# Patient Record
Sex: FEMALE | Race: WHITE | NOT HISPANIC OR LATINO | Employment: OTHER | URBAN - METROPOLITAN AREA
[De-identification: names, ages, dates, MRNs, and addresses within clinical notes are randomized per-mention and may not be internally consistent; named-entity substitution may affect disease eponyms.]

---

## 2017-12-11 ENCOUNTER — LAB (OUTPATIENT)
Dept: LAB | Facility: HOSPITAL | Age: 82
End: 2017-12-11

## 2017-12-11 ENCOUNTER — OFFICE VISIT (OUTPATIENT)
Dept: CARDIOLOGY | Facility: CLINIC | Age: 82
End: 2017-12-11

## 2017-12-11 VITALS — HEIGHT: 62 IN | WEIGHT: 106 LBS | BODY MASS INDEX: 19.51 KG/M2

## 2017-12-11 DIAGNOSIS — I48.0 PAF (PAROXYSMAL ATRIAL FIBRILLATION) (HCC): ICD-10-CM

## 2017-12-11 DIAGNOSIS — I48.0 PAF (PAROXYSMAL ATRIAL FIBRILLATION) (HCC): Primary | ICD-10-CM

## 2017-12-11 LAB
ANION GAP SERPL CALCULATED.3IONS-SCNC: 5 MMOL/L (ref 3–11)
BUN BLD-MCNC: 19 MG/DL (ref 9–23)
BUN/CREAT SERPL: 15.8 (ref 7–25)
CALCIUM SPEC-SCNC: 8.5 MG/DL (ref 8.7–10.4)
CHLORIDE SERPL-SCNC: 104 MMOL/L (ref 99–109)
CO2 SERPL-SCNC: 31 MMOL/L (ref 20–31)
CREAT BLD-MCNC: 1.2 MG/DL (ref 0.6–1.3)
DEPRECATED RDW RBC AUTO: 48.6 FL (ref 37–54)
ERYTHROCYTE [DISTWIDTH] IN BLOOD BY AUTOMATED COUNT: 13.6 % (ref 11.3–14.5)
GFR SERPL CREATININE-BSD FRML MDRD: 42 ML/MIN/1.73
GLUCOSE BLD-MCNC: 109 MG/DL (ref 70–100)
HCT VFR BLD AUTO: 40 % (ref 34.5–44)
HGB BLD-MCNC: 12.7 G/DL (ref 11.5–15.5)
MCH RBC QN AUTO: 30.8 PG (ref 27–31)
MCHC RBC AUTO-ENTMCNC: 31.8 G/DL (ref 32–36)
MCV RBC AUTO: 97.1 FL (ref 80–99)
PLATELET # BLD AUTO: 265 10*3/MM3 (ref 150–450)
PMV BLD AUTO: 10.7 FL (ref 6–12)
POTASSIUM BLD-SCNC: 4.1 MMOL/L (ref 3.5–5.5)
RBC # BLD AUTO: 4.12 10*6/MM3 (ref 3.89–5.14)
SODIUM BLD-SCNC: 140 MMOL/L (ref 132–146)
T4 FREE SERPL-MCNC: 0.91 NG/DL (ref 0.89–1.76)
TSH SERPL DL<=0.05 MIU/L-ACNC: 31.17 MIU/ML (ref 0.35–5.35)
WBC NRBC COR # BLD: 7.96 10*3/MM3 (ref 3.5–10.8)

## 2017-12-11 PROCEDURE — 85027 COMPLETE CBC AUTOMATED: CPT

## 2017-12-11 PROCEDURE — 84439 ASSAY OF FREE THYROXINE: CPT

## 2017-12-11 PROCEDURE — 84443 ASSAY THYROID STIM HORMONE: CPT

## 2017-12-11 PROCEDURE — 80048 BASIC METABOLIC PNL TOTAL CA: CPT

## 2017-12-11 PROCEDURE — 99213 OFFICE O/P EST LOW 20 MIN: CPT | Performed by: INTERNAL MEDICINE

## 2017-12-11 PROCEDURE — 36415 COLL VENOUS BLD VENIPUNCTURE: CPT

## 2017-12-11 PROCEDURE — 93000 ELECTROCARDIOGRAM COMPLETE: CPT | Performed by: INTERNAL MEDICINE

## 2017-12-11 RX ORDER — LEVOTHYROXINE SODIUM 0.07 MG/1
75 TABLET ORAL DAILY
Qty: 30 TABLET | Refills: 0 | Status: SHIPPED | OUTPATIENT
Start: 2017-12-11

## 2017-12-11 RX ORDER — DILTIAZEM HYDROCHLORIDE 120 MG/1
120 CAPSULE, EXTENDED RELEASE ORAL DAILY
Qty: 30 CAPSULE | Refills: 11 | Status: SHIPPED | OUTPATIENT
Start: 2017-12-11

## 2017-12-11 NOTE — PROGRESS NOTES
[facility] at [facility]  Office visit  Eliane Zamora  1929  [phone]    VISIT DATE:  2017    PCP: Jack Chilel MD  [address] [address]    CC:  Chief Complaint   Patient presents with   • Atrial Fibrillation       PROBLEM LIST:  1. Paroxysmal atrial fibrillation:  a. Rhythm control with sotalol.  b. Excessive bradyarrhythmias associated with digoxin.  2. Hypothyroidism.  3. History of abnormal stress echocardiogram with recent echocardiogram showing borderline LV function.   4. Osteoporosis/mild degenerative joint disease.  5. Hypercholesterolemia.  6. Hypothyroidism on chronic supplementation.   7. Gastroesophageal reflux disease.  8. Current urinary tract infection by laboratory value, 2013: Less than 100,000 units/mL of E. coli.  9. Surgical history:  a.  section.  b. Benign breast biopsy.    ASSESSMENT:   Diagnosis Plan   1. PAF (paroxysmal atrial fibrillation)         PLAN:  Paroxysmal atrial fibrillation: Currently with asymptomatic RVR today.  Daughter will make sure that she is taking her sotalol, 40 mg by mouth twice a day.  Adding diltiazem extended release 120 mg by mouth daily.  Will follow-up in one week to assess overall rate control.  Given overall frailty and high risk for anticoagulation I would like to avoid a cardioversion.  We'll attempt rate control initially.  Still feel that she is high risk for chronic anticoagulation.  CBC, BMP and thyroid function test pending today.  Daughter does not think she is taking her thyroid medications either.    Subjective  Family reports generalized frailty and fatigue.  Loss of appetite over the previous 6 months.  She reports stable shortness of breath.  She is very frail individual who essentially needs a wheelchair for ambulation.  She was noted to be in A. fib with RVR today.  Unclear duration.  Her daughter is unclear if she is been consistently taking her medications.  There is  "some issues with dementia in both the  and wife.  Previously considered a high risk candidate for chronic anticoagulation, I concur.  PVC did not tolerate digoxin.    PHYSICAL EXAMINATION:  Vitals:    12/11/17 1031   Weight: 48.1 kg (106 lb)   Height: 157.5 cm (62\")     General Appearance:    Frail    Head:    Normocephalic, without obvious abnormality, atraumatic   Eyes:    conjunctiva/corneas clear   Nose:   Nares normal, septum midline, mucosa normal, no drainage   Throat:   Lips, teeth and gums normal   Neck:   Supple, symmetrical, trachea midline, no carotid    bruit or JVD   Lungs:     Clear to auscultation bilaterally, respirations unlabored   Chest Wall:    No tenderness or deformity    Heart:    Irregularly irregular, tachycardia, S1 and S2 normal, no murmur, rub   or gallop, normal carotid impulse bilaterally without bruit.   Abdomen:     Soft, non-tender   Extremities:   Extremities normal, atraumatic, no cyanosis or edema   Pulses:   2+ and symmetric all extremities   Skin:   Skin color, texture, turgor normal, no rashes or lesions       Diagnostic Data:    ECG 12 Lead  Date/Time: 12/11/2017 10:44 AM  Performed by: LIZETT CHESTER III  Authorized by: LIZETT CHESTER III   Rhythm: atrial fibrillation  Rate: tachycardic  Clinical impression: abnormal ECG          No results found for: CHLPL, TRIG, HDL, LDLDIRECT  Lab Results   Component Value Date    GLUCOSE 104 (H) 03/29/2015    BUN 13 03/29/2015    CREATININE 0.9 03/29/2015     03/29/2015    K 4.3 03/29/2015    CL 99 03/29/2015    CO2 31 03/29/2015     No results found for: HGBA1C  Lab Results   Component Value Date    WBC 7.79 03/29/2015    HGB 13.4 03/29/2015    HCT 40.3 03/29/2015     03/29/2015       Allergies  Allergies   Allergen Reactions   • Codeine      Severe nausea and vomiting       Current Medications    Current Outpatient Prescriptions:   •  levothyroxine (SYNTHROID, LEVOTHROID) 75 MCG tablet, Take 1 tablet by mouth Daily., " Disp: 90 tablet, Rfl: 3  •  Multiple Vitamins-Minerals (CENTRUM SILVER ADULT 50+ PO), Take  by mouth Daily., Disp: , Rfl:   •  pantoprazole (PROTONIX) 40 MG EC tablet, Take 1 tablet by mouth Daily., Disp: 90 tablet, Rfl: 3  •  sotalol (BETAPACE) 80 MG tablet, Take 1/2 tab every 12 hours, Disp: 180 tablet, Rfl: 3          ROS  Review of Systems   Constitution: Positive for weakness and malaise/fatigue.   Cardiovascular: Positive for chest pain.   Respiratory: Positive for shortness of breath.        SOCIAL HX  Social History     Social History   • Marital status:      Spouse name: N/A   • Number of children: N/A   • Years of education: N/A     Occupational History   • Not on file.     Social History Main Topics   • Smoking status: Never Smoker   • Smokeless tobacco: Never Used   • Alcohol use 0.6 oz/week     1 Glasses of wine per week      Comment: occasionally   • Drug use: No   • Sexual activity: Defer     Other Topics Concern   • Not on file     Social History Narrative       FAMILY HX  No family history on file.          Thompson Navarrete III, MD, FACC

## 2017-12-18 ENCOUNTER — OFFICE VISIT (OUTPATIENT)
Dept: CARDIOLOGY | Facility: CLINIC | Age: 82
End: 2017-12-18

## 2017-12-18 VITALS
DIASTOLIC BLOOD PRESSURE: 48 MMHG | HEART RATE: 56 BPM | BODY MASS INDEX: 19.06 KG/M2 | HEIGHT: 62 IN | WEIGHT: 103.6 LBS | SYSTOLIC BLOOD PRESSURE: 98 MMHG

## 2017-12-18 DIAGNOSIS — I48.0 PAF (PAROXYSMAL ATRIAL FIBRILLATION) (HCC): Primary | ICD-10-CM

## 2017-12-18 DIAGNOSIS — E78.00 HYPERCHOLESTEROLEMIA: ICD-10-CM

## 2017-12-18 PROCEDURE — 99213 OFFICE O/P EST LOW 20 MIN: CPT | Performed by: INTERNAL MEDICINE

## 2017-12-18 RX ORDER — AMIODARONE HYDROCHLORIDE 200 MG/1
200 TABLET ORAL DAILY
Qty: 90 TABLET | Refills: 1 | Status: SHIPPED | OUTPATIENT
Start: 2017-12-18 | End: 2018-08-18

## 2017-12-18 NOTE — PROGRESS NOTES
[facility] at [facility]  Office visit  Eliane Zamora  1929  [phone]    VISIT DATE:  2017    PCP: aJck Chilel MD  [address] [address]    CC:  Chief Complaint   Patient presents with   • Atrial Fibrillation       PROBLEM LIST:  1. Paroxysmal atrial fibrillation:  a. Rhythm control with sotalol.  b. Excessive bradyarrhythmias associated with digoxin.  2. Hypothyroidism.  3. History of abnormal stress echocardiogram with recent echocardiogram showing borderline LV function.   4. Osteoporosis/mild degenerative joint disease.  5. Hypercholesterolemia.  6. Hypothyroidism on chronic supplementation.   7. Gastroesophageal reflux disease.  8. Current urinary tract infection by laboratory value, 2013: Less than 100,000 units/mL of E. coli.  9. Surgical history:  a.  section.  b. Benign breast biopsy.    ASSESSMENT:   Diagnosis Plan   1. PAF (paroxysmal atrial fibrillation)     2. Hypercholesterolemia         PLAN:  Paroxysmal atrial fibrillation: Currently asymptomatic, Improved heart rate.  Still having difficulty with medication compliance.  Arranging for home health nursing visits.  They're planning on spending 3 months in [place] starting on .  WIll be challenging for them to be compliant with her medical therapy during this time.  They both appear to have moderate dementia.  Ideally we would arrange for home health nursing visits to assess medication compliance and for indication education in [place] as well.  Discontinuing sotalol, transitioning over to amiodarone 200 mg by mouth daily.  Continue diltiazem extended release 120 mg by mouth daily.  Given overall frailty and high risk for anticoagulation I would avoid a cardioversion.  Continue rate control initially.  Still feel that she is high risk for chronic anticoagulation.  Laboratory evaluation revealed hypothyroidism, again this is a medication noncompliance issue due to  "underlying dementia.      Subjective  Daughter reports that there still having some difficulty consists of a taking her medications, she was able to get, pulse ox and a clock that had the day of the week on it.  Her parents are resistant to having help in the home.  They stopped strong desire to go to [place] this winter which they've been doing for the past 30 years.  lEiane currently denies chest pain, palpitations or lightheadedness.  Will switch her over to amiodarone once daily that way if she misses a dose or 2 her heart rate will still be reasonably well controlled given its long half-life.  Otherwise continue diltiazem.    PHYSICAL EXAMINATION:  Vitals:    12/18/17 0851   BP: 98/48   BP Location: Right arm   Patient Position: Sitting   Pulse: 56   Weight: 47 kg (103 lb 9.6 oz)   Height: 157.5 cm (62\")     General Appearance:    Frail    Head:    Normocephalic, without obvious abnormality, atraumatic   Eyes:    conjunctiva/corneas clear   Nose:   Nares normal, septum midline, mucosa normal, no drainage   Throat:   Lips, teeth and gums normal   Neck:   Supple, symmetrical, trachea midline, no carotid    bruit or JVD   Lungs:     Clear to auscultation bilaterally, respirations unlabored   Chest Wall:    No tenderness or deformity    Heart:    Irregularly irregular,S1 and S2 normal, no murmur, rub   or gallop, normal carotid impulse bilaterally without bruit.   Abdomen:     Soft, non-tender   Extremities:   Extremities normal, atraumatic, no cyanosis or edema   Pulses:   2+ and symmetric all extremities   Skin:   Skin color, texture, turgor normal, no rashes or lesions       Diagnostic Data:  Procedures  No results found for: CHLPL, TRIG, HDL, LDLDIRECT  Lab Results   Component Value Date    GLUCOSE 109 (H) 12/11/2017    BUN 19 12/11/2017    CREATININE 1.20 12/11/2017     12/11/2017    K 4.1 12/11/2017     12/11/2017    CO2 31.0 12/11/2017     No results found for: HGBA1C  Lab Results   Component " Value Date    WBC 7.96 12/11/2017    HGB 12.7 12/11/2017    HCT 40.0 12/11/2017     12/11/2017       Allergies  Allergies   Allergen Reactions   • Codeine      Severe nausea and vomiting       Current Medications    Current Outpatient Prescriptions:   •  diltiazem XR (DILACOR XR) 120 MG 24 hr capsule, Take 1 capsule by mouth Daily., Disp: 30 capsule, Rfl: 11  •  levothyroxine (SYNTHROID, LEVOTHROID) 75 MCG tablet, Take 1 tablet by mouth Daily., Disp: 30 tablet, Rfl: 0  •  Multiple Vitamins-Minerals (CENTRUM SILVER ADULT 50+ PO), Take  by mouth Daily., Disp: , Rfl:   •  pantoprazole (PROTONIX) 40 MG EC tablet, Take 1 tablet by mouth Daily., Disp: 90 tablet, Rfl: 3  •  sotalol (BETAPACE) 80 MG tablet, Take 1/2 tab every 12 hours, Disp: 180 tablet, Rfl: 3          ROS  Review of Systems   Constitution: Positive for weakness and malaise/fatigue.   Cardiovascular: Negative for chest pain, irregular heartbeat and palpitations.   Respiratory: Positive for shortness of breath.        SOCIAL HX  Social History     Social History   • Marital status:      Spouse name: N/A   • Number of children: N/A   • Years of education: N/A     Occupational History   • Not on file.     Social History Main Topics   • Smoking status: Never Smoker   • Smokeless tobacco: Never Used   • Alcohol use 0.6 oz/week     1 Glasses of wine per week      Comment: occasionally   • Drug use: No   • Sexual activity: Defer     Other Topics Concern   • Not on file     Social History Narrative       FAMILY HX  History reviewed. No pertinent family history.          Thompson Navarrete III, MD, FACC

## 2018-03-30 ENCOUNTER — APPOINTMENT (OUTPATIENT)
Dept: GENERAL RADIOLOGY | Facility: HOSPITAL | Age: 83
End: 2018-03-30

## 2018-03-30 ENCOUNTER — HOSPITAL ENCOUNTER (EMERGENCY)
Facility: HOSPITAL | Age: 83
Discharge: HOME | End: 2018-03-30
Attending: EMERGENCY MEDICINE | Admitting: EMERGENCY MEDICINE

## 2018-03-30 VITALS
RESPIRATION RATE: 16 BRPM | HEIGHT: 62 IN | SYSTOLIC BLOOD PRESSURE: 150 MMHG | DIASTOLIC BLOOD PRESSURE: 66 MMHG | OXYGEN SATURATION: 96 % | BODY MASS INDEX: 19.51 KG/M2 | TEMPERATURE: 98.4 F | WEIGHT: 106 LBS | HEART RATE: 73 BPM
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
## 2018-03-30 DIAGNOSIS — S42.032A CLOSED DISPLACED FRACTURE OF ACROMIAL END OF LEFT CLAVICLE, INITIAL ENCOUNTER: ICD-10-CM

## 2018-03-30 DIAGNOSIS — J18.9 PNEUMONIA OF LEFT LOWER LOBE DUE TO INFECTIOUS ORGANISM: ICD-10-CM

## 2018-03-30 LAB
ALBUMIN SERPL-MCNC: 3.6 G/DL (ref 3.2–4.8)
ALBUMIN/GLOB SERPL: 1.6 G/DL (ref 1.5–2.5)
ALP SERPL-CCNC: 109 U/L (ref 25–100)
ALT SERPL W P-5'-P-CCNC: 24 U/L (ref 7–40)
ANION GAP SERPL CALCULATED.3IONS-SCNC: 7 MMOL/L (ref 3–11)
AST SERPL-CCNC: 28 U/L (ref 0–33)
BASOPHILS # BLD AUTO: 0.02 10*3/MM3 (ref 0–0.2)
BASOPHILS NFR BLD AUTO: 0.2 % (ref 0–1)
BILIRUB SERPL-MCNC: 0.8 MG/DL (ref 0.3–1.2)
BILIRUB UR QL STRIP: NEGATIVE
BUN BLD-MCNC: 16 MG/DL (ref 9–23)
BUN/CREAT SERPL: 14.5 (ref 7–25)
CALCIUM SPEC-SCNC: 8.1 MG/DL (ref 8.7–10.4)
CHLORIDE SERPL-SCNC: 101 MMOL/L (ref 99–109)
CLARITY UR: CLEAR
CO2 SERPL-SCNC: 29 MMOL/L (ref 20–31)
COLOR UR: YELLOW
CREAT BLD-MCNC: 1.1 MG/DL (ref 0.6–1.3)
DEPRECATED RDW RBC AUTO: 48.9 FL (ref 37–54)
EOSINOPHIL # BLD AUTO: 0.05 10*3/MM3 (ref 0–0.3)
EOSINOPHIL NFR BLD AUTO: 0.6 % (ref 0–3)
ERYTHROCYTE [DISTWIDTH] IN BLOOD BY AUTOMATED COUNT: 14.3 % (ref 11.3–14.5)
GFR SERPL CREATININE-BSD FRML MDRD: 47 ML/MIN/1.73
GLOBULIN UR ELPH-MCNC: 2.3 GM/DL
GLUCOSE BLD-MCNC: 99 MG/DL (ref 70–100)
GLUCOSE UR STRIP-MCNC: NEGATIVE MG/DL
HCT VFR BLD AUTO: 39.4 % (ref 34.5–44)
HGB BLD-MCNC: 12.6 G/DL (ref 11.5–15.5)
HGB UR QL STRIP.AUTO: NEGATIVE
IMM GRANULOCYTES # BLD: 0.14 10*3/MM3 (ref 0–0.03)
IMM GRANULOCYTES NFR BLD: 1.7 % (ref 0–0.6)
KETONES UR QL STRIP: NEGATIVE
LEUKOCYTE ESTERASE UR QL STRIP.AUTO: NEGATIVE
LYMPHOCYTES # BLD AUTO: 0.72 10*3/MM3 (ref 0.6–4.8)
LYMPHOCYTES NFR BLD AUTO: 8.5 % (ref 24–44)
MCH RBC QN AUTO: 29.9 PG (ref 27–31)
MCHC RBC AUTO-ENTMCNC: 32 G/DL (ref 32–36)
MCV RBC AUTO: 93.4 FL (ref 80–99)
MONOCYTES # BLD AUTO: 0.73 10*3/MM3 (ref 0–1)
MONOCYTES NFR BLD AUTO: 8.7 % (ref 0–12)
NEUTROPHILS # BLD AUTO: 6.77 10*3/MM3 (ref 1.5–8.3)
NEUTROPHILS NFR BLD AUTO: 80.3 % (ref 41–71)
NITRITE UR QL STRIP: NEGATIVE
PH UR STRIP.AUTO: 6 [PH] (ref 5–8)
PLATELET # BLD AUTO: 242 10*3/MM3 (ref 150–450)
PMV BLD AUTO: 10.1 FL (ref 6–12)
POTASSIUM BLD-SCNC: 4 MMOL/L (ref 3.5–5.5)
PROT SERPL-MCNC: 5.9 G/DL (ref 5.7–8.2)
PROT UR QL STRIP: NEGATIVE
RBC # BLD AUTO: 4.22 10*6/MM3 (ref 3.89–5.14)
SODIUM BLD-SCNC: 137 MMOL/L (ref 132–146)
SP GR UR STRIP: 1.02 (ref 1–1.03)
TROPONIN I SERPL-MCNC: 0 NG/ML (ref 0–0.07)
UROBILINOGEN UR QL STRIP: NORMAL
WBC NRBC COR # BLD: 8.43 10*3/MM3 (ref 3.5–10.8)

## 2018-03-30 PROCEDURE — 73030 X-RAY EXAM OF SHOULDER: CPT

## 2018-03-30 PROCEDURE — 85025 COMPLETE CBC W/AUTO DIFF WBC: CPT | Performed by: EMERGENCY MEDICINE

## 2018-03-30 PROCEDURE — 25010000002 CEFTRIAXONE PER 250 MG: Performed by: EMERGENCY MEDICINE

## 2018-03-30 PROCEDURE — 80053 COMPREHEN METABOLIC PANEL: CPT | Performed by: EMERGENCY MEDICINE

## 2018-03-30 PROCEDURE — 96361 HYDRATE IV INFUSION ADD-ON: CPT

## 2018-03-30 PROCEDURE — 84484 ASSAY OF TROPONIN QUANT: CPT

## 2018-03-30 PROCEDURE — 99284 EMERGENCY DEPT VISIT MOD MDM: CPT

## 2018-03-30 PROCEDURE — 71045 X-RAY EXAM CHEST 1 VIEW: CPT

## 2018-03-30 PROCEDURE — 81003 URINALYSIS AUTO W/O SCOPE: CPT | Performed by: EMERGENCY MEDICINE

## 2018-03-30 PROCEDURE — 93005 ELECTROCARDIOGRAM TRACING: CPT | Performed by: EMERGENCY MEDICINE

## 2018-03-30 PROCEDURE — 96365 THER/PROPH/DIAG IV INF INIT: CPT

## 2018-03-30 RX ORDER — ACETAMINOPHEN 325 MG/1
650 TABLET ORAL ONCE
Status: COMPLETED | OUTPATIENT
Start: 2018-03-30 | End: 2018-03-30

## 2018-03-30 RX ORDER — CEFUROXIME AXETIL 500 MG/1
500 TABLET ORAL 2 TIMES DAILY
Qty: 20 TABLET | Refills: 0 | Status: SHIPPED | OUTPATIENT
Start: 2018-03-30 | End: 2018-06-07

## 2018-03-30 RX ORDER — CEFTRIAXONE SODIUM 1 G/50ML
1 INJECTION, SOLUTION INTRAVENOUS ONCE
Status: COMPLETED | OUTPATIENT
Start: 2018-03-30 | End: 2018-03-30

## 2018-03-30 RX ADMIN — SODIUM CHLORIDE 1000 ML: 9 INJECTION, SOLUTION INTRAVENOUS at 11:02

## 2018-03-30 RX ADMIN — ACETAMINOPHEN 650 MG: 325 TABLET ORAL at 11:12

## 2018-03-30 RX ADMIN — CEFTRIAXONE SODIUM 1 G: 1 INJECTION, SOLUTION INTRAVENOUS at 12:45

## 2018-04-25 ENCOUNTER — OFFICE VISIT (OUTPATIENT)
Dept: CARDIOLOGY | Facility: CLINIC | Age: 83
End: 2018-04-25

## 2018-04-25 VITALS
DIASTOLIC BLOOD PRESSURE: 50 MMHG | SYSTOLIC BLOOD PRESSURE: 122 MMHG | WEIGHT: 97.4 LBS | BODY MASS INDEX: 17.26 KG/M2 | HEIGHT: 63 IN | HEART RATE: 73 BPM

## 2018-04-25 DIAGNOSIS — I48.0 PAF (PAROXYSMAL ATRIAL FIBRILLATION) (HCC): Primary | ICD-10-CM

## 2018-04-25 PROCEDURE — 99213 OFFICE O/P EST LOW 20 MIN: CPT | Performed by: INTERNAL MEDICINE

## 2018-04-25 NOTE — PROGRESS NOTES
[facility] at [facility]  Office visit  Eliane Zamora  1929  [phone]    VISIT DATE:  2017    PCP: Jack Chilel MD  [address] [address]    CC:  No chief complaint on file.      PROBLEM LIST:  1. Paroxysmal atrial fibrillation:  a. Rhythm control with sotalol.  b. Excessive bradyarrhythmias associated with digoxin.  2. Hypothyroidism.  3. History of abnormal stress echocardiogram with recent echocardiogram showing borderline LV function.   4. Osteoporosis/mild degenerative joint disease.  5. Hypercholesterolemia.  6. Hypothyroidism on chronic supplementation.   7. Gastroesophageal reflux disease.  8. Current urinary tract infection by laboratory value, 2013: Less than 100,000 units/mL of E. coli.  9. Surgical history:  a.  section.  b. Benign breast biopsy.    ASSESSMENT:   Diagnosis Plan   1. PAF (paroxysmal atrial fibrillation)         PLAN:  Paroxysmal atrial fibrillation: Currently asymptomatic.  Having difficulty with medication compliance.   Still feel that she is high risk for chronic anticoagulation.    Amiodarone 200 mg by mouth daily.  Previously failed sotalol.  Repeat chest x-ray and EKG in 6 months.  Primary care physician following thyroid function tests.    Subjective  Recently returned from a three-month trip the [place].  Daughter is arranging for temporary home health visits.  She denies chest pain, palpitations or dyspnea.  Recent mechanical fall.  Chest x-ray did notice some mild new interstitial changes in her left long potentially concerning for developing pneumonia.  Lung fields are clear today.  She denies cough.    PHYSICAL EXAMINATION:  There were no vitals filed for this visit.  General Appearance:    Frail    Head:    Normocephalic, without obvious abnormality, atraumatic   Eyes:    conjunctiva/corneas clear   Nose:   Nares normal, septum midline, mucosa normal, no drainage   Throat:   Lips, teeth and gums normal    Neck:   Supple, symmetrical, trachea midline, no carotid    bruit or JVD   Lungs:     Clear to auscultation bilaterally, respirations unlabored   Chest Wall:    No tenderness or deformity    Heart:   Regular rate and rhythm,S1 and S2 normal, no murmur, rub   or gallop, normal carotid impulse bilaterally without bruit.   Abdomen:     Soft, non-tender   Extremities:   Extremities normal, atraumatic, no cyanosis or edema   Pulses:   2+ and symmetric all extremities   Skin:   Skin color, texture, turgor normal, no rashes or lesions       Diagnostic Data:  Procedures  No results found for: CHLPL, TRIG, HDL, LDLDIRECT  Lab Results   Component Value Date    GLUCOSE 99 03/30/2018    BUN 16 03/30/2018    CREATININE 1.10 03/30/2018     03/30/2018    K 4.0 03/30/2018     03/30/2018    CO2 29.0 03/30/2018     No results found for: HGBA1C  Lab Results   Component Value Date    WBC 8.43 03/30/2018    HGB 12.6 03/30/2018    HCT 39.4 03/30/2018     03/30/2018       Allergies  Allergies   Allergen Reactions   • Codeine      Severe nausea and vomiting       Current Medications    Current Outpatient Prescriptions:   •  amiodarone (PACERONE) 200 MG tablet, Take 1 tablet by mouth Daily., Disp: 90 tablet, Rfl: 1  •  cefuroxime (CEFTIN) 500 MG tablet, Take 1 tablet by mouth 2 (Two) Times a Day., Disp: 20 tablet, Rfl: 0  •  diltiazem XR (DILACOR XR) 120 MG 24 hr capsule, Take 1 capsule by mouth Daily., Disp: 30 capsule, Rfl: 11  •  levothyroxine (SYNTHROID, LEVOTHROID) 75 MCG tablet, Take 1 tablet by mouth Daily., Disp: 30 tablet, Rfl: 0  •  Multiple Vitamins-Minerals (CENTRUM SILVER ADULT 50+ PO), Take  by mouth Daily., Disp: , Rfl:   •  pantoprazole (PROTONIX) 40 MG EC tablet, Take 1 tablet by mouth Daily., Disp: 90 tablet, Rfl: 3          ROS  Review of Systems   Constitution: Positive for weakness and malaise/fatigue.   Cardiovascular: Negative for chest pain, irregular heartbeat and palpitations.   Respiratory:  Positive for shortness of breath.        SOCIAL HX  Social History     Social History   • Marital status:      Spouse name: N/A   • Number of children: N/A   • Years of education: N/A     Occupational History   • Not on file.     Social History Main Topics   • Smoking status: Never Smoker   • Smokeless tobacco: Never Used   • Alcohol use 0.6 oz/week     1 Glasses of wine per week      Comment: occasionally   • Drug use: No   • Sexual activity: Defer     Other Topics Concern   • Not on file     Social History Narrative   • No narrative on file       FAMILY HX  No family history on file.          Thompson Navarrete III, MD, FACC

## 2018-06-07 ENCOUNTER — APPOINTMENT (OUTPATIENT)
Dept: CARDIOLOGY | Facility: HOSPITAL | Age: 83
End: 2018-06-07
Attending: FAMILY MEDICINE

## 2018-06-07 ENCOUNTER — APPOINTMENT (OUTPATIENT)
Dept: CT IMAGING | Facility: HOSPITAL | Age: 83
End: 2018-06-07

## 2018-06-07 ENCOUNTER — APPOINTMENT (OUTPATIENT)
Dept: GENERAL RADIOLOGY | Facility: HOSPITAL | Age: 83
End: 2018-06-07

## 2018-06-07 ENCOUNTER — HOSPITAL ENCOUNTER (INPATIENT)
Facility: HOSPITAL | Age: 83
LOS: 6 days | Discharge: NURSING, REHAB OR LONG-TERM CARE | End: 2018-06-13
Attending: EMERGENCY MEDICINE | Admitting: INTERNAL MEDICINE

## 2018-06-07 DIAGNOSIS — R31.9 URINARY TRACT INFECTION WITH HEMATURIA, SITE UNSPECIFIED: ICD-10-CM

## 2018-06-07 DIAGNOSIS — Z74.09 IMPAIRED MOBILITY AND ADLS: ICD-10-CM

## 2018-06-07 DIAGNOSIS — J18.9 PNEUMONIA OF RIGHT LUNG DUE TO INFECTIOUS ORGANISM, UNSPECIFIED PART OF LUNG: ICD-10-CM

## 2018-06-07 DIAGNOSIS — A41.9 SEPSIS, DUE TO UNSPECIFIED ORGANISM: Primary | ICD-10-CM

## 2018-06-07 DIAGNOSIS — N39.0 URINARY TRACT INFECTION WITH HEMATURIA, SITE UNSPECIFIED: ICD-10-CM

## 2018-06-07 DIAGNOSIS — Z78.9 IMPAIRED MOBILITY AND ADLS: ICD-10-CM

## 2018-06-07 DIAGNOSIS — Z74.09 IMPAIRED FUNCTIONAL MOBILITY, BALANCE, GAIT, AND ENDURANCE: ICD-10-CM

## 2018-06-07 DIAGNOSIS — E86.0 DEHYDRATION, MODERATE: ICD-10-CM

## 2018-06-07 LAB
ALBUMIN SERPL-MCNC: 3.65 G/DL (ref 3.2–4.8)
ALBUMIN/GLOB SERPL: 1.4 G/DL (ref 1.5–2.5)
ALP SERPL-CCNC: 126 U/L (ref 25–100)
ALT SERPL W P-5'-P-CCNC: 23 U/L (ref 7–40)
ANION GAP SERPL CALCULATED.3IONS-SCNC: 7 MMOL/L (ref 3–11)
AST SERPL-CCNC: 29 U/L (ref 0–33)
BACTERIA UR QL AUTO: ABNORMAL /HPF
BASOPHILS # BLD AUTO: 0.01 10*3/MM3 (ref 0–0.2)
BASOPHILS NFR BLD AUTO: 0.1 % (ref 0–1)
BILIRUB SERPL-MCNC: 0.8 MG/DL (ref 0.3–1.2)
BILIRUB UR QL STRIP: ABNORMAL
BUN BLD-MCNC: 22 MG/DL (ref 9–23)
BUN/CREAT SERPL: 17.9 (ref 7–25)
CALCIUM SPEC-SCNC: 8.1 MG/DL (ref 8.7–10.4)
CHLORIDE SERPL-SCNC: 102 MMOL/L (ref 99–109)
CLARITY UR: ABNORMAL
CO2 SERPL-SCNC: 28 MMOL/L (ref 20–31)
COLOR UR: YELLOW
CREAT BLD-MCNC: 1.23 MG/DL (ref 0.6–1.3)
D-LACTATE SERPL-SCNC: 0.9 MMOL/L (ref 0.5–2)
DEPRECATED RDW RBC AUTO: 47.9 FL (ref 37–54)
EOSINOPHIL # BLD AUTO: 0.01 10*3/MM3 (ref 0–0.3)
EOSINOPHIL NFR BLD AUTO: 0.1 % (ref 0–3)
ERYTHROCYTE [DISTWIDTH] IN BLOOD BY AUTOMATED COUNT: 14.1 % (ref 11.3–14.5)
GFR SERPL CREATININE-BSD FRML MDRD: 41 ML/MIN/1.73
GLOBULIN UR ELPH-MCNC: 2.6 GM/DL
GLUCOSE BLD-MCNC: 106 MG/DL (ref 70–100)
GLUCOSE UR STRIP-MCNC: NEGATIVE MG/DL
HCT VFR BLD AUTO: 38.1 % (ref 34.5–44)
HGB BLD-MCNC: 12.2 G/DL (ref 11.5–15.5)
HGB UR QL STRIP.AUTO: ABNORMAL
HOLD SPECIMEN: NORMAL
HOLD SPECIMEN: NORMAL
HYALINE CASTS UR QL AUTO: ABNORMAL /LPF
IMM GRANULOCYTES # BLD: 0.03 10*3/MM3 (ref 0–0.03)
IMM GRANULOCYTES NFR BLD: 0.2 % (ref 0–0.6)
KETONES UR QL STRIP: NEGATIVE
LEUKOCYTE ESTERASE UR QL STRIP.AUTO: ABNORMAL
LYMPHOCYTES # BLD AUTO: 0.23 10*3/MM3 (ref 0.6–4.8)
LYMPHOCYTES NFR BLD AUTO: 1.9 % (ref 24–44)
MCH RBC QN AUTO: 29.6 PG (ref 27–31)
MCHC RBC AUTO-ENTMCNC: 32 G/DL (ref 32–36)
MCV RBC AUTO: 92.5 FL (ref 80–99)
MONOCYTES # BLD AUTO: 0.6 10*3/MM3 (ref 0–1)
MONOCYTES NFR BLD AUTO: 4.9 % (ref 0–12)
NEUTROPHILS # BLD AUTO: 11.49 10*3/MM3 (ref 1.5–8.3)
NEUTROPHILS NFR BLD AUTO: 93 % (ref 41–71)
NITRITE UR QL STRIP: NEGATIVE
PH UR STRIP.AUTO: 6 [PH] (ref 5–8)
PLATELET # BLD AUTO: 269 10*3/MM3 (ref 150–450)
PMV BLD AUTO: 9.4 FL (ref 6–12)
POTASSIUM BLD-SCNC: 3.7 MMOL/L (ref 3.5–5.5)
PROCALCITONIN SERPL-MCNC: 0.74 NG/ML
PROT SERPL-MCNC: 6.2 G/DL (ref 5.7–8.2)
PROT UR QL STRIP: ABNORMAL
RBC # BLD AUTO: 4.12 10*6/MM3 (ref 3.89–5.14)
RBC # UR: ABNORMAL /HPF
REF LAB TEST METHOD: ABNORMAL
SODIUM BLD-SCNC: 137 MMOL/L (ref 132–146)
SP GR UR STRIP: 1.03 (ref 1–1.03)
SQUAMOUS #/AREA URNS HPF: ABNORMAL /HPF
TRANS CELLS #/AREA URNS HPF: ABNORMAL /HPF
TROPONIN I SERPL-MCNC: 0.01 NG/ML (ref 0–0.07)
TROPONIN I SERPL-MCNC: 0.02 NG/ML
UROBILINOGEN UR QL STRIP: ABNORMAL
WBC NRBC COR # BLD: 12.34 10*3/MM3 (ref 3.5–10.8)
WBC UR QL AUTO: ABNORMAL /HPF
WHOLE BLOOD HOLD SPECIMEN: NORMAL
WHOLE BLOOD HOLD SPECIMEN: NORMAL

## 2018-06-07 PROCEDURE — 92610 EVALUATE SWALLOWING FUNCTION: CPT

## 2018-06-07 PROCEDURE — 87077 CULTURE AEROBIC IDENTIFY: CPT | Performed by: EMERGENCY MEDICINE

## 2018-06-07 PROCEDURE — 25010000002 ENOXAPARIN PER 10 MG: Performed by: FAMILY MEDICINE

## 2018-06-07 PROCEDURE — 25010000002 VANCOMYCIN PER 500 MG: Performed by: EMERGENCY MEDICINE

## 2018-06-07 PROCEDURE — 71250 CT THORAX DX C-: CPT

## 2018-06-07 PROCEDURE — 83605 ASSAY OF LACTIC ACID: CPT | Performed by: EMERGENCY MEDICINE

## 2018-06-07 PROCEDURE — 99285 EMERGENCY DEPT VISIT HI MDM: CPT

## 2018-06-07 PROCEDURE — 81001 URINALYSIS AUTO W/SCOPE: CPT | Performed by: EMERGENCY MEDICINE

## 2018-06-07 PROCEDURE — 93306 TTE W/DOPPLER COMPLETE: CPT | Performed by: INTERNAL MEDICINE

## 2018-06-07 PROCEDURE — 84484 ASSAY OF TROPONIN QUANT: CPT | Performed by: EMERGENCY MEDICINE

## 2018-06-07 PROCEDURE — 87040 BLOOD CULTURE FOR BACTERIA: CPT | Performed by: EMERGENCY MEDICINE

## 2018-06-07 PROCEDURE — 87086 URINE CULTURE/COLONY COUNT: CPT | Performed by: EMERGENCY MEDICINE

## 2018-06-07 PROCEDURE — 71045 X-RAY EXAM CHEST 1 VIEW: CPT

## 2018-06-07 PROCEDURE — 99223 1ST HOSP IP/OBS HIGH 75: CPT | Performed by: FAMILY MEDICINE

## 2018-06-07 PROCEDURE — 84484 ASSAY OF TROPONIN QUANT: CPT

## 2018-06-07 PROCEDURE — 25810000003 SODIUM CHLORIDE 0.9 % WITH KCL 20 MEQ 20-0.9 MEQ/L-% SOLUTION: Performed by: FAMILY MEDICINE

## 2018-06-07 PROCEDURE — 85025 COMPLETE CBC W/AUTO DIFF WBC: CPT | Performed by: EMERGENCY MEDICINE

## 2018-06-07 PROCEDURE — 25010000002 PIPERACILLIN SOD-TAZOBACTAM PER 1 G

## 2018-06-07 PROCEDURE — 25010000002 PIPERACILLIN SOD-TAZOBACTAM PER 1 G: Performed by: EMERGENCY MEDICINE

## 2018-06-07 PROCEDURE — 93005 ELECTROCARDIOGRAM TRACING: CPT | Performed by: EMERGENCY MEDICINE

## 2018-06-07 PROCEDURE — 80053 COMPREHEN METABOLIC PANEL: CPT | Performed by: EMERGENCY MEDICINE

## 2018-06-07 PROCEDURE — P9612 CATHETERIZE FOR URINE SPEC: HCPCS

## 2018-06-07 PROCEDURE — 87186 SC STD MICRODIL/AGAR DIL: CPT | Performed by: EMERGENCY MEDICINE

## 2018-06-07 PROCEDURE — 87899 AGENT NOS ASSAY W/OPTIC: CPT | Performed by: FAMILY MEDICINE

## 2018-06-07 PROCEDURE — 93306 TTE W/DOPPLER COMPLETE: CPT

## 2018-06-07 PROCEDURE — 84145 PROCALCITONIN (PCT): CPT | Performed by: EMERGENCY MEDICINE

## 2018-06-07 PROCEDURE — 70450 CT HEAD/BRAIN W/O DYE: CPT

## 2018-06-07 RX ORDER — ONDANSETRON 2 MG/ML
4 INJECTION INTRAMUSCULAR; INTRAVENOUS EVERY 6 HOURS PRN
Status: DISCONTINUED | OUTPATIENT
Start: 2018-06-07 | End: 2018-06-13 | Stop reason: HOSPADM

## 2018-06-07 RX ORDER — SACCHAROMYCES BOULARDII 250 MG
250 CAPSULE ORAL 2 TIMES DAILY
Status: DISCONTINUED | OUTPATIENT
Start: 2018-06-07 | End: 2018-06-13 | Stop reason: HOSPADM

## 2018-06-07 RX ORDER — ACETAMINOPHEN 500 MG
1000 TABLET ORAL ONCE
Status: COMPLETED | OUTPATIENT
Start: 2018-06-07 | End: 2018-06-07

## 2018-06-07 RX ORDER — PANTOPRAZOLE SODIUM 40 MG/1
40 TABLET, DELAYED RELEASE ORAL
Status: DISCONTINUED | OUTPATIENT
Start: 2018-06-08 | End: 2018-06-13 | Stop reason: HOSPADM

## 2018-06-07 RX ORDER — VANCOMYCIN HYDROCHLORIDE 1 G/200ML
20 INJECTION, SOLUTION INTRAVENOUS ONCE
Status: COMPLETED | OUTPATIENT
Start: 2018-06-07 | End: 2018-06-07

## 2018-06-07 RX ORDER — SODIUM CHLORIDE AND POTASSIUM CHLORIDE 150; 900 MG/100ML; MG/100ML
100 INJECTION, SOLUTION INTRAVENOUS CONTINUOUS
Status: DISCONTINUED | OUTPATIENT
Start: 2018-06-07 | End: 2018-06-08

## 2018-06-07 RX ORDER — ACETAMINOPHEN 325 MG/1
650 TABLET ORAL EVERY 4 HOURS PRN
Status: DISCONTINUED | OUTPATIENT
Start: 2018-06-07 | End: 2018-06-13 | Stop reason: HOSPADM

## 2018-06-07 RX ORDER — CYPROHEPTADINE HYDROCHLORIDE 4 MG/1
4 TABLET ORAL 3 TIMES DAILY PRN
COMMUNITY
End: 2018-08-18

## 2018-06-07 RX ORDER — SODIUM CHLORIDE 0.9 % (FLUSH) 0.9 %
10 SYRINGE (ML) INJECTION AS NEEDED
Status: DISCONTINUED | OUTPATIENT
Start: 2018-06-07 | End: 2018-06-13 | Stop reason: HOSPADM

## 2018-06-07 RX ORDER — DOCUSATE SODIUM 100 MG/1
100 CAPSULE, LIQUID FILLED ORAL 2 TIMES DAILY PRN
Status: DISCONTINUED | OUTPATIENT
Start: 2018-06-07 | End: 2018-06-13 | Stop reason: HOSPADM

## 2018-06-07 RX ORDER — BISACODYL 5 MG/1
5 TABLET, DELAYED RELEASE ORAL DAILY PRN
Status: DISCONTINUED | OUTPATIENT
Start: 2018-06-07 | End: 2018-06-13 | Stop reason: HOSPADM

## 2018-06-07 RX ORDER — SODIUM CHLORIDE 0.9 % (FLUSH) 0.9 %
1-10 SYRINGE (ML) INJECTION AS NEEDED
Status: DISCONTINUED | OUTPATIENT
Start: 2018-06-07 | End: 2018-06-13 | Stop reason: HOSPADM

## 2018-06-07 RX ORDER — LEVOTHYROXINE SODIUM 0.07 MG/1
75 TABLET ORAL DAILY
Status: DISCONTINUED | OUTPATIENT
Start: 2018-06-08 | End: 2018-06-13 | Stop reason: HOSPADM

## 2018-06-07 RX ADMIN — SODIUM CHLORIDE 2000 ML: 9 INJECTION, SOLUTION INTRAVENOUS at 07:59

## 2018-06-07 RX ADMIN — ENOXAPARIN SODIUM 30 MG: 30 INJECTION SUBCUTANEOUS at 16:33

## 2018-06-07 RX ADMIN — VANCOMYCIN HYDROCHLORIDE 1000 MG: 1 INJECTION, SOLUTION INTRAVENOUS at 08:35

## 2018-06-07 RX ADMIN — Medication 250 MG: at 20:06

## 2018-06-07 RX ADMIN — TAZOBACTAM SODIUM AND PIPERACILLIN SODIUM 4.5 G: 500; 4 INJECTION, SOLUTION INTRAVENOUS at 08:36

## 2018-06-07 RX ADMIN — TAZOBACTAM SODIUM AND PIPERACILLIN SODIUM 3.38 G: 375; 3 INJECTION, SOLUTION INTRAVENOUS at 20:09

## 2018-06-07 RX ADMIN — POTASSIUM CHLORIDE AND SODIUM CHLORIDE 100 ML/HR: 900; 150 INJECTION, SOLUTION INTRAVENOUS at 16:33

## 2018-06-07 RX ADMIN — ACETAMINOPHEN 1000 MG: 500 TABLET, FILM COATED ORAL at 09:01

## 2018-06-07 NOTE — PLAN OF CARE
Problem: Patient Care Overview  Goal: Plan of Care Review  Outcome: Ongoing (interventions implemented as appropriate)   06/07/18 1531   Coping/Psychosocial   Plan of Care Reviewed With patient;spouse     SLP evaluation completed. Will follow-up to further assess swallow fxn & r/o silent aspiration w/ FEES. May start regular diet & thin liquids. Consider making pt NPO if s/sxs aspiration noted prior to FEES. Please see note for further details and recommendations.

## 2018-06-07 NOTE — PROGRESS NOTES
"Pharmacy Consult-Vancomycin Dosing  Eliane Zamora is a  88 y.o. female receiving vancomycin therapy.     Indication: Sepsis  Consulting Provider: Andrew DIAS  ID Consult: no    Goal Trough:15-20mcg/ml    Current Antimicrobial Therapy            Vancomycin 1000mg IV Once            Pipercillin-Tazobactam 3.375g q12    Allergies  Allergies as of 06/07/2018 - Reviewed 06/07/2018   Allergen Reaction Noted   • Codeine  11/16/2016     Labs    Results from last 7 days     Lab Units 06/07/18  0751   SODIUM mmol/L 137   POTASSIUM mmol/L 3.7   CHLORIDE mmol/L 102   CO2 mmol/L 28.0   BUN mg/dL 22   CREATININE mg/dL 1.23   GLUCOSE mg/dL 106*   CALCIUM mg/dL 8.1*      Results from last 7 days     Lab Units 06/07/18  0751   WBC 10*3/mm3 12.34*   HEMOGLOBIN g/dL 12.2   HEMATOCRIT % 38.1   PLATELETS 10*3/mm3 269      Evaluation of Dosing     Last Dose Received in the ED/Outside Facility: Vancomycin 1000mg IV in ED    Ht - 152.4 cm (60\")  Wt - 45.4 kg (100 lb)    Estimated Creatinine Clearance: 22.7 mL/min (by C-G formula based on SCr of 1.23 mg/dL).    Intake & Output (last 3 days)         06/04 0701 - 06/05 0700 06/05 0701 - 06/06 0700 06/06 0701 - 06/07 0700 06/07 0701 - 06/08 0700    IV Piggyback    2300    Total Intake(mL/kg)    2300 (50.7)    Net       +2300                  Microbiology and Radiology  Microbiology Results (last 10 days)       ** No results found for the last 240 hours. **          Evaluation of Level    Vancomycin random level scheduled 6/8     Assessment/Plan:     Vancomycin 1000mg load given in ED.  Vancomycin random level 6/8 with morning labs.  Subsequent dosing will be based off levels.   Monitor for s/s nephrotxicity and infusion related reactions.    Bay Meyer, Pharmacy Intern  6/7/2018  3:00 PM      "

## 2018-06-07 NOTE — PLAN OF CARE
Problem: Fall Risk (Adult)  Goal: Identify Related Risk Factors and Signs and Symptoms  Outcome: Ongoing (interventions implemented as appropriate)    Goal: Absence of Fall  Outcome: Ongoing (interventions implemented as appropriate)      Problem: Skin Injury Risk (Adult)  Goal: Identify Related Risk Factors and Signs and Symptoms  Outcome: Ongoing (interventions implemented as appropriate)    Goal: Skin Health and Integrity  Outcome: Ongoing (interventions implemented as appropriate)      Problem: Infection, Risk/Actual (Adult)  Goal: Identify Related Risk Factors and Signs and Symptoms  Outcome: Ongoing (interventions implemented as appropriate)    Goal: Infection Prevention/Resolution  Outcome: Ongoing (interventions implemented as appropriate)      Problem: Patient Care Overview  Goal: Plan of Care Review  Outcome: Ongoing (interventions implemented as appropriate)    Goal: Individualization and Mutuality  Outcome: Ongoing (interventions implemented as appropriate)    Goal: Discharge Needs Assessment  Outcome: Ongoing (interventions implemented as appropriate)

## 2018-06-07 NOTE — ED PROVIDER NOTES
Subjective   Eliane Zamora is an 88 y.o.female who presents to the ED status post fall. Per EMS, the patient fell from her bed this morning. There is no report of her hitting her head or experiencing any loss of consciousness during the fall. EMS took the patient's blood pressure en route to the ED, which measured 142/64. Here in the ED, the patient denies any pain in her extremities, dysuria, frequency, chest pain, difficulty breathing, abdominal pain, nausea, vomiting, or any other complaints at this time. However, the patient's history is limited due to her current medical condition and underlying dementia.          History provided by:  Patient and EMS personnel  History limited by:  Dementia and acuity of condition  Fall   Mechanism of injury: fall    Incident location:  Home  Arrived directly from scene: yes    Fall:     Fall occurred:  From a bed    Impact surface:  Unable to specify    Point of impact:  Unable to specify    Entrapped after fall: no    Protective equipment: none    Suspicion of alcohol use: no    Suspicion of drug use: no    Prior to arrival data:     Blood loss:  None    Responsiveness at scene:  Alert      Review of Systems   Unable to perform ROS: Acuity of condition   Constitutional:        Fall       Past Medical History:   Diagnosis Date   • Abnormal stress echo     History of abnormal stress echocardiogram with recent echocardiogram showing borderline LV function.    • Dementia    • GERD (gastroesophageal reflux disease)    • Hypercholesterolemia    • Hypothyroidism    • Osteoporosis     /Mild degenerative joint disease   • PAF (paroxysmal atrial fibrillation)    • UTI (urinary tract infection)     Current urinary tract infection by laboratory value, 04/26/2013:  Less than 100,000 units/mL of E. coli.       Allergies   Allergen Reactions   • Codeine      Severe nausea and vomiting       Past Surgical History:   Procedure Laterality Date   • BREAST SURGERY      benign breast biopsy    •  SECTION         History reviewed. No pertinent family history.    Social History     Social History   • Marital status:      Social History Main Topics   • Smoking status: Never Smoker   • Smokeless tobacco: Never Used   • Alcohol use 0.6 oz/week     1 Glasses of wine per week      Comment: occasionally   • Drug use: No   • Sexual activity: Defer     Other Topics Concern   • Not on file         Objective   Physical Exam   Constitutional: She appears well-developed and well-nourished. No distress.   Medical history is limited due to her medical condition and underlying dementia.    HENT:   Head: Normocephalic and atraumatic.   Nose: Nose normal.   Dry mucous membranes. Tongue is furrowed.    Eyes: Conjunctivae are normal. No scleral icterus.   Neck: Normal range of motion. Neck supple.   Cardiovascular: Normal rate, regular rhythm and normal heart sounds.    No murmur heard.  Pulmonary/Chest: Effort normal. No respiratory distress. She has rales.   Mild crackles in the bilateral bases.   Abdominal: Soft. Bowel sounds are normal. There is no tenderness.   No focal area of tenderness, although she does report the urge to urinate with palpation to the abdominal area.   Genitourinary:   Genitourinary Comments: Rectal temperature of 103.5.   Musculoskeletal: Normal range of motion. She exhibits edema.   1+ edema at level of the ankles bilaterally. Equal calf size.   Neurological: She is alert. No cranial nerve deficit.   Awake, alert, and oriented to person. Moves all extremities without difficulty. No focal deficits.   Skin: Skin is warm and dry.   Nursing note and vitals reviewed.      Critical Care  Performed by: ITZ HENRY  Authorized by: ITZ HENRY     Critical care provider statement:     Critical care time (minutes):  35    Critical care time was exclusive of:  Separately billable procedures and treating other patients    Critical care was necessary to treat or prevent  imminent or life-threatening deterioration of the following conditions:  Sepsis    Critical care was time spent personally by me on the following activities:  Development of treatment plan with patient or surrogate, discussions with consultants, evaluation of patient's response to treatment, examination of patient, blood draw for specimens, obtaining history from patient or surrogate, ordering and review of laboratory studies, ordering and review of radiographic studies, ordering and performing treatments and interventions, re-evaluation of patient's condition, pulse oximetry and review of old charts             ED Course  ED Course as of Jun 07 1033   Thu Jun 07, 2018   0916 Dr. Alvarez Morales, the Hospitalist, who will admit the patient.  [CT]      ED Course User Index  [CT] Michael Alexis      Recent Results (from the past 24 hour(s))   Urinalysis With / Culture If Indicated - Urine, Catheter    Collection Time: 06/07/18  7:47 AM   Result Value Ref Range    Color, UA Yellow Yellow, Straw    Appearance, UA Cloudy (A) Clear    pH, UA 6.0 5.0 - 8.0    Specific Gravity, UA 1.030 1.005 - 1.030    Glucose, UA Negative Negative    Ketones, UA Negative Negative    Bilirubin, UA Small (1+) (A) Negative    Blood, UA Large (3+) (A) Negative    Protein,  mg/dL (2+) (A) Negative    Leuk Esterase, UA Large (3+) (A) Negative    Nitrite, UA Negative Negative    Urobilinogen, UA 0.2 E.U./dL 0.2 - 1.0 E.U./dL   Urinalysis, Microscopic Only - Urine, Clean Catch    Collection Time: 06/07/18  7:47 AM   Result Value Ref Range    RBC, UA 3-6 (A) None Seen, 0-2 /HPF    WBC, UA Too Numerous to Count (A) None Seen, 0-2 /HPF    Bacteria, UA 1+ (A) None Seen, Trace /HPF    Squamous Epithelial Cells, UA 3-6 (A) None Seen, 0-2 /HPF    Transitional Epithelial Cells, UA 0-2 0 - 2 /HPF    Hyaline Casts, UA 0-6 0 - 6 /LPF    Methodology Manual Light Microscopy    Comprehensive Metabolic Panel    Collection Time: 06/07/18  7:51 AM    Result Value Ref Range    Glucose 106 (H) 70 - 100 mg/dL    BUN 22 9 - 23 mg/dL    Creatinine 1.23 0.60 - 1.30 mg/dL    Sodium 137 132 - 146 mmol/L    Potassium 3.7 3.5 - 5.5 mmol/L    Chloride 102 99 - 109 mmol/L    CO2 28.0 20.0 - 31.0 mmol/L    Calcium 8.1 (L) 8.7 - 10.4 mg/dL    Total Protein 6.2 5.7 - 8.2 g/dL    Albumin 3.65 3.20 - 4.80 g/dL    ALT (SGPT) 23 7 - 40 U/L    AST (SGOT) 29 0 - 33 U/L    Alkaline Phosphatase 126 (H) 25 - 100 U/L    Total Bilirubin 0.8 0.3 - 1.2 mg/dL    eGFR Non African Amer 41 (L) >60 mL/min/1.73    Globulin 2.6 gm/dL    A/G Ratio 1.4 (L) 1.5 - 2.5 g/dL    BUN/Creatinine Ratio 17.9 7.0 - 25.0    Anion Gap 7.0 3.0 - 11.0 mmol/L   Lactic Acid, Plasma    Collection Time: 06/07/18  7:51 AM   Result Value Ref Range    Lactate 0.9 0.5 - 2.0 mmol/L   CBC Auto Differential    Collection Time: 06/07/18  7:51 AM   Result Value Ref Range    WBC 12.34 (H) 3.50 - 10.80 10*3/mm3    RBC 4.12 3.89 - 5.14 10*6/mm3    Hemoglobin 12.2 11.5 - 15.5 g/dL    Hematocrit 38.1 34.5 - 44.0 %    MCV 92.5 80.0 - 99.0 fL    MCH 29.6 27.0 - 31.0 pg    MCHC 32.0 32.0 - 36.0 g/dL    RDW 14.1 11.3 - 14.5 %    RDW-SD 47.9 37.0 - 54.0 fl    MPV 9.4 6.0 - 12.0 fL    Platelets 269 150 - 450 10*3/mm3    Neutrophil % 93.0 (H) 41.0 - 71.0 %    Lymphocyte % 1.9 (L) 24.0 - 44.0 %    Monocyte % 4.9 0.0 - 12.0 %    Eosinophil % 0.1 0.0 - 3.0 %    Basophil % 0.1 0.0 - 1.0 %    Immature Grans % 0.2 0.0 - 0.6 %    Neutrophils, Absolute 11.49 (H) 1.50 - 8.30 10*3/mm3    Lymphocytes, Absolute 0.23 (L) 0.60 - 4.80 10*3/mm3    Monocytes, Absolute 0.60 0.00 - 1.00 10*3/mm3    Eosinophils, Absolute 0.01 0.00 - 0.30 10*3/mm3    Basophils, Absolute 0.01 0.00 - 0.20 10*3/mm3    Immature Grans, Absolute 0.03 0.00 - 0.03 10*3/mm3   Light Blue Top    Collection Time: 06/07/18  7:51 AM   Result Value Ref Range    Extra Tube hold for add-on    Green Top (Gel)    Collection Time: 06/07/18  7:51 AM   Result Value Ref Range    Extra Tube Hold  "for add-ons.    Lavender Top    Collection Time: 06/07/18  7:51 AM   Result Value Ref Range    Extra Tube hold for add-on    Gold Top - SST    Collection Time: 06/07/18  7:51 AM   Result Value Ref Range    Extra Tube Hold for add-ons.    Procalcitonin    Collection Time: 06/07/18  7:51 AM   Result Value Ref Range    Procalcitonin 0.74 (H) <=0.25 ng/mL   Troponin    Collection Time: 06/07/18  7:51 AM   Result Value Ref Range    Troponin I 0.021 <=0.039 ng/mL   POC Troponin, Rapid    Collection Time: 06/07/18 10:09 AM   Result Value Ref Range    Troponin I 0.01 0.00 - 0.07 ng/mL     Note: In addition to lab results from this visit, the labs listed above may include labs taken at another facility or during a different encounter within the last 24 hours. Please correlate lab times with ED admission and discharge times for further clarification of the services performed during this visit.    CT Head Without Contrast   Preliminary Result   Chronic findings without acute abnormality.       D:  06/07/2018   E:  06/07/2018                  XR Chest 1 View   Preliminary Result   New 4 cm right perihilar opacity. Neoplasm and pneumonia   are the differential considerations. Follow up radiographs are needed in   8 weeks to document resolution; if unresolved at that time, CT chest   with contrast would be indicated to exclude neoplasm.       D:  06/07/2018   E:  06/07/2018                    Vitals:    06/07/18 0746 06/07/18 0757 06/07/18 0900 06/07/18 1000   BP:  126/60 136/63 119/57   BP Location:  Right arm     Patient Position:  Sitting     Pulse:  96 93 79   Resp:  18     Temp: (!) 103.5 °F (39.7 °C)      TempSrc: Rectal      SpO2:  96% 95% 97%   Weight:  45.4 kg (100 lb)     Height:  152.4 cm (60\")       Medications   sodium chloride 0.9 % flush 10 mL (not administered)   sodium chloride 0.9 % bolus 2,000 mL (0 mL Intravenous Stopped 6/7/18 1002)   piperacillin-tazobactam (ZOSYN) 4.5 g in iso-osmotic dextrose 100 mL IVPB " (premix) (0 g Intravenous Stopped 6/7/18 0902)   vancomycin (VANCOCIN) in iso-osmotic dextrose IVPB 1 g (premix) 200 mL (0 mg Intravenous Stopped 6/7/18 0932)   acetaminophen (TYLENOL) tablet 1,000 mg (1,000 mg Oral Given 6/7/18 0901)     ECG/EMG Results (last 24 hours)     ** No results found for the last 24 hours. **                          MDM  Number of Diagnoses or Management Options  Dehydration, moderate: new and requires workup  Pneumonia of right lung due to infectious organism, unspecified part of lung: new and requires workup  Sepsis, due to unspecified organism: new and requires workup  Urinary tract infection with hematuria, site unspecified: new and requires workup  Diagnosis management comments: Presentation is consistent with dehydration, sepsis, felt to be secondary to pneumonia versus urinary tract infection.    Blood cultures have been obtained, 2 L of IV fluids have been given in the form of a bolus, and the patient has been initiated on antibiotics in the form of Zosyn and vancomycin.    The patient's blood pressure has remained stable.    Patient's mentation has remained altered, however the patient has known history of underlying dementia and her current mentation seems to be consistent with dementia.  CT scan of the head does not show any acute abnormalities.     Chest x-ray reports new right perihilar infiltrate versus nodule.  In the setting of the current fever, and leukocytosis, this is being treated currently as pneumonia.  Further imaging may be necessary in the future.     Discussed the patient with the hospitalist, Dr. Morales, who will admit.       Amount and/or Complexity of Data Reviewed  Clinical lab tests: ordered and reviewed  Tests in the radiology section of CPT®: ordered and reviewed  Obtain history from someone other than the patient: yes  Review and summarize past medical records: yes  Discuss the patient with other providers: yes  Independent visualization of images,  tracings, or specimens: yes    Risk of Complications, Morbidity, and/or Mortality  Presenting problems: high  Diagnostic procedures: high  Management options: high    Critical Care  Total time providing critical care: 30-74 minutes    Patient Progress  Patient progress: stable      Final diagnoses:   Sepsis, due to unspecified organism   Pneumonia of right lung due to infectious organism, unspecified part of lung   Urinary tract infection with hematuria, site unspecified   Dehydration, moderate       Documentation assistance provided by ines Alexis.  Information recorded by the scribe was done at my direction and has been verified and validated by me.     Michael Alexis  06/07/18 0818       Jaida Toledo  06/07/18 0836       Anna Pino MD  06/07/18 1030

## 2018-06-07 NOTE — THERAPY EVALUATION
Acute Care - Speech Language Pathology   Swallow Initial Evaluation [facility]   Clinical Swallow Evaluation     Patient Name: Eliane Zamora  : 1929  MRN: [id number]  Today's Date: 2018               Admit Date: 2018    Visit Dx:     ICD-10-CM ICD-9-CM   1. Sepsis, due to unspecified organism A41.9 038.9     995.91   2. Pneumonia of right lung due to infectious organism, unspecified part of lung J18.9 483.8   3. Urinary tract infection with hematuria, site unspecified N39.0 599.0    R31.9    4. Dehydration, moderate E86.0 276.51     Patient Active Problem List   Diagnosis   • PAF (paroxysmal atrial fibrillation)   • Hypothyroidism   • Abnormal stress echo   • Osteoporosis   • Hypercholesterolemia   • GERD (gastroesophageal reflux disease)   • Sepsis     Past Medical History:   Diagnosis Date   • Abnormal stress echo     History of abnormal stress echocardiogram with recent echocardiogram showing borderline LV function.    • Dementia    • GERD (gastroesophageal reflux disease)    • Hypercholesterolemia    • Hypothyroidism    • Osteoporosis     /Mild degenerative joint disease   • PAF (paroxysmal atrial fibrillation)    • UTI (urinary tract infection)     Current urinary tract infection by laboratory value, 2013:  Less than 100,000 units/mL of E. coli.     Past Surgical History:   Procedure Laterality Date   • BREAST SURGERY      benign breast biopsy   •  SECTION            SWALLOW EVALUATION (last 72 hours)      [facility] Adult Swallow Evaluation     Row Name 18 1702                   Rehab Evaluation    Document Type evaluation  -AC        Subjective Information no complaints  -AC        Patient Observations alert;cooperative  -AC        Patient/Family Observations Pt's  @ bedside.  -AC        Patient Effort good  -AC           General Information    Patient Profile Reviewed yes  -AC        Pertinent History Of Current Problem Adm w/ sepsis. R/o asp pna per consult. NPO until  SLP eval. CT Chest concerning for RLL pna. CT Head: no acute chngs. Hx GERD, osteoporosis, dementia. [place].   -AC        Current Method of Nutrition NPO  -AC        Precautions/Limitations, Vision WFL;for purposes of eval  -AC        Precautions/Limitations, Hearing hearing impairment, bilaterally  -AC        Prior Level of Function-Communication other (see comments)   baseline dementia per chart  -AC        Prior Level of Function-Swallowing no diet consistency restrictions  -AC        Plans/Goals Discussed with patient;spouse/S.O.;agreed upon  -AC        Barriers to Rehab cognitive status  -AC        Patient's Goals for Discharge patient did not state  -AC        Family Goals for Discharge family did not state  -AC           Pain Assessment    Additional Documentation Pain Scale: FACES Pre/Post-Treatment (Group)  -AC           Pain Scale: FACES Pre/Post-Treatment    Pain: FACES Scale, Pretreatment 0-->no hurt  -AC        Pain: FACES Scale, Post-Treatment 0-->no hurt  -AC           Oral Motor and Function    Dentition Assessment upper dentures/partial in place;lower dentures/partial in place;poor oral hygiene  -AC        Secretion Management WNL/WFL  -AC        Mucosal Quality dry;sticky  -AC        Volitional Swallow WFL  -AC        Volitional Cough WFL  -AC           Oral Musculature and Cranial Nerve Assessment    Oral Motor, Comment Suspect generalized oral wknss.  -AC           General Eating/Swallowing Observations    Eating/Swallowing Skills self-fed;fed by SLP  -AC        Positioning During Eating upright 90 degree;upright in bed  -AC        Utensils Used spoon;cup;other (see comments)   pt reported she never uses a straw (preference)  -AC        Consistencies Trialed thin liquids;pureed;regular textures  -AC           Clinical Swallow Eval    Oral Prep Phase WFL  -AC        Oral Transit WFL  -AC        Oral Residue WFL  -AC        Clinical Swallow Evaluation Summary No overt clinical s/sxs aspiration noted  w/ any consistency trialed - even when pushed w/ consecutive trials. Given concern for aspiration pna/RLL pna, rec'd instrumental swallow eval to further assess swallow fxn.  -AC           Clinical Impression    SLP Swallowing Diagnosis other (see comments)   r/o pharyngeal dysphagia  -AC        Functional Impact risk of aspiration/pneumonia;risk of malnutrition;risk of dehydration  -AC        Rehab Potential/Prognosis, Swallowing good, to achieve stated therapy goals  -AC        Criteria for Skilled Therapeutic Interventions Met demonstrates skilled criteria  -AC           Recommendations    SLP Diet Recommendation regular textures;thin liquids;other (see comments)   consider NPO if s/s asp noted prior to instrumental  -AC        Recommended Diagnostics FEES  -AC        Recommended Precautions and Strategies upright posture during/after eating;small bites of food and sips of liquid;other (see comments)   general aspiration precautions & oral care BID & PRN  -AC        SLP Rec. for Method of Medication Administration meds whole;meds crushed;with pudding or applesauce;as tolerated  -AC        Monitor for Signs of Aspiration yes;notify SLP if any concerns;cough;gurgly voice;throat clearing  -AC        Anticipated Dischage Disposition unknown  -          User Key  (r) = Recorded By, (t) = Taken By, (c) = Cosigned By    Initials Name Effective Dates     Marilyn Davenport MS CCC-SLP 07/27/17 -         EDUCATION  The patient has been educated in the following areas:   Dysphagia (Swallowing Impairment) Oral Care/Hydration.    SLP Recommendation and Plan  SLP Swallowing Diagnosis: other (see comments) (r/o pharyngeal dysphagia)  SLP Diet Recommendation: regular textures, thin liquids, other (see comments) (consider NPO if s/s asp noted prior to instrumental)  Recommended Precautions and Strategies: upright posture during/after eating, small bites of food and sips of liquid, other (see comments) (general aspiration  precautions & oral care BID & PRN)     Monitor for Signs of Aspiration: yes, notify SLP if any concerns, cough, gurgly voice, throat clearing  Recommended Diagnostics: FEES  Criteria for Skilled Therapeutic Interventions Met: demonstrates skilled criteria  Anticipated Dischage Disposition: unknown  Rehab Potential/Prognosis, Swallowing: good, to achieve stated therapy goals    Plan of Care Reviewed With: patient, spouse  Plan of Care Review  Plan of Care Reviewed With: patient, spouse             Time Calculation:         Time Calculation- SLP     Row Name 06/07/18 1741             Time Calculation- SLP    SLP Start Time 1700  -      SLP Received On 06/07/18  -        User Key  (r) = Recorded By, (t) = Taken By, (c) = Cosigned By    Initials Name Provider Type     Marilyn Davenport MS CCC-SLP Speech and Language Pathologist          Therapy Charges for Today     Code Description Service Date Service Provider Modifiers Qty    [id number]  ST EVAL ORAL PHARYNG SWALLOW 3 6/7/2018 Marilyn Davenport MS CCC-SLP GN 1               Marilyn Davenport MS CCC-SLP  6/7/2018

## 2018-06-07 NOTE — H&P
[facility] Medicine Services  HISTORY AND PHYSICAL    Patient Name: Eliane Zamora  : 1929  MRN: [id number]  Primary Care Physician: Jack Chilel MD    Subjective   Subjective     Chief Complaint:  fall    HPI:  Eliane Zamora is a 88 y.o. female with underlying dementia, who presents to the ED status post fall along with her  who is [place] and also a poor historian. Per EMS, ED physician and , the patient fell from her bed this morning. There is no report of her hitting her head or experiencing any loss of consciousness during the fall. EMS took the patient's blood pressure en route to the ED, which measured 142/64. On my encounter the patient denies any pain in her extremities, dysuria, frequency, chest pain, difficulty breathing, abdominal pain, nausea, vomiting, or any other complaints at this time. The patient's history and ROS is limited due to her hearing deficit and underlying dementia.  Pt noted to have a rectal temp of 103.5  Troponin negative.  CMP unremarkable, CXR showed new 4 cm right perihilar opacity. Neoplasm and pneumonia  are the differential considerations.  WBC 12.3.    UA grossly positive for UTI.  Blood Cx and Urine Cx collected.  CT Head no acute findings.  Pt given zosyn and vanc in ED and will be admitted for further evaluation and management.    Review of Systems   Gen- No fevers, chills  CV- No chest pain, palpitations  Resp- No cough, dyspnea  GI- No N/V/D, abd pain    Otherwise 10-system ROS reviewed and is negative except as mentioned in the HPI.    Personal History     Past Medical History:   Diagnosis Date   • Abnormal stress echo     History of abnormal stress echocardiogram with recent echocardiogram showing borderline LV function.    • Dementia    • GERD (gastroesophageal reflux disease)    • Hypercholesterolemia    • Hypothyroidism    • Osteoporosis     /Mild degenerative joint disease   • PAF (paroxysmal atrial fibrillation)     • UTI (urinary tract infection)     Current urinary tract infection by laboratory value, 2013:  Less than 100,000 units/mL of E. coli.       Past Surgical History:   Procedure Laterality Date   • BREAST SURGERY      benign breast biopsy   •  SECTION         Family History: family history is not on file.     Social History:  reports that she has never smoked. She has never used smokeless tobacco. She reports that she drinks about 0.6 oz of alcohol per week . She reports that she does not use drugs.  Social History     Social History Narrative   • No narrative on file       Medications:    (Not in a hospital admission)    Allergies   Allergen Reactions   • Codeine      Severe nausea and vomiting       Objective   Objective     Vital Signs:   Temp:  [103.5 °F (39.7 °C)] 103.5 °F (39.7 °C)  Heart Rate:  [79-96] 79  Resp:  [18] 18  BP: (119-136)/(57-63) 119/57        Physical Exam   Constitutional: No acute distress, awake, alert  Eyes: PERRLA, sclerae anicteric, no conjunctival injection  HENT: NCAT, mucous membranes moist  Neck: Supple, no thyromegaly, no lymphadenopathy, trachea midline  Respiratory: Mild crackles in the bilateral bases, nonlabored respirations   Cardiovascular: RRR, no murmurs, rubs, or gallops, palpable pedal pulses bilaterally  Gastrointestinal: Positive bowel sounds, soft, nontender, nondistended  Musculoskeletal: 1+ edema at level of the ankles bilaterally, severe atrophy of extremities and generalized weakness.  Psychiatric: Appropriate affect, cooperative  Neurologic: Oriented x 1, Cranial Nerves grossly intact to confrontation, speech clear, no focal deficits.  Skin: No rashes, no jaundice.      Results Reviewed:  I have personally reviewed current lab, radiology, and data and agree.      Results from last 7 days  Lab Units 18  0751   WBC 10*3/mm3 12.34*   HEMOGLOBIN g/dL 12.2   HEMATOCRIT % 38.1   PLATELETS 10*3/mm3 269       Results from last 7 days  Lab Units  06/07/18  0751   SODIUM mmol/L 137   POTASSIUM mmol/L 3.7   CHLORIDE mmol/L 102   CO2 mmol/L 28.0   BUN mg/dL 22   CREATININE mg/dL 1.23   GLUCOSE mg/dL 106*   CALCIUM mg/dL 8.1*   ALT (SGPT) U/L 23   AST (SGOT) U/L 29   TROPONIN I ng/mL 0.021     Estimated Creatinine Clearance: 22.7 mL/min (by C-G formula based on SCr of 1.23 mg/dL).  Brief Urine Lab Results  (Last result in the past 365 days)      Color   Clarity   Blood   Leuk Est   Nitrite   Protein   CREAT   Urine HCG        06/07/18 0747 Yellow Cloudy(A) Large (3+)(A) Large (3+)(A) Negative 100 mg/dL (2+)(A)             No results found for: BNP  No results found for: PHART  Imaging Results (last 24 hours)     Procedure Component Value Units Date/Time    CT Head Without Contrast [[id number]] Collected:  06/07/18 0850     Updated:  06/07/18 0850    Narrative:       EXAMINATION: CT HEAD WO CONTRAST- 06/07/2018     INDICATION:  Altered mental status     TECHNIQUE:  Axial CT data of the head were acquired helically without  contrast. Multiplanar reformatted images were generated and reviewed.  Dose reducing methods were employed.     The radiation dose reduction device was turned on for each scan per the  ALARA (As Low as Reasonably Achievable) protocol.      COMPARISONS:  03/29/2015     FINDINGS:     Advanced parenchymal volume loss and extensive nonspecific white matter  hypodensity is again noted. There is no intracranial hemorrhage or mass  effect. There is no calvarial destructive lesion or fracture. No sinus  mucosal thickening.        Impression:       Chronic findings without acute abnormality.     D:  06/07/2018  E:  06/07/2018             XR Chest 1 View [[id number]] Collected:  06/07/18 0839     Updated:  06/07/18 0839    Narrative:       EXAMINATION: XR CHEST 1 VW-06/07/2018:      INDICATION:  Fever, altered mental status.     TECHNIQUE:  Single view frontal chest.     COMPARISONS:  03/30/2018.     FINDINGS:  There is a new 4 cm right perihilar opacity,  developed since  March 30, 2018. Chronic interstitial prominence. No pleural effusion or  pneumothorax. Atherosclerosis. Unremarkable cardiomediastinal  silhouette. Old right lateral sixth rib fracture. Old distal left  clavicle fracture.       Impression:       New 4 cm right perihilar opacity. Neoplasm and pneumonia  are the differential considerations. Follow up radiographs are needed in  8 weeks to document resolution; if unresolved at that time, CT chest  with contrast would be indicated to exclude neoplasm.     D:  06/07/2018  E:  06/07/2018                      Assessment/Plan   Assessment / Plan     Hospital Problem List     Sepsis            Assessment & Plan:  Sepsis due to PNA and UTI  --treating primiary causes as per below    Possible aspiration PNA  -continue zosyn and vanc for now, start probiotic  -follow blood cx, ordered sputum cx, urine ag for legionella and strep.  -NPO for now SLP eval to rule out dysphagia.    New 4 cm right perihilar opacity.   -could be Neoplasm vs pneumonia, will get CT chest     UTI  -continue zosyn IV, follow urine cx.    Dementia  -unknown baseline    Fall with generalized weakness.  -CT head no acute process.  -check ECHO.  -PT/OT/CM consults    H/o PAF  -Med rec listing amiodarone and high dose diltiazem, will wait for pharmacist to reconcile meds prior to ordering.    Malnutrition  -Dietitian consult.  IVF NS stop after 1 liter.    DVT prophylaxis:  lovenox    CODE STATUS:  Full Code    Admission Status:  I believe this patient meets INPATIENT status due to the need for care which can only be reasonably provided in an hospital setting such as aggressive/expedited ancillary services and/or consultation services, the necessity for IV medications, close physician monitoring and/or the possible need for procedures.  In such, I feel patient’s risk for adverse outcomes and need for care warrant INPATIENT evaluation and predict the patient’s care encounter to likely last  beyond 2 midnights.      Electronically signed by Barry Morales MD, 06/07/18, 10:02 AM.

## 2018-06-08 ENCOUNTER — ANCILLARY PROCEDURE (OUTPATIENT)
Dept: SPEECH THERAPY | Facility: HOSPITAL | Age: 83
End: 2018-06-08
Attending: FAMILY MEDICINE

## 2018-06-08 PROBLEM — N39.0 UTI (URINARY TRACT INFECTION): Status: ACTIVE | Noted: 2018-06-08

## 2018-06-08 PROBLEM — F03.90 DEMENTIA (HCC): Status: ACTIVE | Noted: 2018-06-08

## 2018-06-08 PROBLEM — J18.9 RIGHT LOWER LOBE PNEUMONIA: Status: ACTIVE | Noted: 2018-06-08

## 2018-06-08 LAB
ANION GAP SERPL CALCULATED.3IONS-SCNC: 3 MMOL/L (ref 3–11)
BASOPHILS # BLD AUTO: 0.04 10*3/MM3 (ref 0–0.2)
BASOPHILS NFR BLD AUTO: 0.3 % (ref 0–1)
BH CV ECHO MEAS - AO MAX PG (FULL): 6.5 MMHG
BH CV ECHO MEAS - AO MAX PG: 9 MMHG
BH CV ECHO MEAS - AO MEAN PG (FULL): 3.5 MMHG
BH CV ECHO MEAS - AO MEAN PG: 5.1 MMHG
BH CV ECHO MEAS - AO ROOT AREA (BSA CORRECTED): 2
BH CV ECHO MEAS - AO ROOT AREA: 6 CM^2
BH CV ECHO MEAS - AO ROOT DIAM: 2.8 CM
BH CV ECHO MEAS - AO V2 MAX: 152.1 CM/SEC
BH CV ECHO MEAS - AO V2 MEAN: 104.3 CM/SEC
BH CV ECHO MEAS - AO V2 VTI: 33.8 CM
BH CV ECHO MEAS - AVA(I,A): 1.6 CM^2
BH CV ECHO MEAS - AVA(I,D): 1.6 CM^2
BH CV ECHO MEAS - AVA(V,A): 1.2 CM^2
BH CV ECHO MEAS - AVA(V,D): 1.2 CM^2
BH CV ECHO MEAS - BSA(HAYCOCK): 1.4 M^2
BH CV ECHO MEAS - BSA: 1.4 M^2
BH CV ECHO MEAS - BZI_BMI: 19.5 KILOGRAMS/M^2
BH CV ECHO MEAS - BZI_METRIC_HEIGHT: 152.4 CM
BH CV ECHO MEAS - BZI_METRIC_WEIGHT: 45.4 KG
BH CV ECHO MEAS - CONTRAST EF (2CH): 66.7 ML/M^2
BH CV ECHO MEAS - CONTRAST EF 4CH: 59.3 ML/M^2
BH CV ECHO MEAS - EDV(CUBED): 45 ML
BH CV ECHO MEAS - EDV(MOD-SP2): 51 ML
BH CV ECHO MEAS - EDV(MOD-SP4): 54 ML
BH CV ECHO MEAS - EDV(TEICH): 52.9 ML
BH CV ECHO MEAS - EF(CUBED): 73.9 %
BH CV ECHO MEAS - EF(MOD-SP2): 66.7 %
BH CV ECHO MEAS - EF(MOD-SP4): 59.3 %
BH CV ECHO MEAS - EF(TEICH): 66.7 %
BH CV ECHO MEAS - ESV(CUBED): 11.8 ML
BH CV ECHO MEAS - ESV(MOD-SP2): 17 ML
BH CV ECHO MEAS - ESV(MOD-SP4): 22 ML
BH CV ECHO MEAS - ESV(TEICH): 17.6 ML
BH CV ECHO MEAS - FS: 36.1 %
BH CV ECHO MEAS - IVS/LVPW: 1.2
BH CV ECHO MEAS - IVSD: 0.97 CM
BH CV ECHO MEAS - LAT PEAK E' VEL: 9.6 CM/SEC
BH CV ECHO MEAS - LV DIASTOLIC VOL/BSA (35-75): 38.8 ML/M^2
BH CV ECHO MEAS - LV MASS(C)D: 88.6 GRAMS
BH CV ECHO MEAS - LV MASS(C)DI: 63.8 GRAMS/M^2
BH CV ECHO MEAS - LV MAX PG: 2.5 MMHG
BH CV ECHO MEAS - LV MEAN PG: 1.5 MMHG
BH CV ECHO MEAS - LV SYSTOLIC VOL/BSA (12-30): 15.8 ML/M^2
BH CV ECHO MEAS - LV V1 MAX: 79.3 CM/SEC
BH CV ECHO MEAS - LV V1 MEAN: 59 CM/SEC
BH CV ECHO MEAS - LV V1 VTI: 22.2 CM
BH CV ECHO MEAS - LVIDD: 3.6 CM
BH CV ECHO MEAS - LVIDS: 2.3 CM
BH CV ECHO MEAS - LVLD AP2: 6.9 CM
BH CV ECHO MEAS - LVLD AP4: 6.9 CM
BH CV ECHO MEAS - LVLS AP2: 5.9 CM
BH CV ECHO MEAS - LVLS AP4: 5.9 CM
BH CV ECHO MEAS - LVOT AREA (M): 2.3 CM^2
BH CV ECHO MEAS - LVOT AREA: 2.4 CM^2
BH CV ECHO MEAS - LVOT DIAM: 1.7 CM
BH CV ECHO MEAS - LVPWD: 0.8 CM
BH CV ECHO MEAS - MED PEAK E' VEL: 6.93 CM/SEC
BH CV ECHO MEAS - MV A MAX VEL: 78.5 CM/SEC
BH CV ECHO MEAS - MV E MAX VEL: 83.9 CM/SEC
BH CV ECHO MEAS - MV E/A: 1.1
BH CV ECHO MEAS - PA ACC SLOPE: 560.7 CM/SEC^2
BH CV ECHO MEAS - PA ACC TIME: 0.12 SEC
BH CV ECHO MEAS - PA PR(ACCEL): 26.7 MMHG
BH CV ECHO MEAS - RAP SYSTOLE: 3 MMHG
BH CV ECHO MEAS - RVDD: 2.3 CM
BH CV ECHO MEAS - RVSP: 28 MMHG
BH CV ECHO MEAS - SI(AO): 146 ML/M^2
BH CV ECHO MEAS - SI(CUBED): 23.9 ML/M^2
BH CV ECHO MEAS - SI(LVOT): 37.9 ML/M^2
BH CV ECHO MEAS - SI(MOD-SP2): 24.5 ML/M^2
BH CV ECHO MEAS - SI(MOD-SP4): 23 ML/M^2
BH CV ECHO MEAS - SI(TEICH): 25.4 ML/M^2
BH CV ECHO MEAS - SV(AO): 203 ML
BH CV ECHO MEAS - SV(CUBED): 33.3 ML
BH CV ECHO MEAS - SV(LVOT): 52.8 ML
BH CV ECHO MEAS - SV(MOD-SP2): 34 ML
BH CV ECHO MEAS - SV(MOD-SP4): 32 ML
BH CV ECHO MEAS - SV(TEICH): 35.3 ML
BH CV ECHO MEAS - TAPSE (>1.6): 1.7 CM2
BH CV ECHO MEAS - TR MAX VEL: 249.8 CM/SEC
BH CV ECHO MEASUREMENTS AVERAGE E/E' RATIO: 10.15
BH CV VAS BP RIGHT ARM: NORMAL MMHG
BH CV XLRA - RV BASE: 3.1 CM
BH CV XLRA - RV LENGTH: 6.6 CM
BH CV XLRA - RV MID: 2.8 CM
BH CV XLRA - TDI S': 15.7 CM/SEC
BUN BLD-MCNC: 13 MG/DL (ref 9–23)
BUN/CREAT SERPL: 13 (ref 7–25)
CALCIUM SPEC-SCNC: 7.5 MG/DL (ref 8.7–10.4)
CHLORIDE SERPL-SCNC: 109 MMOL/L (ref 99–109)
CO2 SERPL-SCNC: 28 MMOL/L (ref 20–31)
CREAT BLD-MCNC: 1 MG/DL (ref 0.6–1.3)
D-LACTATE SERPL-SCNC: 0.7 MMOL/L (ref 0.5–2)
DEPRECATED RDW RBC AUTO: 49.1 FL (ref 37–54)
EOSINOPHIL # BLD AUTO: 0.08 10*3/MM3 (ref 0–0.3)
EOSINOPHIL NFR BLD AUTO: 0.7 % (ref 0–3)
ERYTHROCYTE [DISTWIDTH] IN BLOOD BY AUTOMATED COUNT: 14.2 % (ref 11.3–14.5)
GFR SERPL CREATININE-BSD FRML MDRD: 52 ML/MIN/1.73
GLUCOSE BLD-MCNC: 81 MG/DL (ref 70–100)
HCT VFR BLD AUTO: 34.4 % (ref 34.5–44)
HGB BLD-MCNC: 10.8 G/DL (ref 11.5–15.5)
IMM GRANULOCYTES # BLD: 0.02 10*3/MM3 (ref 0–0.03)
IMM GRANULOCYTES NFR BLD: 0.2 % (ref 0–0.6)
LEFT ATRIUM VOLUME INDEX: 43.2 ML/M2
LEFT ATRIUM VOLUME: 60 CM3
LYMPHOCYTES # BLD AUTO: 1.13 10*3/MM3 (ref 0.6–4.8)
LYMPHOCYTES NFR BLD AUTO: 9.7 % (ref 24–44)
MCH RBC QN AUTO: 29.4 PG (ref 27–31)
MCHC RBC AUTO-ENTMCNC: 31.4 G/DL (ref 32–36)
MCV RBC AUTO: 93.7 FL (ref 80–99)
MONOCYTES # BLD AUTO: 0.56 10*3/MM3 (ref 0–1)
MONOCYTES NFR BLD AUTO: 4.8 % (ref 0–12)
NEUTROPHILS # BLD AUTO: 9.84 10*3/MM3 (ref 1.5–8.3)
NEUTROPHILS NFR BLD AUTO: 84.3 % (ref 41–71)
PLATELET # BLD AUTO: 232 10*3/MM3 (ref 150–450)
PMV BLD AUTO: 9 FL (ref 6–12)
POTASSIUM BLD-SCNC: 3.9 MMOL/L (ref 3.5–5.5)
RBC # BLD AUTO: 3.67 10*6/MM3 (ref 3.89–5.14)
SODIUM BLD-SCNC: 140 MMOL/L (ref 132–146)
TSH SERPL DL<=0.05 MIU/L-ACNC: 2.23 MIU/ML (ref 0.35–5.35)
VANCOMYCIN SERPL-MCNC: 6.6 MCG/ML (ref 5–40)
WBC NRBC COR # BLD: 11.67 10*3/MM3 (ref 3.5–10.8)

## 2018-06-08 PROCEDURE — 84443 ASSAY THYROID STIM HORMONE: CPT | Performed by: FAMILY MEDICINE

## 2018-06-08 PROCEDURE — 85025 COMPLETE CBC W/AUTO DIFF WBC: CPT | Performed by: FAMILY MEDICINE

## 2018-06-08 PROCEDURE — 99233 SBSQ HOSP IP/OBS HIGH 50: CPT | Performed by: INTERNAL MEDICINE

## 2018-06-08 PROCEDURE — 92612 ENDOSCOPY SWALLOW (FEES) VID: CPT

## 2018-06-08 PROCEDURE — 83605 ASSAY OF LACTIC ACID: CPT | Performed by: FAMILY MEDICINE

## 2018-06-08 PROCEDURE — 25010000002 VANCOMYCIN PER 500 MG

## 2018-06-08 PROCEDURE — 80048 BASIC METABOLIC PNL TOTAL CA: CPT | Performed by: FAMILY MEDICINE

## 2018-06-08 PROCEDURE — 25010000002 CEFTRIAXONE PER 250 MG: Performed by: INTERNAL MEDICINE

## 2018-06-08 PROCEDURE — 25010000002 PIPERACILLIN SOD-TAZOBACTAM PER 1 G

## 2018-06-08 PROCEDURE — 80202 ASSAY OF VANCOMYCIN: CPT

## 2018-06-08 PROCEDURE — 25010000002 ENOXAPARIN PER 10 MG: Performed by: FAMILY MEDICINE

## 2018-06-08 PROCEDURE — 97166 OT EVAL MOD COMPLEX 45 MIN: CPT

## 2018-06-08 RX ORDER — DILTIAZEM HYDROCHLORIDE 120 MG/1
120 CAPSULE, COATED, EXTENDED RELEASE ORAL
Status: DISCONTINUED | OUTPATIENT
Start: 2018-06-08 | End: 2018-06-13 | Stop reason: HOSPADM

## 2018-06-08 RX ORDER — CEFTRIAXONE SODIUM 1 G/50ML
1 INJECTION, SOLUTION INTRAVENOUS EVERY 24 HOURS
Status: DISCONTINUED | OUTPATIENT
Start: 2018-06-08 | End: 2018-06-09

## 2018-06-08 RX ORDER — AMIODARONE HYDROCHLORIDE 200 MG/1
200 TABLET ORAL DAILY
Status: DISCONTINUED | OUTPATIENT
Start: 2018-06-08 | End: 2018-06-13 | Stop reason: HOSPADM

## 2018-06-08 RX ORDER — DOXYCYCLINE 100 MG/1
100 CAPSULE ORAL EVERY 12 HOURS SCHEDULED
Status: DISCONTINUED | OUTPATIENT
Start: 2018-06-08 | End: 2018-06-09

## 2018-06-08 RX ADMIN — VANCOMYCIN HYDROCHLORIDE 750 MG: 750 INJECTION, SOLUTION INTRAVENOUS at 11:12

## 2018-06-08 RX ADMIN — TAZOBACTAM SODIUM AND PIPERACILLIN SODIUM 3.38 G: 375; 3 INJECTION, SOLUTION INTRAVENOUS at 08:35

## 2018-06-08 RX ADMIN — CEFTRIAXONE SODIUM 1 G: 1 INJECTION, SOLUTION INTRAVENOUS at 20:05

## 2018-06-08 RX ADMIN — AMIODARONE HYDROCHLORIDE 200 MG: 200 TABLET ORAL at 16:26

## 2018-06-08 RX ADMIN — Medication 250 MG: at 20:05

## 2018-06-08 RX ADMIN — ENOXAPARIN SODIUM 30 MG: 30 INJECTION SUBCUTANEOUS at 16:27

## 2018-06-08 RX ADMIN — Medication 250 MG: at 08:35

## 2018-06-08 RX ADMIN — DILTIAZEM HYDROCHLORIDE 120 MG: 120 CAPSULE, EXTENDED RELEASE ORAL at 16:26

## 2018-06-08 RX ADMIN — LEVOTHYROXINE SODIUM 75 MCG: 75 TABLET ORAL at 08:35

## 2018-06-08 RX ADMIN — DOXYCYCLINE 100 MG: 100 CAPSULE ORAL at 20:05

## 2018-06-08 RX ADMIN — PANTOPRAZOLE SODIUM 40 MG: 40 TABLET, DELAYED RELEASE ORAL at 08:35

## 2018-06-08 NOTE — PLAN OF CARE
Problem: Patient Care Overview  Goal: Plan of Care Review  Outcome: Ongoing (interventions implemented as appropriate)   06/08/18 1119   Coping/Psychosocial   Plan of Care Reviewed With patient;spouse   OTHER   Outcome Summary OT IE completed. Pt Ox2 and [place]. Presents with generalized weakness, impaired balance and h/o falls. Mod A bed mobility. Min A STS. CGA fx'l mobility. Min A simple ADLs. Pt's 89 y/o Spouse assists with bathing/dressing at baseline. OT to follow. Pt would benefit from SNF level rehab for best outcome at d/c; pt/spouse want to return home.

## 2018-06-08 NOTE — PROGRESS NOTES
"Pharmacy Consult-Vancomycin Dosing  Eliane Zamora is a  88 y.o. female receiving vancomycin therapy.     Indication: Sepsis  Consulting Provider: Andrew DIAS  ID Consult: no    Goal Trough:15-20mcg/ml    Current Antimicrobial Therapy                        Vancomycin dosing per levels (Day: 2)            Pipercillin-Tazobactam 3.375g q12    Allergies  Allergies as of 06/07/2018 - Reviewed 06/07/2018   Allergen Reaction Noted   • Codeine  11/16/2016     Labs  Results from last 7 days   Lab Units 06/08/18  0714 06/07/18  0751   SODIUM mmol/L 140 137   POTASSIUM mmol/L 3.9 3.7   CHLORIDE mmol/L 109 102   CO2 mmol/L 28.0 28.0   BUN mg/dL 13 22   CREATININE mg/dL 1.00 1.23   GLUCOSE mg/dL 81 106*   CALCIUM mg/dL 7.5* 8.1*    Results from last 7 days   Lab Units 06/08/18  0714 06/07/18  0751   WBC 10*3/mm3 11.67* 12.34*   HEMOGLOBIN g/dL 10.8* 12.2   HEMATOCRIT % 34.4* 38.1   PLATELETS 10*3/mm3 232 269      Evaluation of Dosing     Ht - 152.4 cm (60\")  Wt - 45.4 kg (100 lb)    Estimated Creatinine Clearance: 27.9 mL/min (by C-G formula based on SCr of 1 mg/dL).    Intake & Output (last 3 days)         06/05 0701 - 06/06 0700 06/06 0701 - 06/07 0700 06/07 0701 - 06/08 0700 06/08 0701 - 06/09 0700    I.V. (mL/kg)   900 (19.8)     IV Piggyback   2350 100    Total Intake(mL/kg)   3250 (71.6) 100 (2.2)    Urine (mL/kg/hr)   225 125 (1.6)    Total Output     225 125    Net     +3025 -25            Unmeasured Urine Occurrence   2 x           Microbiology and Radiology  Microbiology Results (last 10 days)       Procedure Component Value - Date/Time    Blood Culture - Blood, [[id number]]  (Normal) Collected:  06/07/18 0755    Lab Status:  Preliminary result Specimen:  Blood from Arm, Left Updated:  06/08/18 0815     Blood Culture No growth at 24 hours    Blood Culture - Blood, [[id number]]  (Normal) Collected:  06/07/18 0750    Lab Status:  Preliminary result Specimen:  Blood from Arm, Right Updated:  06/08/18 0815     Blood Culture " No growth at 24 hours    Urine Culture - Urine, [[id number]]  (Abnormal) Collected:  06/07/18 0747    Lab Status:  Preliminary result Specimen:  Urine from Urine, Catheter Updated:  06/08/18 0810     Urine Culture >100,000 CFU/mL Gram Negative Bacilli (A)          Evaluation of Level    Lab Results   Component Value Date    DIDINDOM 6.60 06/08/2018      Assessment/Plan:     6/8 Vancomycin random level 6.6mcg/ml.  Give Vancomycin 750mg today.  Vancomycin random level scheduled 6/9 to determine further dosing.  Monitor for s/s nephrotxicity and infusion related reactions.    Bay Meyer, Pharmacy Intern  6/8/2018  8:42 AM

## 2018-06-08 NOTE — MBS/VFSS/FEES
Acute Care - Speech Language Pathology   Swallow Initial Evaluation [facility]   Fiberoptic Endoscopic Evaluation of Swallowing (FEES)       Patient Name: Eliane Zamora  : 1929  MRN: [id number]  Today's Date: 2018  Onset of Illness/Injury or Date of Surgery: 18     Referring Physician: Andrew      Admit Date: 2018    Visit Dx:     ICD-10-CM ICD-9-CM   1. Sepsis, due to unspecified organism A41.9 038.9     995.91   2. Pneumonia of right lung due to infectious organism, unspecified part of lung J18.9 483.8   3. Urinary tract infection with hematuria, site unspecified N39.0 599.0    R31.9    4. Dehydration, moderate E86.0 276.51   5. Impaired mobility and ADLs Z74.09 799.89     Patient Active Problem List   Diagnosis   • PAF (paroxysmal atrial fibrillation)   • Hypothyroidism   • Abnormal stress echo   • Osteoporosis   • Hypercholesterolemia   • GERD (gastroesophageal reflux disease)   • Sepsis     Past Medical History:   Diagnosis Date   • Abnormal stress echo     History of abnormal stress echocardiogram with recent echocardiogram showing borderline LV function.    • Dementia    • GERD (gastroesophageal reflux disease)    • Hypercholesterolemia    • Hypothyroidism    • Osteoporosis     /Mild degenerative joint disease   • PAF (paroxysmal atrial fibrillation)    • UTI (urinary tract infection)     Current urinary tract infection by laboratory value, 2013:  Less than 100,000 units/mL of E. coli.     Past Surgical History:   Procedure Laterality Date   • BREAST SURGERY      benign breast biopsy   •  SECTION            SWALLOW EVALUATION (last 72 hours)      [facility] Adult Swallow Evaluation     Row Name 18 1130 18 1700                Rehab Evaluation    Document Type evaluation  -AV evaluation  -AC       Subjective Information no complaints  -AV no complaints  -AC       Patient Observations alert;cooperative  -AV alert;cooperative  -AC       Patient/Family Observations   present   -AV Pt's  @ bedside.  -AC       Patient Effort good  -AV good  -AC       Comment Pt and spouse (95) with dementia and [place]  -AV  --          General Information    Patient Profile Reviewed yes  -AV yes  -AC       Pertinent History Of Current Problem Adm w/ sepsis. R/o asp pna per consult. NPO until SLP eval. CT Chest concerning for RLL pna. CT Head: no acute chngs. Hx GERD, osteoporosis, dementia. [place].   -AV Adm w/ sepsis. R/o asp pna per consult. NPO until SLP eval. CT Chest concerning for RLL pna. CT Head: no acute chngs. Hx GERD, osteoporosis, dementia. [place].   -AC       Current Method of Nutrition regular textures;thin liquids  -AV NPO  -AC       Precautions/Limitations, Vision WFL with corrective lenses  -AV WFL;for purposes of eval  -AC       Precautions/Limitations, Hearing hearing impairment, bilaterally  -AV hearing impairment, bilaterally  -AC       Prior Level of Function-Communication --   baseline dementia   -AV other (see comments)   baseline dementia per chart  -AC       Prior Level of Function-Swallowing no diet consistency restrictions  -AV no diet consistency restrictions  -AC       Plans/Goals Discussed with patient;spouse/S.O.  -AV patient;spouse/S.O.;agreed upon  -AC       Barriers to Rehab cognitive status  -AV cognitive status  -AC       Patient's Goals for Discharge patient could not state  -AV patient did not state  -AC       Family Goals for Discharge family did not state  -AV family did not state  -AC          Pain Assessment    Additional Documentation Pain Scale: Numbers Pre/Post-Treatment (Group)  -AV Pain Scale: FACES Pre/Post-Treatment (Group)  -AC          Pain Scale: Numbers Pre/Post-Treatment    Pain Scale: Numbers, Pretreatment 0/10 - no pain  -AV  --       Pain Scale: Numbers, Post-Treatment 0/10 - no pain  -AV  --          Pain Scale: FACES Pre/Post-Treatment    Pain: FACES Scale, Pretreatment  -- 0-->no hurt  -AC       Pain: FACES Scale, Post-Treatment   -- 0-->no hurt  -          Oral Motor and Function    Dentition Assessment  -- upper dentures/partial in place;lower dentures/partial in place;poor oral hygiene  -       Secretion Management  -- WNL/WFL  -AC       Mucosal Quality  -- dry;sticky  -       Volitional Swallow  -- WFL  -       Volitional Cough  -- WFL  -          Oral Musculature and Cranial Nerve Assessment    Oral Motor, Comment  -- Suspect generalized oral wknss.  -          General Eating/Swallowing Observations    Eating/Swallowing Skills  -- self-fed;fed by SLP  -       Positioning During Eating  -- upright 90 degree;upright in bed  -       Utensils Used  -- spoon;cup;other (see comments)   pt reported she never uses a straw (preference)  -       Consistencies Trialed  -- thin liquids;pureed;regular textures  -          Clinical Swallow Eval    Oral Prep Phase  -- WFL  -       Oral Transit  -- WFL  -       Oral Residue  -- L  -       Clinical Swallow Evaluation Summary  -- No overt clinical s/sxs aspiration noted w/ any consistency trialed - even when pushed w/ consecutive trials. Given concern for aspiration pna/RLL pna, rec'd instrumental swallow eval to further assess swallow fxn.  -          Fiberoptic Endoscopic Evaluation of Swallowing (FEES)    Risks/Benefits Reviewed risks/benefits explained;patient;agreed to eval  -AV  --       Nasal Entry right:  -AV  --          Anatomy and Physiology    Anatomic Considerations no anatomic structural deviation  -AV  --       Velopharyngeal WFL  -AV  --       Base of Tongue symmetrical  -AV  --       Epiglottis WFL  -AV  --       Laryngeal Function Breathing symmetrical  -AV  --       Laryngeal Function Phonation symmetrical  -AV  --       Laryngeal Function to Breath Hold CNA  -AV  --       Secretion Rating Scale (Thang et al. 1996) 1- secretions present around the laryngeal vestibule  -AV  --       Secretion Description thin;clear  -AV  --       Ice Chips DNA  -AV  --        Spontaneous Swallow frequency adequate;cleared secretions  -AV  --       Sensory sensed scope  -AV  --       Utensils Used Spoon;Cup;Straw  -AV  --       Consistencies Trialed thin liquids;pudding/puree;Regular textures;Spoon;cup;straw  -AV  --          FEES Interpretation    Oral Phase WFL  -AV  --          Initiation of Pharyngeal Swallow    Initiation of Pharyngeal Swallow WFL  -AV  --       Pharyngeal Phase functional pharyngeal phase of swallow  -AV  --       FEES Summary FEES completed this am:  Mild to trace residue of thins at posterior commissure outer (poster) rim that clears with subsequent swallows.  No aspiration noted during study.  All other residue unremarkable. Rec reg and thins as tolerates.  no further speech recs.   -AV  --          Clinical Impression    SLP Swallowing Diagnosis functional oral phase;functional pharyngeal phase  -AV other (see comments)   r/o pharyngeal dysphagia  -AC       Functional Impact no impact on function  -AV risk of aspiration/pneumonia;risk of malnutrition;risk of dehydration  -AC       Rehab Potential/Prognosis, Swallowing adequate, monitor progress closely  -AV good, to achieve stated therapy goals  -AC       Criteria for Skilled Therapeutic Interventions Met current level of function same as previous level of function  -AV demonstrates skilled criteria  -AC          Recommendations    SLP Diet Recommendation regular textures;thin liquids  -AV regular textures;thin liquids;other (see comments)   consider NPO if s/s asp noted prior to instrumental  -AC       Recommended Diagnostics  -- FEES  -AC       Recommended Precautions and Strategies  -- upright posture during/after eating;small bites of food and sips of liquid;other (see comments)   general aspiration precautions & oral care BID & PRN  -AC       SLP Rec. for Method of Medication Administration meds whole;with thin liquids;with pudding or applesauce  -AV meds whole;meds crushed;with pudding or applesauce;as  tolerated  -AC       Monitor for Signs of Aspiration  -- yes;notify SLP if any concerns;cough;gurgly voice;throat clearing  -AC       Anticipated Dischage Disposition unknown  -AV unknown  -AC         User Key  (r) = Recorded By, (t) = Taken By, (c) = Cosigned By    Initials Name Effective Dates    AC Marilyn JOLLY MS EDUARD [place]en[place]-SLP 07/27/17 -     AV Blanca Colvin MS CCC-SLP 04/03/18 -         EDUCATION  The patient has been educated in the following areas:   Dysphagia (Swallowing Impairment) Oral Care/Hydration.    SLP Recommendation and Plan  SLP Swallowing Diagnosis: functional oral phase, functional pharyngeal phase  SLP Diet Recommendation: regular textures, thin liquids              Criteria for Skilled Therapeutic Interventions Met: current level of function same as previous level of function  Anticipated Dischage Disposition: unknown  Rehab Potential/Prognosis, Swallowing: adequate, monitor progress closely             Plan of Care Reviewed With: patient, spouse  Plan of Care Review  Plan of Care Reviewed With: patient, spouse  Progress:  (FEES)             Time Calculation:         Time Calculation- SLP     Row Name 06/08/18 1318             Time Calculation- SLP    SLP Start Time 1130  -AV      SLP Received On 06/08/18  -AV        User Key  (r) = Recorded By, (t) = Taken By, (c) = Cosigned By    Initials Name Provider Type    AV Blanca Colvin MS CCC-SLP Speech and Language Pathologist          Therapy Charges for Today     Code Description Service Date Service Provider Modifiers Qty    [id number]  ST FIBEROPTIC ENDO EVAL [facility] 6 6/8/2018 Blanca Colvin MS CCC-SLP GN 1               Blanca Colvin MS CCC-[place]  6/8/2018

## 2018-06-08 NOTE — PLAN OF CARE
Problem: Fall Risk (Adult)  Goal: Identify Related Risk Factors and Signs and Symptoms  Outcome: Ongoing (interventions implemented as appropriate)    Goal: Absence of Fall  Outcome: Ongoing (interventions implemented as appropriate)      Problem: Skin Injury Risk (Adult)  Goal: Identify Related Risk Factors and Signs and Symptoms  Outcome: Ongoing (interventions implemented as appropriate)    Goal: Skin Health and Integrity  Outcome: Ongoing (interventions implemented as appropriate)      Problem: Infection, Risk/Actual (Adult)  Goal: Identify Related Risk Factors and Signs and Symptoms  Outcome: Ongoing (interventions implemented as appropriate)    Goal: Infection Prevention/Resolution  Outcome: Ongoing (interventions implemented as appropriate)      Problem: Patient Care Overview  Goal: Plan of Care Review  Outcome: Ongoing (interventions implemented as appropriate)   06/08/18 0331   Coping/Psychosocial   Plan of Care Reviewed With patient   Plan of Care Review   Progress no change   OTHER   Outcome Summary patient rested during the night. vital signs stable. patient denies pain. patient afibrile this shift.      Goal: Individualization and Mutuality  Outcome: Ongoing (interventions implemented as appropriate)    Goal: Discharge Needs Assessment  Outcome: Ongoing (interventions implemented as appropriate)    Goal: Interprofessional Rounds/Family Conf  Outcome: Ongoing (interventions implemented as appropriate)

## 2018-06-08 NOTE — PLAN OF CARE
Problem: Patient Care Overview  Goal: Plan of Care Review  Outcome: Ongoing (interventions implemented as appropriate)   06/08/18 1314   Coping/Psychosocial   Plan of Care Reviewed With patient;spouse   Plan of Care Review   Progress (FEES)   SLP evaluation completed. Will sign-off dysphagia. Please see note for further details and recommendations.

## 2018-06-08 NOTE — PROGRESS NOTES
Discharge Planning Assessment  [facility]     Patient Name: Eliane Zamora  MRN: [id number]  Today's Date: 6/8/2018    Admit Date: 6/7/2018          Discharge Needs Assessment     Row Name 06/08/18 0432       Living Environment    Lives With spouse    Name(s) of Who Lives With Patient Chuck Zamora, spouse    Current Living Arrangements home/apartment/condo    Primary Care Provided by spouse/significant other    Provides Primary Care For no one    Family Caregiver if Needed child(horacio), adult;spouse    Family Caregiver Names Chuck Zamora, Monika Lane    Quality of Family Relationships helpful;involved;supportive    Able to Return to Prior Arrangements yes       Resource/Environmental Concerns    Resource/Environmental Concerns none       Transition Planning    Patient/Family Anticipates Transition to home    Patient/Family Anticipated Services at Transition none    Transportation Anticipated family or friend will provide       Discharge Needs Assessment    Readmission Within the Last 30 Days no previous admission in last 30 days    Concerns to be Addressed no discharge needs identified;denies needs/concerns at this time    Equipment Currently Used at Home walker, standard;cane, straight;grab bar    Anticipated Changes Related to Illness none    Equipment Needed After Discharge none            Discharge Plan     Row Name 06/08/18 6181       Plan    Plan update    Patient/Family in Agreement with Plan yes    Plan Comments Spoke with patient and  at bedside regarding discharge planning.  Patient denies the use of Home Health and has a [employer] Nurse that comes to see her regularly.  Patient indicates that she has a Standard Walker, Straight Cane and Grab Bars.  Patient indicates that her daughter comes and assist them often and prepares food.  Patient lives with her  in a mulilevel house with 1 step to access but reports that she only accesses the first level.  Patient reports that she uses the walker  when going out but uses the straight cane at home.  Patient denies concerns regarding home safety. CM following for discharge needs.  Patient plan is to discharge home via car with family to transport when medically ready.     Final Discharge Disposition Code 01 - home or self-care        Destination     No service coordination in this encounter.      Durable Medical Equipment     No service coordination in this encounter.      Dialysis/Infusion     No service coordination in this encounter.      Home Medical Care     No service coordination in this encounter.      Social Care     No service coordination in this encounter.        Expected Discharge Date and Time     Expected Discharge Date Expected Discharge Time    Jun 13, 2018               Demographic Summary     Row Name 06/08/18 1335       General Information    Admission Type inpatient    Arrived From home    Referral Source admission list    Reason for Consult discharge planning    Preferred Language English     Used During This Interaction no    General Information Comments Jack Chilel MD       Contact Information    Permission Granted to Share Info With     Contact Information Obtained for     Contact Information Comments Chuck Zamora, spouse  [phone]  Monika Lane, daughter/POA  [phone]            Functional Status     Row Name 06/08/18 1338       Functional Status    Usual Activity Tolerance moderate    Current Activity Tolerance moderate       Functional Status, IADL    Medications assistive equipment    Meal Preparation assistive equipment    Housekeeping assistive equipment    Laundry assistive equipment    Shopping assistive equipment       Employment/    Employment/ Comments Medicare            Psychosocial    No documentation.           Abuse/Neglect    No documentation.           Legal    No documentation.           Substance Abuse    No documentation.           Patient Forms    No  documentation.         Esperanza Roman RN

## 2018-06-08 NOTE — PROGRESS NOTES
[facility] Medicine Services  PROGRESS NOTE    Patient Name: Eliane Zamora  : 1929  MRN: [id number]    Date of Admission: 2018  Length of Stay: 1  Primary Care Physician: Jack Chilel MD    Subjective   Subjective     CC:  F/u fall, PNA    HPI:   present, does most of the talking.  Patient does not voice any complaints.    No fever this AM.  Ate lunch well today.    Review of Systems   Constitutional: Positive for fever.   Respiratory: Positive for cough and shortness of breath.    Cardiovascular: Negative for chest pain.   Gastrointestinal: Negative for abdominal pain.   Neurological: Positive for weakness.   All other systems reviewed and are negative.    Unable to provide due to dementia    Objective   Objective     Vital Signs:   Temp:  [97.8 °F (36.6 °C)-98.9 °F (37.2 °C)] 98.4 °F (36.9 °C)  Heart Rate:  [65-77] 72  Resp:  [16-18] 18  BP: (123-157)/(52-78) 157/78        Physical Exam:  Constitutional: age appropriate, up in chair, No acute distress, awake, alert  HENT: NCAT, mucous membranes moist  Respiratory: some crackles right base, respiratory effort normal   Cardiovascular: RRR (sinus), no murmurs, rubs, or gallops   Gastrointestinal: Positive bowel sounds, soft, nontender, nondistended  Musculoskeletal: No bilateral ankle edema  Psychiatric: Appropriate affect, cooperative  Neurologic: mild confusion, no focal deficits  Skin: No rashes      Results Reviewed:  I have personally reviewed current lab, radiology, and data and agree.      Results from last 7 days  Lab Units 18  0714 18  0751   WBC 10*3/mm3 11.67* 12.34*   HEMOGLOBIN g/dL 10.8* 12.2   HEMATOCRIT % 34.4* 38.1   PLATELETS 10*3/mm3 232 269       Results from last 7 days  Lab Units 18  0714 18  0751   SODIUM mmol/L 140 137   POTASSIUM mmol/L 3.9 3.7   CHLORIDE mmol/L 109 102   CO2 mmol/L 28.0 28.0   BUN mg/dL 13 22   CREATININE mg/dL 1.00 1.23   GLUCOSE mg/dL 81 106*    CALCIUM mg/dL 7.5* 8.1*   ALT (SGPT) U/L  --  23   AST (SGOT) U/L  --  29   TROPONIN I ng/mL  --  0.021     Estimated Creatinine Clearance: 28.2 mL/min (by C-G formula based on SCr of 1 mg/dL).  No results found for: BNP  No results found for: PHART    Microbiology Results Abnormal     Procedure Component Value - Date/Time    Blood Culture - Blood, [[id number]]  (Normal) Collected:  06/07/18 0750    Lab Status:  Preliminary result Specimen:  Blood from Arm, Right Updated:  06/08/18 0815     Blood Culture No growth at 24 hours    Blood Culture - Blood, [[id number]]  (Normal) Collected:  06/07/18 0755    Lab Status:  Preliminary result Specimen:  Blood from Arm, Left Updated:  06/08/18 0815     Blood Culture No growth at 24 hours    Urine Culture - Urine, [[id number]]  (Abnormal) Collected:  06/07/18 0747    Lab Status:  Preliminary result Specimen:  Urine from Urine, Catheter Updated:  06/08/18 0810     Urine Culture >100,000 CFU/mL Gram Negative Bacilli (A)          Imaging Results (last 24 hours)     Procedure Component Value Units Date/Time    Fiberoptic Endo (fees) [[id number]] Resulted:  06/08/18 1321     Updated:  06/08/18 1321    Narrative:       This procedure was auto-finalized with no dictation required.    CT Chest Without Contrast [[id number]] Collected:  06/07/18 1543     Updated:  06/07/18 1613    Narrative:       EXAMINATION: CT CHEST WO CONTRAST- 06/07/2018     INDICATION: A41.9-Sepsis, unspecified organism; J18.9-Pneumonia,  unspecified organism; N39.0-Urinary tract infection, site not specified;  R31.9-Hematuria, unspecified; E86.0-Dehydration         TECHNIQUE: Spiral acquisition 5 mm unenhanced images through the chest  and upper abdomen.     The radiation dose reduction device was turned on for each scan per the  ALARA (As Low as Reasonably Achievable) protocol.     COMPARISON: Chest CT scan 11/20/2013. Portable chest radiograph of  today's date.     FINDINGS: Patient's right mid lung disease, which  appears perihilar in  location on chest radiograph actually appears to represent extensive  airspace disease throughout the superior segment of the right lower  lobe, and posterior right lower lobe, consistent with pneumonia. No  underlying mass is identified although this should be followed to  resolution to exclude this. There is a very small right pleural effusion  perhaps 20 cc. There is a similar sized left effusion, but only mild  adjacent left basilar discoid atelectasis. There is some patchy  interstitial disease in the superior segment of the left lower lobe,  which may also represent pneumonia. Lungs appear clear elsewhere.     Mediastinal window images show several shotty lymph nodes up to 1 cm in  diameter but no clearly pathologic adenopathy. There is no pericardial  effusion.     Included images of the upper abdomen show a couple of near water density  left lobe liver lesions, presumably incidental cysts. Spleen is not  enlarged.  The pancreatic tail, adrenal glands and upper renal poles  appear grossly normal.       Impression:       1. Extensive right lower lobe pneumonia corresponding to patient's chest  x-ray abnormality. No definite underlying mass, but follow-up to  resolution of disease is suggested.  2. Very small bibasilar pleural effusions.  3. Few shotty mediastinal lymph nodes, likely reactive.  4. Probably incidental small left lobe liver cyst.     D:  06/07/2018  E:  06/07/2018        This report was finalized on 6/7/2018 4:11 PM by DR. Andrew Quijano MD.           Results for orders placed during the hospital encounter of 06/07/18   Adult Transthoracic Echo Complete W/ Cont if Necessary Per Protocol    Narrative · Left ventricular systolic function is normal.  · Estimated EF appears to be in the range of 66 - 70%.  · Left atrial cavity size is mildly dilated.          I have reviewed the medications.    Assessment/Plan   Assessment / Plan     Hospital Problem List     * (Principal)Sepsis     UTI (urinary tract infection)    Right lower lobe pneumonia    PAF (paroxysmal atrial fibrillation)    Overview Addendum 11/16/2016 10:33 AM by Josefina Luis     1. Paroxysmal atrial fibrillation:  a. Rhythm control with sotalol.  b. Excessive bradyarrhythmias associated with digoxin.     Her Holter monitor showed essentially sinus rhythm with no excessive bradycardia and no excessive tachycardia.  Her EKG today shows sinus rhythm, corrected QT interval of 418 milliseconds. 05/07/15         Hypothyroidism    Overview Signed 11/16/2016 10:32 AM by Josefina Luis     On chronic supplementation         Hypercholesterolemia    Dementia             Brief Hospital Course to date:  Eliane Zamora is a 88 y.o. female presented to ED after fall during night at home.   unable to get up, so brought to ED.  Found to have temp to 103, UTI, and significant RLL PNA.      Assessment & Plan:  - clinically improved today per .  Reviewed CXR and CT, no evidence of mass on CT, all PNA.  Passed SLP eval.  Will change abx to doxy/rocephin to cover for CAP.  - also evidence if UTI, currently GNR.  Await results, rocephin should cover.  - resume amio and dilt, no AC per last cardiology office note  - mental status is at baseline per .  Reviewed therapy notes, patient is mod assist.   plans to take her home at discharge and is not interested in SNF.  - possible home 1-2 days depending on clinical progress.    DVT Prophylaxis:      CODE STATUS: Full Code    Disposition: I expect the patient to be discharged home in 1-2 days.    Electronically signed by Deuce Mauro MD, 06/08/18, 3:04 PM.

## 2018-06-08 NOTE — THERAPY EVALUATION
Acute Care - Occupational Therapy Initial Evaluation  [facility]     Patient Name: Eliane Zamora  : 1929  MRN: [id number]  Today's Date: 2018  Onset of Illness/Injury or Date of Surgery: 18  Date of Referral to OT: 18  Referring Physician: Andrew    Admit Date: 2018       ICD-10-CM ICD-9-CM   1. Sepsis, due to unspecified organism A41.9 038.9     995.91   2. Pneumonia of right lung due to infectious organism, unspecified part of lung J18.9 483.8   3. Urinary tract infection with hematuria, site unspecified N39.0 599.0    R31.9    4. Dehydration, moderate E86.0 276.51   5. Impaired mobility and ADLs Z74.09 799.89     Patient Active Problem List   Diagnosis   • PAF (paroxysmal atrial fibrillation)   • Hypothyroidism   • Abnormal stress echo   • Osteoporosis   • Hypercholesterolemia   • GERD (gastroesophageal reflux disease)   • Sepsis     Past Medical History:   Diagnosis Date   • Abnormal stress echo     History of abnormal stress echocardiogram with recent echocardiogram showing borderline LV function.    • Dementia    • GERD (gastroesophageal reflux disease)    • Hypercholesterolemia    • Hypothyroidism    • Osteoporosis     /Mild degenerative joint disease   • PAF (paroxysmal atrial fibrillation)    • UTI (urinary tract infection)     Current urinary tract infection by laboratory value, 2013:  Less than 100,000 units/mL of E. coli.     Past Surgical History:   Procedure Laterality Date   • BREAST SURGERY      benign breast biopsy   •  SECTION            OT ASSESSMENT FLOWSHEET (last 72 hours)      Occupational Therapy Evaluation     Row Name 18 1025                   OT Evaluation Time/Intention    Subjective Information no complaints  -TB        Document Type evaluation  -TB        Mode of Treatment occupational therapy;individual therapy  -TB        Patient Effort good  -TB        Comment Pt and spouse (95) with dementia and [place]  -TB           General Information     Patient Profile Reviewed? yes  -TB        Onset of Illness/Injury or Date of Surgery 06/07/18  -TB        Referring Physician Morales  -TB        Patient Observations alert;cooperative  -TB        Patient/Family Observations Pt's  at bedside  -TB        General Observations of Patient Pt rec'vd supine in bed, room air, IV; exit alarms  -TB        Prior Level of Function min assist:;all household mobility;gait;bed mobility;ADL's;dependent:;driving   89 y/o spouse drives  -TB        Equipment Currently Used at Home walker, standard  -TB        Pertinent History of Current Functional Problem Pt to ED s/o fall from bed. Admitted with sepsis from PNA and UTI  -TB        Risks Reviewed patient:;spouse/S.O.:;LOB;increased discomfort;lines disloged  -TB        Benefits Reviewed patient:;spouse/S.O.:;improve function;increase knowledge  -TB        Barriers to Rehab cognitive status  -TB           Relationship/Environment    Primary Source of Support/Comfort spouse  -TB        Lives With spouse  -TB        Family Caregiver if Needed spouse  -TB           Resource/Environmental Concerns    Current Living Arrangements home/apartment/condo  -TB           Home Main Entrance    Number of Stairs, Main Entrance two  -TB           Stairs Within Home, Primary    Stairs Comment, Within Home, Primary Pt does not go upstairs  -TB           Cognitive Assessment/Intervention- PT/OT    Orientation Status (Cognition) oriented to;person;place  -TB        Follows Commands (Cognition) follows one step commands;75-90% accuracy;verbal cues/prompting required;repetition of directions required;physical/tactile prompts required  -TB        Safety Deficit (Cognitive) moderate deficit;insight into deficits/self awareness;awareness of need for assistance  -TB           Safety Issues, Functional Mobility    Safety Issues Affecting Function (Mobility) awareness of need for assistance;insight into deficits/self awareness  -TB        Impairments  Affecting Function (Mobility) cognition;balance;strength  -TB           Bed Mobility Assessment/Treatment    Bed Mobility Assessment/Treatment rolling right;sidelying-sit  -TB        Rolling Right [place] (Bed Mobility) minimum assist (75% patient effort);verbal cues  -TB        Sidelying-Sit [place] (Bed Mobility) moderate assist (50% patient effort);verbal cues  -TB        Bed Mobility, Safety Issues cognitive deficits limit understanding;decreased use of arms for pushing/pulling;decreased use of legs for bridging/pushing  -TB        Assistive Device (Bed Mobility) bed rails;head of bed elevated  -TB        Comment (Bed Mobility) cues to sequence, increased time  -TB           Functional Mobility    Functional Mobility- Ind. Level contact guard assist;verbal cues required  -TB        Functional Mobility- Device standard walker  -TB        Functional Mobility-Distance (Feet) 8  -TB        Functional Mobility- Safety Issues step length decreased;balance decreased during turns  -TB        Functional Mobility- Comment EOB to [facility]  -TB           Transfer Assessment/Treatment    Transfer Assessment/Treatment sit-stand transfer;stand-sit transfer;bed-chair transfer  -TB        Comment (Transfers) cues for hand placement  -TB        Bed-Chair [place] (Transfers) contact guard;verbal cues  -TB        Sit-Stand [place] (Transfers) minimum assist (75% patient effort);verbal cues  -TB        Stand-Sit [place] (Transfers) contact guard;verbal cues  -TB           ADL Assessment/Intervention    BADL Assessment/Intervention upper body dressing;grooming;lower body dressing  -TB           Upper Body Dressing Assessment/Training    Upper Body Dressing [place] Level doff;don;pajama/robe  -TB        Upper Body Dressing Position edge of bed sitting  -TB        Comment (Upper Body Dressing) assist to doff soiled gown (food) and don clean; assist for multiple lines  -TB           Lower Body Dressing  Assessment/Training    Lower Body Dressing [place] Level don;socks  -TB        Comment (Lower Body Dressing) spouse completed  -TB           Grooming Assessment/Training    Branch Level (Grooming) set up;hair care, combing/brushing;verbal cues  -TB        Grooming Position supported sitting  -TB           General ROM    GENERAL ROM COMMENTS h/o fall with L shoulder fracture - residual pain and ROM deficits FE/ABduction; R UE WFL for ADL  -TB           General Assessment (Manual Muscle Testing)    General Manual Muscle Testing (MMT) Assessment upper extremity strength deficits identified;lower extremity strength deficits identified  -TB           Upper Extremity (Manual Muscle Testing)    Comment, MMT: Upper Extremity 2+/5 L UE, 3+/5 R UE  -TB           Lower Extremity (Manual Muscle Testing)    Comment, MMT: Lower Extremity 4-/5  -TB           Motor Assessment/Interventions    Additional Documentation Balance (Group)  -TB           Balance    Balance static sitting balance;static standing balance;dynamic standing balance  -TB           Static Sitting Balance    Level of Branch (Unsupported Sitting, Static Balance) supervision  -TB           Static Standing Balance    Level of Branch (Supported Standing, Static Balance) contact guard assist  -TB           Dynamic Standing Balance    Level of Branch, Reaches Outside Midline (Standing, Dynamic Balance) minimal assist, 75% patient effort  -TB           Sensory Assessment/Intervention    Sensory General Assessment no sensation deficits identified  -TB           Positioning and Restraints    Pre-Treatment Position in bed  -TB        Post Treatment Position chair  -TB           Pain Assessment    Additional Documentation Pain Scale: FACES Pre/Post-Treatment (Group)  -TB           Pain Scale: Numbers Pre/Post-Treatment    Pain Location - Side Left  -TB        Pain Location hand   pain at IV site  -TB           Pain Scale: FACES  Pre/Post-Treatment    Pain: FACES Scale, Pretreatment 2-->hurts little bit  -TB        Pain: FACES Scale, Post-Treatment 0-->no hurt  -TB           Coping    Observed Emotional State calm;cooperative  -TB        Verbalized Emotional State acceptance  -TB           Plan of Care Review    Plan of Care Reviewed With patient;spouse  -TB           Clinical Impression (OT)    Date of Referral to OT 06/07/18  -TB        OT Diagnosis Impaired mobility and ADL  -TB        Patient/Family Goals Statement (OT Eval) to return home together at d/c  -TB        Criteria for Skilled Therapeutic Interventions Met (OT Eval) yes;treatment indicated  -TB        Rehab Potential (OT Eval) fair, will monitor progress closely  -TB        Therapy Frequency (OT Eval) daily  -TB        Care Plan Review (OT) evaluation/treatment results reviewed;patient/other agree to care plan  -TB        Care Plan Review, Other Participant (OT Eval) spouse  -TB        Anticipated Equipment Needs at Discharge (OT) shower chair;two wheeled walker  -TB        Anticipated Discharge Disposition (OT) home with assist;home with home health  -TB           Vital Signs    Pre Systolic BP Rehab --   RN cleared OT  -TB        O2 Delivery Post Treatment room air  -TB        Pre Patient Position Supine  -TB        Intra Patient Position Standing  -TB        Post Patient Position Sitting  -TB           Planned OT Interventions    Planned Therapy Interventions (OT Eval) transfer/mobility retraining;occupation/activity based interventions  -TB           OT Goals    Transfer Goal Selection (OT) transfer, OT goal 1  -TB        Bathing Goal Selection (OT) bathing, OT goal 1  -TB        Functional Mobility Goal Selection (OT) functional mobility, OT goal 1  -TB           Transfer Goal 1 (OT)    Activity/Assistive Device (Transfer Goal 1, OT) sit-to-stand/stand-to-sit;toilet;walker, rolling   to BR for transfer to support household distances  -TB        [place] Level/Cues  Needed (Transfer Goal 1, OT) minimum assist (75% or more patient effort)  -TB        Time Frame (Transfer Goal 1, OT) by discharge  -TB        Barriers (Transfers Goal 1, OT) cognition, strength, balance  -TB           Bathing Goal 1 (OT)    Activity/Assistive Device (Bathing Goal 1, OT) lower body bathing;long-handled sponge;shower chair  -TB        [place] Level/Cues Needed (Bathing Goal 1, OT) minimum assist (75% or more patient effort)  -TB        Time Frame (Bathing Goal 1, OT) by discharge  -TB        Barriers (Bathing Goal 1, OT) cognition, strength, balance  -TB           Functional Mobility Goal 1 (OT)    Activity/Assistive Device (Functional Mobility Goal 1, OT) walker, rolling  -TB        [place] Level/Cues Needed (Functional Mobility Goal 1, OT) contact guard assist;verbal cues required  -TB        Distance Goal 1 (Functional Mobility, OT) to BR  -TB        Time Frame (Functional Mobility Goal 1, OT) by discharge  -TB        Barriers (Functional Mobility Goal 1, OT) cognition, strength, balance  -TB           Living Environment    Home Accessibility stairs to enter home;stairs within home   walk-in shower  -TB          User Key  (r) = Recorded By, (t) = Taken By, (c) = Cosigned By    Initials Name Effective Dates    TB Brigitte Witt, OT 06/22/15 -            Occupational Therapy Education     Title: PT OT SLP Therapies (Done)     Topic: Occupational Therapy (Done)     Point: ADL training (Done)     Description: Instruct learner(s) on proper safety adaptation and remediation techniques during self care or transfers.   Instruct in proper use of assistive devices.   Learning Progress Summary     Learner Status Readiness Method Response Comment Documented by    Patient Done Acceptance E VU,NR Education initiated for benefits of OOB activity/therapy, role of OT and recommendation for RW and shower chair for home. TB 06/08/18 1118                      User Key     Initials Effective Dates  Name Provider Type Discipline     06/22/15 -  Brigitte Witt OT Occupational Therapist OT                  OT Recommendation and Plan  Outcome Summary/Treatment Plan (OT)  Anticipated Equipment Needs at Discharge (OT): shower chair, two wheeled walker  Anticipated Discharge Disposition (OT): home with assist, home with home health  Planned Therapy Interventions (OT Eval): transfer/mobility retraining, occupation/activity based interventions  Therapy Frequency (OT Eval): daily  Plan of Care Review  Plan of Care Reviewed With: patient, spouse  Plan of Care Reviewed With: patient, spouse  Outcome Summary: OT IE completed. Pt Ox[place] and [place]. Presents with generalized weakness, impaired balance and h/o falls.  Mod A bed mobility. Min A STS. CGA fx'l mobility. Min A simple ADLs. Pt's 89 y/o Spouse assists with bathing/dressing at baseline. OT to follow.  Pt would benefit from SNF level rehab for best outcome at d/c; pt/spouse want to return home.             Outcome Measures     Row Name 06/08/18 1025             How much help from another is currently needed...    Putting on and taking off regular lower body clothing? 2  -TB      Bathing (including washing, rinsing, and drying) 2  -TB      Toileting (which includes using toilet bed pan or urinal) 2  -TB      Putting on and taking off regular upper body clothing 2  -TB      Taking care of personal grooming (such as brushing teeth) 3  -TB      Eating meals 3  -TB      Score 14  -TB         Functional Assessment    Outcome Measure Options AM-PAC 6 Clicks Daily Activity (OT)  -TB        User Key  (r) = Recorded By, (t) = Taken By, (c) = Cosigned By    Initials Name Provider Type    TB Brigitte Witt OT Occupational Therapist          Time Calculation:   OT Start Time: 1025    Therapy Charges for Today     Code Description Service Date Service Provider Modifiers Qty    [id number]  OT EVAL MOD COMPLEXITY 4 6/8/2018 Brigitte Witt OT GO 1                Brigitte Witt, OT  6/8/2018

## 2018-06-08 NOTE — PROGRESS NOTES
"Adult Nutrition  Assessment/PES    Patient Name:  Eliane Zamora  YOB: 1929  MRN: [id number]  Admit Date:  6/7/2018    Assessment Date:  6/8/2018    Comments:            Reason for Assessment     Row Name 06/08/18 1255          Reason for Assessment    Reason For Assessment physician consult   30 mins     Diagnosis --   s/p fall. Sepsis, UTI, PNA; h/o dementia, GERD.             Nutrition/Diet History     Row Name 06/08/18 1300          Nutrition/Diet History    Factors Affecting Nutritional Intake --   pt states she liked the food today at lunch. Pt/ state pt has had no changes in appetite or po intake during past month prior to adm, and  states pt is \"holding her own.\"              Anthropometrics     Row Name 06/08/18 1303 06/08/18 1256       Anthropometrics    Weight  -- --   ht= 60in, tz=990fx (estimated, per nsg documentation); BMI= 19.5. RD requested that RN obtain actual wt.       Usual Body Weight (UBW)    Weight Loss --    states pt was weighed at Dr. Chilel's office sometime \"recently\" but does not remember what pt's wt was.  states he does not know of any pt wt loss.   Pt/  are not  able to give info r/t pt's UBW.  --            Labs/Tests/Procedures/Meds     Row Name 06/08/18 1259          Labs/Procedures/Meds    Lab Results Reviewed reviewed     Lab Results Comments --   Noted results of 6/7 SLP eval and plans for FEES. FEES results pending.                 Nutrition Prescription Ordered     Row Name 06/08/18 1259          Nutrition Prescription PO    Current PO Diet Regular     Common Modifiers Cardiac             Evaluation of Received Nutrient/Fluid Intake     Row Name 06/08/18 1301          PO Evaluation    Number of Days PO Intake Evaluated Insufficient Data     Number of Meals 2     % PO Intake 25               Malnutrition Severity Assessment     Row Name 06/08/18 1306          Malnutrition Severity Assessment    Malnutrition Type --   not " able to complete malnutrition severity assessment at this time d/t lack of data.         Problem/Interventions: No nutrition dx at this time; need to obtain more data.                  Intervention Goal     Row Name 06/08/18 1302          Intervention Goal    PO Establish PO             Nutrition Intervention     Row Name 06/08/18 1302          Nutrition Intervention    RD/Tech Action Follow Tx progress;Supplement provided;Interview for preference;Encourage intake   RD ordered Boost plus 2x/d; pt willing to accept.               Education/Evaluation     Row Name 06/08/18 1302          Monitor/Evaluation    Monitor Per protocol         Electronically signed by:  Bria Aguayo MS,RD,LD  06/08/18 1:06 PM

## 2018-06-09 LAB
ANION GAP SERPL CALCULATED.3IONS-SCNC: 6 MMOL/L (ref 3–11)
BACTERIA SPEC AEROBE CULT: ABNORMAL
BUN BLD-MCNC: 12 MG/DL (ref 9–23)
BUN/CREAT SERPL: 13.8 (ref 7–25)
CALCIUM SPEC-SCNC: 7.3 MG/DL (ref 8.7–10.4)
CHLORIDE SERPL-SCNC: 105 MMOL/L (ref 99–109)
CO2 SERPL-SCNC: 26 MMOL/L (ref 20–31)
CREAT BLD-MCNC: 0.87 MG/DL (ref 0.6–1.3)
GFR SERPL CREATININE-BSD FRML MDRD: 61 ML/MIN/1.73
GLUCOSE BLD-MCNC: 83 MG/DL (ref 70–100)
POTASSIUM BLD-SCNC: 3.8 MMOL/L (ref 3.5–5.5)
S PNEUM AG SPEC QL LA: NEGATIVE
SODIUM BLD-SCNC: 137 MMOL/L (ref 132–146)

## 2018-06-09 PROCEDURE — 25010000002 CEFEPIME: Performed by: INTERNAL MEDICINE

## 2018-06-09 PROCEDURE — 80048 BASIC METABOLIC PNL TOTAL CA: CPT

## 2018-06-09 PROCEDURE — 25010000002 ENOXAPARIN PER 10 MG: Performed by: FAMILY MEDICINE

## 2018-06-09 PROCEDURE — 97161 PT EVAL LOW COMPLEX 20 MIN: CPT | Performed by: PHYSICAL THERAPIST

## 2018-06-09 PROCEDURE — 99233 SBSQ HOSP IP/OBS HIGH 50: CPT | Performed by: INTERNAL MEDICINE

## 2018-06-09 RX ADMIN — DOXYCYCLINE 100 MG: 100 INJECTION, POWDER, LYOPHILIZED, FOR SOLUTION INTRAVENOUS at 17:41

## 2018-06-09 RX ADMIN — LEVOTHYROXINE SODIUM 75 MCG: 75 TABLET ORAL at 08:32

## 2018-06-09 RX ADMIN — DOXYCYCLINE 100 MG: 100 CAPSULE ORAL at 08:32

## 2018-06-09 RX ADMIN — CEFEPIME 2 G: 2 INJECTION, POWDER, FOR SOLUTION INTRAVENOUS at 15:47

## 2018-06-09 RX ADMIN — Medication 250 MG: at 19:43

## 2018-06-09 RX ADMIN — PANTOPRAZOLE SODIUM 40 MG: 40 TABLET, DELAYED RELEASE ORAL at 08:32

## 2018-06-09 RX ADMIN — Medication 250 MG: at 08:32

## 2018-06-09 RX ADMIN — AMIODARONE HYDROCHLORIDE 200 MG: 200 TABLET ORAL at 08:32

## 2018-06-09 RX ADMIN — DILTIAZEM HYDROCHLORIDE 120 MG: 120 CAPSULE, EXTENDED RELEASE ORAL at 08:32

## 2018-06-09 NOTE — PLAN OF CARE
Problem: Patient Care Overview  Goal: Plan of Care Review  Outcome: Ongoing (interventions implemented as appropriate)   06/09/18 8007   Coping/Psychosocial   Plan of Care Reviewed With spouse;patient   OTHER   Outcome Summary PT evaluation completed on this date. Pt transferred sit<-->stand with CGA and ambulated 100 feet using std walker with CGA. Pt very confused throughout session. Skilled PT services warranted to improve mobility and safety prior to d/c. Recommend d/c to SNF.

## 2018-06-09 NOTE — PROGRESS NOTES
[facility] Medicine Services  PROGRESS NOTE    Patient Name: Eliane Zamora  : 1929  MRN: [id number]    Date of Admission: 2018  Length of Stay: 2  Primary Care Physician: Jack Chilel MD    Subjective   Subjective     CC:  F/u fall, PNA    HPI:  Daughter and  present.  Patient is confused, but does not voice any concerns.   says she didn't eat as well this morning.  Afebrile overnight.      Review of Systems   Constitutional: Positive for fever.   Respiratory: Positive for cough and shortness of breath.    Cardiovascular: Negative for chest pain.   Gastrointestinal: Negative for abdominal pain.   Neurological: Positive for weakness.   All other systems reviewed and are negative.    Unable to provide due to dementia    Objective   Objective     Vital Signs:   Temp:  [97.5 °F (36.4 °C)-98.5 °F (36.9 °C)] 98.5 °F (36.9 °C)  Heart Rate:  [64-80] 66  Resp:  [16-18] 18  BP: (122-162)/(56-81) 124/65        Physical Exam:  Constitutional: age appropriate, up in chair, No acute distress, awake, alert  HENT: NCAT, mucous membranes moist  Respiratory: some crackles right base, respiratory effort normal   Cardiovascular: RRR (sinus), no murmurs, rubs, or gallops   Gastrointestinal: Positive bowel sounds, soft, nontender, nondistended  Musculoskeletal: No bilateral ankle edema  Psychiatric: Appropriate affect, cooperative  Neurologic: mild confusion, no focal deficits  Skin: No rashes  Exam unchanged from previous      Results Reviewed:  I have personally reviewed current lab, radiology, and data and agree.      Results from last 7 days  Lab Units 18  0714 18  0751   WBC 10*3/mm3 11.67* 12.34*   HEMOGLOBIN g/dL 10.8* 12.2   HEMATOCRIT % 34.4* 38.1   PLATELETS 10*3/mm3 232 269       Results from last 7 days  Lab Units 18  0635 18  0714 18  0751   SODIUM mmol/L 137 140 137   POTASSIUM mmol/L 3.8 3.9 3.7   CHLORIDE mmol/L 105 109 102   CO2  mmol/L 26.0 28.0 28.0   BUN mg/dL 12 13 22   CREATININE mg/dL 0.87 1.00 1.23   GLUCOSE mg/dL 83 81 106*   CALCIUM mg/dL 7.3* 7.5* 8.1*   ALT (SGPT) U/L  --   --  23   AST (SGOT) U/L  --   --  29   TROPONIN I ng/mL  --   --  0.021     Estimated Creatinine Clearance: 32.5 mL/min (by C-G formula based on SCr of 0.87 mg/dL).  No results found for: BNP  No results found for: PHART    Microbiology Results Abnormal     Procedure Component Value - Date/Time    Urine Culture - Urine, [[id number]]  (Abnormal)  (Susceptibility) Collected:  06/07/18 0747    Lab Status:  Final result Specimen:  Urine from Urine, Catheter Updated:  06/09/18 1109     Urine Culture >100,000 CFU/mL Acinetobacter baumannii complex / haemolyticus (A)    Susceptibility      Acinetobacter baumannii complex / haemolyticus     NIHARIKA     Ampicillin + Sulbactam >16/8 ug/ml Resistant     Cefepime <=8 ug/ml Susceptible     Cefotaxime 16 ug/ml Intermediate     Ceftazidime 4 ug/ml Susceptible     Ceftriaxone >32 ug/ml Resistant     Gentamicin <=4 ug/ml Susceptible     Levofloxacin <=2 ug/ml Susceptible     Meropenem <=1 ug/ml Susceptible     Tetracycline >8 ug/ml Resistant     Tobramycin <=4 ug/ml Susceptible     Trimethoprim + Sulfamethoxazole >2/38 ug/ml Resistant                    Blood Culture - Blood, [[id number]]  (Normal) Collected:  06/07/18 0750    Lab Status:  Preliminary result Specimen:  Blood from Arm, Right Updated:  06/09/18 0815     Blood Culture No growth at 2 days    Blood Culture - Blood, [[id number]]  (Normal) Collected:  06/07/18 0755    Lab Status:  Preliminary result Specimen:  Blood from Arm, Left Updated:  06/09/18 0815     Blood Culture No growth at 2 days          Imaging Results (last 24 hours)     ** No results found for the last 24 hours. **        Results for orders placed during the hospital encounter of 06/07/18   Adult Transthoracic Echo Complete W/ Cont if Necessary Per Protocol    Narrative · Left ventricular systolic function is  normal.  · Estimated EF appears to be in the range of 66 - 70%.  · Left atrial cavity size is mildly dilated.          I have reviewed the medications.    Assessment/Plan   Assessment / Plan     Hospital Problem List     * (Principal)Sepsis    UTI (urinary tract infection)    Right lower lobe pneumonia    PAF (paroxysmal atrial fibrillation)    Overview Addendum 11/16/2016 10:33 AM by Josefina Luis     1. Paroxysmal atrial fibrillation:  a. Rhythm control with sotalol.  b. Excessive bradyarrhythmias associated with digoxin.     Her Holter monitor showed essentially sinus rhythm with no excessive bradycardia and no excessive tachycardia.  Her EKG today shows sinus rhythm, corrected QT interval of 418 milliseconds. 05/07/15         Hypothyroidism    Overview Signed 11/16/2016 10:32 AM by Josefina Luis     On chronic supplementation         Hypercholesterolemia    Dementia             Brief Hospital Course to date:  Eliane Zamora is a 88 y.o. female presented to ED after fall during night at home.   unable to get up, so brought to ED.  Found to have temp to 103, UTI, and significant RLL PNA.      Assessment & Plan:  - Remains clinically improved from admission.    Reviewed CXR and CT, no evidence of mass on CT, all PNA.  Passed SLP eval.  Given resistant UTI will change to rocephin to cefipime for CAP coverage, continue doxy  - also evidence if UTI, culture growing some resistant acinetobacter.  Change abx to cefepime.  - resume amio and dilt, no AC per last cardiology office note  - mental status is at baseline per .  Reviewed therapy notes, patient is mod assist.   - d/w family today and they are now agreeable to SNF for some rehab.  Will notify CM to make referrals.  Plan transfer early in week hopefully.    DVT Prophylaxis:      CODE STATUS: Full Code    Disposition: I expect the patient to be discharged SNF ~3 days.    Electronically signed by Deuce Mauro MD, 06/09/18, 2:31 PM.

## 2018-06-09 NOTE — PLAN OF CARE
Problem: Fall Risk (Adult)  Goal: Identify Related Risk Factors and Signs and Symptoms  Outcome: Ongoing (interventions implemented as appropriate)    Goal: Absence of Fall  Outcome: Ongoing (interventions implemented as appropriate)      Problem: Skin Injury Risk (Adult)  Goal: Identify Related Risk Factors and Signs and Symptoms  Outcome: Ongoing (interventions implemented as appropriate)    Goal: Skin Health and Integrity  Outcome: Ongoing (interventions implemented as appropriate)      Problem: Infection, Risk/Actual (Adult)  Goal: Identify Related Risk Factors and Signs and Symptoms  Outcome: Ongoing (interventions implemented as appropriate)    Goal: Infection Prevention/Resolution  Outcome: Ongoing (interventions implemented as appropriate)      Problem: Patient Care Overview  Goal: Plan of Care Review  Outcome: Ongoing (interventions implemented as appropriate)   06/09/18 0316   Coping/Psychosocial   Plan of Care Reviewed With patient   Plan of Care Review   Progress no change   OTHER   Outcome Summary rested during the night. vital signs stable. no complaint this shift. no reports of SOA      Goal: Individualization and Mutuality  Outcome: Ongoing (interventions implemented as appropriate)    Goal: Discharge Needs Assessment  Outcome: Ongoing (interventions implemented as appropriate)    Goal: Interprofessional Rounds/Family Conf  Outcome: Ongoing (interventions implemented as appropriate)

## 2018-06-09 NOTE — PLAN OF CARE
Problem: Fall Risk (Adult)  Goal: Identify Related Risk Factors and Signs and Symptoms  Outcome: Ongoing (interventions implemented as appropriate)    Goal: Absence of Fall  Outcome: Ongoing (interventions implemented as appropriate)      Problem: Skin Injury Risk (Adult)  Goal: Identify Related Risk Factors and Signs and Symptoms  Outcome: Ongoing (interventions implemented as appropriate)    Goal: Skin Health and Integrity  Outcome: Ongoing (interventions implemented as appropriate)      Problem: Infection, Risk/Actual (Adult)  Goal: Identify Related Risk Factors and Signs and Symptoms  Outcome: Ongoing (interventions implemented as appropriate)    Goal: Infection Prevention/Resolution  Outcome: Ongoing (interventions implemented as appropriate)      Problem: Patient Care Overview  Goal: Plan of Care Review  Outcome: Ongoing (interventions implemented as appropriate)    Goal: Individualization and Mutuality  Outcome: Ongoing (interventions implemented as appropriate)    Goal: Discharge Needs Assessment  Outcome: Ongoing (interventions implemented as appropriate)    Goal: Interprofessional Rounds/Family Conf  Outcome: Ongoing (interventions implemented as appropriate)

## 2018-06-09 NOTE — THERAPY EVALUATION
Acute Care - Physical Therapy Initial Evaluation  [facility]     Patient Name: Eliane Zamora  : 1929  MRN: [id number]  Today's Date: 2018   Onset of Illness/Injury or Date of Surgery: 18  Date of Referral to PT: 18  Referring Physician: Barry Morales MD      Admit Date: 2018    Visit Dx:     ICD-10-CM ICD-9-CM   1. Sepsis, due to unspecified organism A41.9 038.9     995.91   2. Pneumonia of right lung due to infectious organism, unspecified part of lung J18.9 483.8   3. Urinary tract infection with hematuria, site unspecified N39.0 599.0    R31.9    4. Dehydration, moderate E86.0 276.51   5. Impaired mobility and ADLs Z74.09 799.89   6. Impaired functional mobility, balance, gait, and endurance Z74.09 V49.89     Patient Active Problem List   Diagnosis   • PAF (paroxysmal atrial fibrillation)   • Hypothyroidism   • Abnormal stress echo   • Osteoporosis   • Hypercholesterolemia   • GERD (gastroesophageal reflux disease)   • Sepsis   • UTI (urinary tract infection)   • Right lower lobe pneumonia   • Dementia     Past Medical History:   Diagnosis Date   • Abnormal stress echo     History of abnormal stress echocardiogram with recent echocardiogram showing borderline LV function.    • Dementia    • GERD (gastroesophageal reflux disease)    • Hypercholesterolemia    • Hypothyroidism    • Osteoporosis     /Mild degenerative joint disease   • PAF (paroxysmal atrial fibrillation)    • UTI (urinary tract infection)     Current urinary tract infection by laboratory value, 2013:  Less than 100,000 units/mL of E. coli.     Past Surgical History:   Procedure Laterality Date   • BREAST SURGERY      benign breast biopsy   •  SECTION          PT ASSESSMENT (last 12 hours)      Physical Therapy Evaluation     Row Name 18 1327          PT Evaluation Time/Intention    Subjective Information no complaints  -LM     Document Type evaluation  -LM     Mode of Treatment individual  therapy;physical therapy  -LM     Patient Effort good  -LM     Symptoms Noted During/After Treatment none  -LM     Row Name 06/09/18 1327          General Information    Patient Profile Reviewed? yes  -LM     Onset of Illness/Injury or Date of Surgery 06/07/18  -LM     Referring Physician Barry Morales MD  -LM     General Observations of Patient Pt sitting up in chair.   present.  Both very pleasant and agreeable to PT eval.  -LM     Prior Level of Function independent:;all household mobility;gait;ADL's   Per pt - unsure of accuracy due to dementia  -LM     Equipment Currently Used at Home walker, standard;cane, straight   Uses std walker at home  -LM     Existing Precautions/Restrictions fall;other (see comments)   Dementia  -LM     Risks Reviewed patient:;spouse/S.O.:;LOB;increased discomfort  -LM     Benefits Reviewed patient:;spouse/S.O.:;improve function;increase independence;increase strength;increase balance  -LM     Barriers to Rehab cognitive status  -LM     Row Name 06/09/18 1327          Relationship/Environment    Lives With spouse  -LM     Row Name 06/09/18 1327          Resource/Environmental Concerns    Current Living Arrangements home/apartment/condo  -     Row Name 06/09/18 1327          Home Main Entrance    Number of Stairs, Main Entrance two  -LM     Stair Railings, Main Entrance none  -LM     Row Name 06/09/18 1327          Cognitive Assessment/Interventions    Additional Documentation Cognitive Assessment/Intervention (Group)  -LM     Row Name 06/09/18 1327          Cognitive Assessment/Intervention- PT/OT    Affect/Mental Status (Cognitive) confused  -LM     Orientation Status (Cognition) oriented to;person;disoriented to;place;situation;time  -LM     Follows Commands (Cognition) follows one step commands;75-90% accuracy;verbal cues/prompting required  -LM     Cognitive Function (Cognitive) memory deficit;safety deficit  -LM     Safety Deficit (Cognitive) moderate deficit;safety  precautions awareness  -     Personal Safety Interventions fall prevention program maintained;gait belt;muscle strengthening facilitated;nonskid shoes/slippers when out of bed  -LM     Row Name 06/09/18 1327          Bed Mobility Assessment/Treatment    Comment (Bed Mobility) Up in chair  -LM     Row Name 06/09/18 1327          Transfer Assessment/Treatment    Transfer Assessment/Treatment sit-stand transfer;stand-sit transfer  -LM     Sit-Stand [place] (Transfers) contact guard;verbal cues  -LM     Stand-Sit [place] (Transfers) contact guard;verbal cues  -LM     Row Name 06/09/18 1327          Sit-Stand Transfer    Assistive Device (Sit-Stand Transfers) walker, standard  -LM     Row Name 06/09/18 1327          Stand-Sit Transfer    Assistive Device (Stand-Sit Transfers) walker, standard  -LM     Row Name 06/09/18 1327          Gait/Stairs Assessment/Training    Gait/Stairs Assessment/Training gait/ambulation independence;gait/ambulation assistive device  -LM     [place] Level (Gait) contact guard  -LM     Assistive Device (Gait) walker, standard  -     Distance in Feet (Gait) 100 feet  -LM     Deviations/Abnormal Patterns (Gait) sofia decreased;base of support, narrow  -LM     Comment (Gait/Stairs) Educated on making sure walker is planted on the floor prior to taking a step.  -LM     Row Name 06/09/18 1327          General ROM    GENERAL ROM COMMENTS BLE AROM WFL  -     Row Name 06/09/18 1327          MMT (Manual Muscle Testing)    Additional Documentation General Assessment (Manual Muscle Testing) (Group)  -     Row Name 06/09/18 1327          General Assessment (Manual Muscle Testing)    General Manual Muscle Testing (MMT) Assessment lower extremity strength deficits identified  -     Row Name 06/09/18 1327          Lower Extremity (Manual Muscle Testing)    Comment, MMT: Lower Extremity BLEs grossly 4/5 throughout  -     Row Name 06/09/18 1327          Sensory  Assessment/Intervention    Sensory General Assessment no sensation deficits identified  -LM     Row Name 06/09/18 1327          Vision Assessment/Intervention    Visual Impairment/Limitations corrective lenses full time  -LM     Row Name 06/09/18 1327          Pain Assessment    Additional Documentation Pain Scale: Numbers Pre/Post-Treatment (Group)  -LM     Row Name 06/09/18 1327          Pain Scale: Numbers Pre/Post-Treatment    Pain Scale: Numbers, Pretreatment 0/10 - no pain  -LM     Pain Scale: Numbers, Post-Treatment 0/10 - no pain  -LM     Row Name 06/09/18 1327          Physical Therapy Clinical Impression    Date of Referral to PT 06/07/18  -     PT Diagnosis (PT Clinical Impression) Impaired functional mobility, balance, gait, and endurance  -     Criteria for Skilled Interventions Met (PT Clinical Impression) yes;treatment indicated  -     Rehab Potential (PT Clinical Summary) fair, will monitor progress closely  -     Care Plan Review (PT) evaluation/treatment results reviewed;care plan/treatment goals reviewed;risks/benefits reviewed;current/potential barriers reviewed;patient/other agree to care plan  -     Row Name 06/09/18 1327          Vital Signs    Pre Systolic BP Rehab 124  -LM     Pre Treatment Diastolic BP 65  -LM     Pretreatment Heart Rate (beats/min) 72  -LM     Posttreatment Heart Rate (beats/min) 83  -LM     Pre SpO2 (%) 95  -LM     O2 Delivery Pre Treatment room air  -LM     Post SpO2 (%) 95  -LM     O2 Delivery Post Treatment room air  -LM     Pre Patient Position Sitting  -LM     Intra Patient Position Standing  -LM     Post Patient Position Sitting  -LM     Row Name 06/09/18 1327          Physical Therapy Goals    Transfer Goal Selection (PT) transfer, PT goal 1  -LM     Gait Training Goal Selection (PT) gait training, PT goal 1  -LM     Stairs Goal Selection (PT) stairs, PT goal 1  -LM     Additional Documentation Stairs Goal Selection (PT) (Row)  -     Row Name 06/09/18  1327          Transfer Goal 1 (PT)    Activity/Assistive Device (Transfer Goal 1, PT) bed-to-chair/chair-to-bed;walker, standard  -LM     [place] Level/Cues Needed (Transfer Goal 1, PT) supervision required  -LM     Time Frame (Transfer Goal 1, PT) long term goal (LTG);2 weeks  -LM     Row Name 06/09/18 1327          Gait Training Goal 1 (PT)    Activity/Assistive Device (Gait Training Goal 1, PT) gait (walking locomotion);assistive device use  -LM     [place] Level (Gait Training Goal 1, PT) standby assist  -LM     Distance (Gait Goal 1, PT) 150 feet  -LM     Time Frame (Gait Training Goal 1, PT) long term goal (LTG);2 weeks  -LM     Row Name 06/09/18 1327          Stairs Goal 1 (PT)    Activity/Assistive Device (Stairs Goal 1, PT) ascending stairs;descending stairs  -LM     [place] Level/Cues Needed (Stairs Goal 1, PT) contact guard assist  -LM     Number of Stairs (Stairs Goal 1, PT) 2 stairs  -LM     Time Frame (Stairs Goal 1, PT) long term goal (LTG);2 weeks  -LM     Row Name 06/09/18 1327          Positioning and Restraints    Pre-Treatment Position sitting in chair/recliner  -LM     Post Treatment Position chair  -LM     In Chair reclined;call light within reach;encouraged to call for assist;exit alarm on;with family/caregiver;notified nsg  -LM     Row Name 06/09/18 1327          Living Environment    Home Accessibility stairs to enter home  -LM       User Key  (r) = Recorded By, (t) = Taken By, (c) = Cosigned By    Initials Name Provider Type    LM Jaz Arizmendi PT Physical Therapist          Physical Therapy Education     Title: PT OT SLP Therapies (Active)     Topic: Physical Therapy (Active)     Point: Mobility training (Active)    Learning Progress Summary     Learner Status Readiness Method Response Comment Documented by    Patient Active Acceptance E NR  LM 06/09/18 1357    Family Active Acceptance E NR  LM 06/09/18 1357          Point: Precautions (Active)    Learning Progress  Summary     Learner Status Readiness Method Response Comment Documented by    Patient Active Acceptance E NR  LM 06/09/18 1357    Family Active Acceptance E NR  LM 06/09/18 1357                      User Key     Initials Effective Dates Name Provider Type Discipline     06/15/16 -  Jaz Arizmendi, PT Physical Therapist PT                PT Recommendation and Plan  Anticipated Discharge Disposition (PT): skilled nursing facility  Planned Therapy Interventions (PT Eval): balance training, bed mobility training, gait training, home exercise program, neuromuscular re-education, patient/family education, postural re-education, ROM (range of motion), stair training, strengthening, stretching, transfer training  Therapy Frequency (PT Clinical Impression): daily  Outcome Summary/Treatment Plan (PT)  Anticipated Discharge Disposition (PT): skilled nursing facility  Plan of Care Reviewed With: spouse, patient  Outcome Summary: PT evaluation completed on this date.  Pt transferred sit<-->stand with CGA and ambulated 100 feet using std walker with CGA.  Pt very confused throughout session.  Skilled PT services warranted to improve mobility and safety prior to d/c.  Recommend d/c to SNF.          Outcome Measures     Row Name 06/09/18 1327 06/08/18 1025          How much help from another person do you currently need...    Turning from your back to your side while in flat bed without using bedrails? 3  -LM  --     Moving from lying on back to sitting on the side of a flat bed without bedrails? 3  -LM  --     Moving to and from a bed to a chair (including a wheelchair)? 3  -LM  --     Standing up from a chair using your arms (e.g., wheelchair, bedside chair)? 3  -LM  --     Climbing 3-5 steps with a railing? 3  -LM  --     To walk in hospital room? 3  -LM  --     AM-PAC 6 Clicks Score 18  -LM  --        How much help from another is currently needed...    Putting on and taking off regular lower body clothing?  -- 2  -TB      Bathing (including washing, rinsing, and drying)  -- 2  -TB     Toileting (which includes using toilet bed pan or urinal)  -- 2  -TB     Putting on and taking off regular upper body clothing  -- 2  -TB     Taking care of personal grooming (such as brushing teeth)  -- 3  -TB     Eating meals  -- 3  -TB     Score  -- 14  -TB        Functional Assessment    Outcome Measure Options AM-PAC 6 Clicks Basic Mobility (PT)  -LM AM-PAC 6 Clicks Daily Activity (OT)  -TB       User Key  (r) = Recorded By, (t) = Taken By, (c) = Cosigned By    Initials Name Provider Type    TB Brigitte Witt, OT Occupational Therapist    JT Arizmendi PT Physical Therapist           Time Calculation:         PT Charges     Row Name 06/09/18 1327             Time Calculation    Start Time 1327  -LM      PT Received On 06/09/18  -      PT Goal Re-Cert Due Date 06/19/18  -        User Key  (r) = Recorded By, (t) = Taken By, (c) = Cosigned By    Initials Name Provider Type     Jaz Arizmendi PT Physical Therapist          Therapy Charges for Today     Code Description Service Date Service Provider Modifiers Qty    24892208410 HC PT EVAL LOW COMPLEXITY 4 6/9/2018 Jaz Arizmendi, LEENA GP 1          PT G-Codes  Outcome Measure Options: AM-PAC 6 Clicks Basic Mobility (PT)      Jaz Arizmendi PT  6/9/2018

## 2018-06-10 PROCEDURE — 25010000002 ENOXAPARIN PER 10 MG: Performed by: FAMILY MEDICINE

## 2018-06-10 PROCEDURE — 25010000002 CEFEPIME: Performed by: INTERNAL MEDICINE

## 2018-06-10 PROCEDURE — 99232 SBSQ HOSP IP/OBS MODERATE 35: CPT | Performed by: INTERNAL MEDICINE

## 2018-06-10 RX ADMIN — CEFEPIME 2 G: 2 INJECTION, POWDER, FOR SOLUTION INTRAVENOUS at 16:27

## 2018-06-10 RX ADMIN — ENOXAPARIN SODIUM 30 MG: 30 INJECTION SUBCUTANEOUS at 16:42

## 2018-06-10 RX ADMIN — CEFEPIME 2 G: 2 INJECTION, POWDER, FOR SOLUTION INTRAVENOUS at 04:50

## 2018-06-10 RX ADMIN — AMIODARONE HYDROCHLORIDE 200 MG: 200 TABLET ORAL at 08:37

## 2018-06-10 RX ADMIN — DOXYCYCLINE 100 MG: 100 INJECTION, POWDER, LYOPHILIZED, FOR SOLUTION INTRAVENOUS at 17:48

## 2018-06-10 RX ADMIN — DILTIAZEM HYDROCHLORIDE 120 MG: 120 CAPSULE, EXTENDED RELEASE ORAL at 08:37

## 2018-06-10 RX ADMIN — Medication 250 MG: at 08:37

## 2018-06-10 RX ADMIN — Medication 250 MG: at 23:14

## 2018-06-10 RX ADMIN — PANTOPRAZOLE SODIUM 40 MG: 40 TABLET, DELAYED RELEASE ORAL at 08:42

## 2018-06-10 RX ADMIN — DOXYCYCLINE 100 MG: 100 INJECTION, POWDER, LYOPHILIZED, FOR SOLUTION INTRAVENOUS at 03:46

## 2018-06-10 RX ADMIN — LEVOTHYROXINE SODIUM 75 MCG: 75 TABLET ORAL at 08:37

## 2018-06-10 NOTE — PROGRESS NOTES
[facility] Medicine Services  PROGRESS NOTE    Patient Name: Eliane Zamora  : 1929  MRN: [id number]    Date of Admission: 2018  Length of Stay: 3  Primary Care Physician: Jack Chilel MD    Subjective   Subjective     CC:  F/u fall, PNA    HPI:   present.  No issues overnight.  Patient has eaten all of her breakfast, remains pleasantly confused.    Review of Systems   Constitutional: Positive for fever.   Respiratory: Positive for cough and shortness of breath.    Cardiovascular: Negative for chest pain.   Gastrointestinal: Negative for abdominal pain.   Neurological: Positive for weakness.   All other systems reviewed and are negative.    Unable to provide due to dementia    Objective   Objective     Vital Signs:   Temp:  [98 °F (36.7 °C)-98.8 °F (37.1 °C)] 98.8 °F (37.1 °C)  Heart Rate:  [66-75] 68  Resp:  [16-18] 18  BP: (124-152)/(65-76) 151/69        Physical Exam:  Constitutional: age appropriate, sitting up in bed, feeding herself breakfast, no distress  HENT: NCAT, mucous membranes moist  Respiratory: some crackles right base, respiratory effort normal   Cardiovascular: RRR (sinus), no murmurs, rubs, or gallops   Gastrointestinal: Positive bowel sounds, soft, nontender, nondistended  Musculoskeletal: No bilateral ankle edema  Psychiatric: Appropriate affect, cooperative  Neurologic: mild confusion, no focal deficits  Skin: No rashes  Exam remains stable      Results Reviewed:  I have personally reviewed current lab, radiology, and data and agree.      Results from last 7 days  Lab Units 18  0714 18  0751   WBC 10*3/mm3 11.67* 12.34*   HEMOGLOBIN g/dL 10.8* 12.2   HEMATOCRIT % 34.4* 38.1   PLATELETS 10*3/mm3 232 269       Results from last 7 days  Lab Units 18  0635 18  0714 18  0751   SODIUM mmol/L 137 140 137   POTASSIUM mmol/L 3.8 3.9 3.7   CHLORIDE mmol/L 105 109 102   CO2 mmol/L 26.0 28.0 28.0   BUN mg/dL 12 13 22    CREATININE mg/dL 0.87 1.00 1.23   GLUCOSE mg/dL 83 81 106*   CALCIUM mg/dL 7.3* 7.5* 8.1*   ALT (SGPT) U/L  --   --  23   AST (SGOT) U/L  --   --  29   TROPONIN I ng/mL  --   --  0.021     Estimated Creatinine Clearance: 32.5 mL/min (by C-G formula based on SCr of 0.87 mg/dL).  No results found for: BNP  No results found for: PHART    Microbiology Results Abnormal     Procedure Component Value - Date/Time    Blood Culture - Blood, [[id number]]  (Normal) Collected:  06/07/18 0750    Lab Status:  Preliminary result Specimen:  Blood from Arm, Right Updated:  06/10/18 0815     Blood Culture No growth at 3 days    Blood Culture - Blood, [[id number]]  (Normal) Collected:  06/07/18 0755    Lab Status:  Preliminary result Specimen:  Blood from Arm, Left Updated:  06/10/18 0815     Blood Culture No growth at 3 days    Urine Culture - Urine, [[id number]]  (Abnormal)  (Susceptibility) Collected:  06/07/18 0747    Lab Status:  Final result Specimen:  Urine from Urine, Catheter Updated:  06/09/18 1109     Urine Culture >100,000 CFU/mL Acinetobacter baumannii complex / haemolyticus (A)    Susceptibility      Acinetobacter baumannii complex / haemolyticus     NIHARIKA     Ampicillin + Sulbactam >16/8 ug/ml Resistant     Cefepime <=8 ug/ml Susceptible     Cefotaxime 16 ug/ml Intermediate     Ceftazidime 4 ug/ml Susceptible     Ceftriaxone >32 ug/ml Resistant     Gentamicin <=4 ug/ml Susceptible     Levofloxacin <=2 ug/ml Susceptible     Meropenem <=1 ug/ml Susceptible     Tetracycline >8 ug/ml Resistant     Tobramycin <=4 ug/ml Susceptible     Trimethoprim + Sulfamethoxazole >2/38 ug/ml Resistant                          Imaging Results (last 24 hours)     ** No results found for the last 24 hours. **        Results for orders placed during the hospital encounter of 06/07/18   Adult Transthoracic Echo Complete W/ Cont if Necessary Per Protocol    Narrative · Left ventricular systolic function is normal.  · Estimated EF appears to be in  the range of 66 - 70%.  · Left atrial cavity size is mildly dilated.          I have reviewed the medications.    Assessment/Plan   Assessment / Plan     Hospital Problem List     * (Principal)Sepsis    UTI (urinary tract infection)    Right lower lobe pneumonia    PAF (paroxysmal atrial fibrillation)    Overview Addendum 11/16/2016 10:33 AM by Josefina Luis     1. Paroxysmal atrial fibrillation:  a. Rhythm control with sotalol.  b. Excessive bradyarrhythmias associated with digoxin.     Her Holter monitor showed essentially sinus rhythm with no excessive bradycardia and no excessive tachycardia.  Her EKG today shows sinus rhythm, corrected QT interval of 418 milliseconds. 05/07/15         Hypothyroidism    Overview Signed 11/16/2016 10:32 AM by Josefina Luis     On chronic supplementation         Hypercholesterolemia    Dementia             Brief Hospital Course to date:  Eliane Zamora is a 88 y.o. female presented to ED after fall during night at home.   unable to get up, so brought to ED.  Found to have temp to 103, UTI, and significant RLL PNA.      Assessment & Plan:  - Remains clinically improved from admission.    Reviewed CXR and CT, no evidence of mass on CT, all PNA.  Passed SLP eval.  Given resistant UTI, changed rocephin to cefipime for CAP coverage, continue doxy  - urine culture growing some resistant acinetobacter.  Changed abx to cefepime.  - resumed amio and dilt, no AC per last cardiology office note  - mental status is at baseline per .  Reviewed therapy notes, patient is mod assist.   - d/w family yesterday and they are now agreeable to SNF for some rehab.  CM notified, will await referrals tomorrow.  - ok to d/c tele    DVT Prophylaxis:  lovenox    CODE STATUS: Full Code    Disposition: I expect the patient to be discharged SNF ~2 days.    Electronically signed by Deuce Mauro MD, 06/10/18, 8:44 AM.

## 2018-06-10 NOTE — PLAN OF CARE
Problem: Fall Risk (Adult)  Goal: Identify Related Risk Factors and Signs and Symptoms  Outcome: Ongoing (interventions implemented as appropriate)    Goal: Absence of Fall  Outcome: Ongoing (interventions implemented as appropriate)      Problem: Skin Injury Risk (Adult)  Goal: Identify Related Risk Factors and Signs and Symptoms  Outcome: Ongoing (interventions implemented as appropriate)    Goal: Skin Health and Integrity  Outcome: Ongoing (interventions implemented as appropriate)      Problem: Infection, Risk/Actual (Adult)  Goal: Identify Related Risk Factors and Signs and Symptoms  Outcome: Ongoing (interventions implemented as appropriate)    Goal: Infection Prevention/Resolution  Outcome: Ongoing (interventions implemented as appropriate)      Problem: Patient Care Overview  Goal: Plan of Care Review  Outcome: Ongoing (interventions implemented as appropriate)   06/10/18 0531   Coping/Psychosocial   Plan of Care Reviewed With patient;spouse   Plan of Care Review   Progress no change   OTHER   Outcome Summary rested during the night. no acute episodes. vital signs stable.      Goal: Individualization and Mutuality  Outcome: Ongoing (interventions implemented as appropriate)    Goal: Discharge Needs Assessment  Outcome: Ongoing (interventions implemented as appropriate)    Goal: Interprofessional Rounds/Family Conf  Outcome: Ongoing (interventions implemented as appropriate)

## 2018-06-11 LAB — L PNEUMO1 AG UR QL IA: NEGATIVE

## 2018-06-11 PROCEDURE — 99232 SBSQ HOSP IP/OBS MODERATE 35: CPT | Performed by: INTERNAL MEDICINE

## 2018-06-11 PROCEDURE — 97110 THERAPEUTIC EXERCISES: CPT

## 2018-06-11 PROCEDURE — 97116 GAIT TRAINING THERAPY: CPT

## 2018-06-11 PROCEDURE — 25010000002 ENOXAPARIN PER 10 MG: Performed by: FAMILY MEDICINE

## 2018-06-11 PROCEDURE — 25010000002 CEFEPIME: Performed by: INTERNAL MEDICINE

## 2018-06-11 RX ADMIN — DOXYCYCLINE 100 MG: 100 INJECTION, POWDER, LYOPHILIZED, FOR SOLUTION INTRAVENOUS at 15:44

## 2018-06-11 RX ADMIN — PANTOPRAZOLE SODIUM 40 MG: 40 TABLET, DELAYED RELEASE ORAL at 05:14

## 2018-06-11 RX ADMIN — LEVOTHYROXINE SODIUM 75 MCG: 75 TABLET ORAL at 09:10

## 2018-06-11 RX ADMIN — CEFEPIME 2 G: 2 INJECTION, POWDER, FOR SOLUTION INTRAVENOUS at 18:07

## 2018-06-11 RX ADMIN — Medication 250 MG: at 21:15

## 2018-06-11 RX ADMIN — Medication 250 MG: at 09:10

## 2018-06-11 RX ADMIN — CEFEPIME 2 G: 2 INJECTION, POWDER, FOR SOLUTION INTRAVENOUS at 07:48

## 2018-06-11 RX ADMIN — AMIODARONE HYDROCHLORIDE 200 MG: 200 TABLET ORAL at 09:10

## 2018-06-11 RX ADMIN — DILTIAZEM HYDROCHLORIDE 120 MG: 120 CAPSULE, EXTENDED RELEASE ORAL at 09:10

## 2018-06-11 RX ADMIN — DOXYCYCLINE 100 MG: 100 INJECTION, POWDER, LYOPHILIZED, FOR SOLUTION INTRAVENOUS at 05:19

## 2018-06-11 RX ADMIN — ENOXAPARIN SODIUM 30 MG: 30 INJECTION SUBCUTANEOUS at 15:44

## 2018-06-11 NOTE — PROGRESS NOTES
Continued Stay Note  [facility]     Patient Name: Eliane Zamora  MRN: [id number]  Today's Date: 6/11/2018    Admit Date: 6/7/2018          Discharge Plan     Row Name 06/11/18 1106       Plan    Plan update    Patient/Family in Agreement with Plan yes    Plan Comments Per CM consult for rehab called patients CARLOS, Monika Gracielasean, regarding preferences and left message with callback number.  CM awaiting callback.      Final Discharge Disposition Code 03 - skilled nursing facility (SNF)              Discharge Codes    No documentation.       Expected Discharge Date and Time     Expected Discharge Date Expected Discharge Time    Jun 13, 2018             Esperanza Roman RN

## 2018-06-11 NOTE — PROGRESS NOTES
Continued Stay Note  [facility]     Patient Name: Eliane Zamora  MRN: [id number]  Today's Date: 6/11/2018    Admit Date: 6/7/2018          Discharge Plan     Row Name 06/11/18 1124       Plan    Plan update    Patient/Family in Agreement with Plan yes    Plan Comments Received a call from Monika Lane, daughter/POA, who reports that she would like for rehab to be as close as possible so that patients  can not drive very far to visit her.  [facility].  Advised that [place] is not accepting patients at this time.  Monika will be visiting patient today.  Called Benita with [facility] regarding referral who advises that they do not have any open beds but will evaluate.  CM will advise patients daughter of bed status and look for other options.  CM following.  Patient plan is to discharge to rehab via car with family to transport when medically ready and bed available.      Final Discharge Disposition Code 03 - skilled nursing facility (SNF)    Row Name 06/11/18 1106       Plan    Plan update    Patient/Family in Agreement with Plan yes    Plan Comments Per CM consult for rehab called patients CARLOS, Monika Lane, regarding preferences and left message with callback number.  CM awaiting callback.      Final Discharge Disposition Code 03 - skilled nursing facility (SNF)              Discharge Codes    No documentation.       Expected Discharge Date and Time     Expected Discharge Date Expected Discharge Time    Jun 13, 2018             Esperanza Roman RN

## 2018-06-11 NOTE — PLAN OF CARE
Problem: Patient Care Overview  Goal: Plan of Care Review  Outcome: Ongoing (interventions implemented as appropriate)   06/11/18 1023   Coping/Psychosocial   Plan of Care Reviewed With patient   Plan of Care Review   Progress no change   OTHER   Outcome Summary Pt ambulated 100ft with standard walker and CGA. Pt required increased cueing for proper management of RW. Pt continues to be limited by confusion. Continue to progress mobility as appropriate.

## 2018-06-11 NOTE — PROGRESS NOTES
"Clinical Nutrition   Reason For Visit: Follow-up protocol    Patient Name: Eliane Zamora  YOB: 1929  MRN: [id number]  Date of Encounter: 18 1:54 PM  Admission date: 2018        Nutrition Assessment     Hospital Problem List  Principal Problem:    Sepsis  Active Problems:    PAF (paroxysmal atrial fibrillation)    Hypothyroidism    Hypercholesterolemia    UTI (urinary tract infection)    Right lower lobe pneumonia    Dementia      PMH: She  has a past medical history of Abnormal stress echo; Dementia; GERD (gastroesophageal reflux disease); Hypercholesterolemia; Hypothyroidism; Osteoporosis; PAF (paroxysmal atrial fibrillation); and UTI (urinary tract infection).   PSxH: She  has a past surgical history that includes  section and Breast surgery.     Other Applicable Diagnosis:  Pleasantly confused      Reported/Observed/Food/Nutrition Related History   Patient and  present at time of visit. Patient reports her appetite is fine and she is eating well.  showed RD finished lunch tray which only the desert had been eaten from. Entree/sides untouched. I asked patient if she feels hungry and she said \"not now after that meal\". Reports she sometimes drinks the oral nutrition supplement drinks but not always. Would like to receive vanilla/strawberry Boost mixed with ice cream for shakes. No additional preferences/requests at this time.        Anthropometrics   Height: 60 in  Weight: 101 lbs (standing weight  per nsg documentation)  BMI: 19.8  BMI classification: Normal: 18.5-24.9kg/m2   UBW:  unable to provide this information  Weight hx per EMR:  106 lbs (MD office visit 17)  Weight change: Based off of EMR weight hx, patient has had unintentional weight loss of 5 lbs (4.7%) x 6 mo        Labs reviewed   Labs reviewed: Yes      Medications reviewed   Medications reviewed: Yes      Current Nutrition Prescription   PO: Diet Regular; Cardiac  Oral Nutrition " Supplement: Boost Plus 2x/d    Evaluation of Received Nutrient/Fluid Intake: 45% / 6 meals      Nutrition Diagnosis     Problem Inadequate oral intake   Etiology Clinical condition   Signs/Symptoms PO intake: 45% / 6 meals with occasional consumption of oral nutrition supplement drink       Problem Unintended weight loss   Etiology Clinical condition   Signs/Symptoms Unintentional weight loss of 5 lbs (4.7%) x 6 mo       Intervention     Goal:   General: Nutrition support treatment  PO: Increase intake    Intervention: Follow treatment progress, Care plan reviewed, Interview for preferences, Adjusted supplement, Encourage intake     Added vanilla ice cream to Boost Plus supplements 2x/day (strawberry/vanilla only)      Monitoring/Evaluation:       Monitoring/Evaluation: Per protocol, PO intake, Weight  Will Continue to follow per protocol  Jackeline Hollins RD  Time Spent: 20 min

## 2018-06-11 NOTE — THERAPY TREATMENT NOTE
Acute Care - Physical Therapy Treatment Note  [facility]     Patient Name: Eliane Zamora  : 1929  MRN: [id number]  Today's Date: 2018  Onset of Illness/Injury or Date of Surgery: 18  Date of Referral to PT: 18  Referring Physician: Barry Morales MD    Admit Date: 2018    Visit Dx:    ICD-10-CM ICD-9-CM   1. Sepsis, due to unspecified organism A41.9 038.9     995.91   2. Pneumonia of right lung due to infectious organism, unspecified part of lung J18.9 483.8   3. Urinary tract infection with hematuria, site unspecified N39.0 599.0    R31.9    4. Dehydration, moderate E86.0 276.51   5. Impaired mobility and ADLs Z74.09 799.89   6. Impaired functional mobility, balance, gait, and endurance Z74.09 V49.89     Patient Active Problem List   Diagnosis   • PAF (paroxysmal atrial fibrillation)   • Hypothyroidism   • Abnormal stress echo   • Osteoporosis   • Hypercholesterolemia   • GERD (gastroesophageal reflux disease)   • Sepsis   • UTI (urinary tract infection)   • Right lower lobe pneumonia   • Dementia       Therapy Treatment          Rehabilitation Treatment Summary     Row Name 18 0833             Treatment Time/Intention    Discipline physical therapist  -KR      Document Type therapy note (daily note)  -KR      Subjective Information no complaints  -KR      Mode of Treatment physical therapy  -KR      Care Plan Review risks/benefits reviewed;patient/other agree to care plan  -KR      Care Plan Review, Other Participant(s) spouse  -KR      Therapy Frequency (PT Clinical Impression) daily  -KR      Patient Effort good  -KR      Existing Precautions/Restrictions fall;other (see comments)   dementia  -KR      Treatment Considerations/Comments    -KR      Recorded by [KR] Rowan Shukla, PT 18 1023      Row Name 18 0833             Vital Signs    Pre Systolic BP Rehab 147  -KR      Pre Treatment Diastolic BP 67  -KR      O2 Delivery Pre Treatment room air  -KR       O2 Delivery Post Treatment room air  -KR      Pre Patient Position Supine  -KR      Intra Patient Position Standing  -KR      Post Patient Position Supine  -KR      Recorded by [KR] Rowan Shukla, PT 06/11/18 1023      Row Name 06/11/18 0833             Cognitive Assessment/Intervention    Additional Documentation Cognitive Assessment/Intervention (Group)  -KR      Recorded by [KR] Rowan Shukla, PT 06/11/18 1023      Row Name 06/11/18 0833             Cognitive Assessment/Intervention- PT/OT    Affect/Mental Status (Cognitive) confused  -KR      Orientation Status (Cognition) oriented to;person  -KR      Follows Commands (Cognition) follows one step commands;50-74% accuracy;repetition of directions required;verbal cues/prompting required  -KR      Cognitive Function (Cognitive) safety deficit  -KR      Safety Deficit (Cognitive) severe deficit;ability to follow commands;awareness of need for assistance;insight into deficits/self awareness;safety precautions awareness;safety precautions follow-through/compliance  -KR      Personal Safety Interventions fall prevention program maintained;gait belt;nonskid shoes/slippers when out of bed  -KR      Recorded by [KR] Rowan Shukla, PT 06/11/18 1023      Row Name 06/11/18 0833             Safety Issues, Functional Mobility    Safety Issues Affecting Function (Mobility) ability to follow commands;at risk behavior observed;awareness of need for assistance;insight into deficits/self awareness;safety precaution awareness;safety precautions follow-through/compliance  -KR      Impairments Affecting Function (Mobility) balance;cognition;endurance/activity tolerance;strength  -KR      Recorded by [KR] Rowan Shukla, PT 06/11/18 1023      Row Name 06/11/18 0833             Bed Mobility Assessment/Treatment    Bed Mobility Assessment/Treatment supine-sit;sit-supine  -KR      Supine-Sit [place] (Bed Mobility) contact guard;verbal cues  -KR      Sit-Supine [place] (Bed  Mobility) contact guard;verbal cues  -KR      Bed Mobility, Safety Issues cognitive deficits limit understanding;decreased use of arms for pushing/pulling;decreased use of legs for bridging/pushing  -KR      Assistive Device (Bed Mobility) bed rails;head of bed elevated  -KR      Comment (Bed Mobility) VC's for sequencing.   -KR      Recorded by [KR] Rowan Shukla, PT 06/11/18 1023      Row Name 06/11/18 0833             Transfer Assessment/Treatment    Transfer Assessment/Treatment sit-stand transfer;stand-sit transfer  -KR      Comment (Transfers) VC's for sequencing and hand placement. Pt attempted to stand prior to instruction.   -KR      Sit-Stand [place] (Transfers) contact guard;verbal cues  -KR      Stand-Sit [place] (Transfers) contact guard;verbal cues  -KR      Recorded by [KR] Rowan Shukla, PT 06/11/18 1023      Row Name 06/11/18 0833             Sit-Stand Transfer    Assistive Device (Sit-Stand Transfers) walker, standard  -KR      Recorded by [KR] Rowan Shukla, PT 06/11/18 1023      Row Name 06/11/18 0833             Stand-Sit Transfer    Assistive Device (Stand-Sit Transfers) walker, standard  -KR      Recorded by [KR] Rowan Shukla, PT 06/11/18 1023      Row Name 06/11/18 0833             Gait/Stairs Assessment/Training    Gait/Stairs Assessment/Training gait/ambulation assistive device  -KR      [place] Level (Gait) contact guard;verbal cues  -KR      Assistive Device (Gait) walker, standard  -KR      Distance in Feet (Gait) 100  -KR      Pattern (Gait) step-to  -KR      Deviations/Abnormal Patterns (Gait) base of support, narrow;sofia decreased;other (see comments)   decreased step length  -KR      Comment (Gait/Stairs) Pt limited by confusion and difficulty following commands. Pt attempted to ambulate prior to instruction. Pt required frequent cueing for proper management of standard walker; pt had tendency to  walker and carry it during gait.   -KR      Recorded by  [KR] Rowan Shukla, PT 06/11/18 1023      Row Name 06/11/18 0833             Motor Skills Assessment/Interventions    Additional Documentation Balance (Group)  -KR      Recorded by [KR] Rowan Shukla, PT 06/11/18 1023      Row Name 06/11/18 0833             Balance    Balance static sitting balance;static standing balance  -KR      Recorded by [KR] Rowan Shukla, PT 06/11/18 1023      Row Name 06/11/18 0833             Static Sitting Balance    Level of [place] (Unsupported Sitting, Static Balance) supervision  -KR      Sitting Position (Unsupported Sitting, Static Balance) sitting on edge of bed  -KR      Recorded by [KR] Rowan Shukla, PT 06/11/18 1023      Row Name 06/11/18 0833             Static Standing Balance    Level of [place] (Supported Standing, Static Balance) supervision  -KR      Assistive Device Utilized (Supported Standing, Static Balance) standard walker  -KR      Recorded by [KR] Rowan Shukla, PT 06/11/18 1023      Row Name 06/11/18 0833             Positioning and Restraints    Pre-Treatment Position in bed  -KR      Post Treatment Position bed  -KR      In Bed notified nsg;supine;call light within reach;encouraged to call for assist;exit alarm on;with family/caregiver;side rails up x3  -KR      Recorded by [BRE] Rowan Shukla, PT 06/11/18 1023      Row Name 06/11/18 0833             Pain Assessment    Additional Documentation Pain Scale: Numbers Pre/Post-Treatment (Group)  -KR      Recorded by [KR] Rowan Shukla, PT 06/11/18 1023      Row Name 06/11/18 0833             Pain Scale: Numbers Pre/Post-Treatment    Pain Scale: Numbers, Pretreatment 0/10 - no pain  -KR      Pain Scale: Numbers, Post-Treatment 0/10 - no pain  -KR      Recorded by [KR] Rowan Shukla, PT 06/11/18 1023      Row Name 06/11/18 0833             Plan of Care Review    Plan of Care Reviewed With patient;spouse  -KR      Recorded by [BRE] Rowan Shukla, PT 06/11/18 1023      Row Name 06/11/18 0833              Outcome Summary/Treatment Plan (PT)    Daily Summary of Progress (PT) progress toward functional goals as expected  -KR      Recorded by [KR] Rowan Shukla, PT 06/11/18 1023        User Key  (r) = Recorded By, (t) = Taken By, (c) = Cosigned By    Initials Name Effective Dates Discipline    KR Rowan PALACIOS Vazquez, PT 04/03/18 -  PT                     Physical Therapy Education     Title: PT OT SLP Therapies (Active)     Topic: Physical Therapy (Active)     Point: Mobility training (Active)    Learning Progress Summary     Learner Status Readiness Method Response Comment Documented by    Patient Active Acceptance E NR  KR 06/11/18 1023     Active Acceptance E NR  LM 06/09/18 1357    Family Active Acceptance E NR  LM 06/09/18 1357          Point: Body mechanics (Active)    Learning Progress Summary     Learner Status Readiness Method Response Comment Documented by    Patient Active Acceptance E NR  KR 06/11/18 1023          Point: Precautions (Active)    Learning Progress Summary     Learner Status Readiness Method Response Comment Documented by    Patient Active Acceptance E NR  KR 06/11/18 1023     Active Acceptance E NR  LM 06/09/18 1357    Family Active Acceptance E NR  LM 06/09/18 1357                      User Key     Initials Effective Dates Name Provider Type Discipline     06/15/16 -  Jaz Arizmendi, PT Physical Therapist PT     04/03/18 -  Rowan Shukla, PT Physical Therapist PT                    PT Recommendation and Plan  Therapy Frequency (PT Clinical Impression): daily  Outcome Summary/Treatment Plan (PT)  Daily Summary of Progress (PT): progress toward functional goals as expected  Plan of Care Reviewed With: patient  Progress: no change  Outcome Summary: Pt ambulated 100ft with standard walker and CGA. Pt required increased cueing for proper management of RW. Pt continues to be limited by confusion. Continue to progress mobility as appropriate.           Outcome Measures     Row Name 06/11/18 0833  06/09/18 1327          How much help from another person do you currently need...    Turning from your back to your side while in flat bed without using bedrails? 3  -KR 3  -LM     Moving from lying on back to sitting on the side of a flat bed without bedrails? 3  -KR 3  -LM     Moving to and from a bed to a chair (including a wheelchair)? 3  -KR 3  -LM     Standing up from a chair using your arms (e.g., wheelchair, bedside chair)? 3  -KR 3  -LM     Climbing 3-5 steps with a railing? 2  -KR 3  -LM     To walk in hospital room? 3  -KR 3  -LM     AM-PAC 6 Clicks Score 17  -KR 18  -LM        Functional Assessment    Outcome Measure Options AM-PAC 6 Clicks Basic Mobility (PT)  -KR AM-PAC 6 Clicks Basic Mobility (PT)  -LM       User Key  (r) = Recorded By, (t) = Taken By, (c) = Cosigned By    Initials Name Provider Type    JT Arizmendi, PT Physical Therapist    BRE Shukla, PT Physical Therapist           Time Calculation:         PT Charges     Row Name 06/11/18 1025             Time Calculation    Start Time 0833  -KR      PT Received On 06/11/18  -KR      PT Goal Re-Cert Due Date 06/19/18  -KR         Time Calculation- PT    Total Timed Code Minutes- PT 23 minute(s)  -KR        User Key  (r) = Recorded By, (t) = Taken By, (c) = Cosigned By    Initials Name Provider Type    BRE Shukla, PT Physical Therapist          Therapy Charges for Today     Code Description Service Date Service Provider Modifiers Qty    00132078296 HC GAIT TRAINING EA 15 MIN 6/11/2018 Rowan Shukla, PT GP 1    62643552545 HC PT THER PROC EA 15 MIN 6/11/2018 Rowan Shukla, PT GP 1    83038989071 HC PT THER SUPP EA 15 MIN 6/11/2018 Rowan Shukla, PT GP 2          PT G-Codes  Outcome Measure Options: AM-PAC 6 Clicks Basic Mobility (PT)    Grace Shukla, PT  6/11/2018

## 2018-06-11 NOTE — PROGRESS NOTES
[facility] Medicine Services  PROGRESS NOTE    Patient Name: Eliane Zamora  : 1929  MRN: [id number]    Date of Admission: 2018  Length of Stay: 4  Primary Care Physician: Jack Chilel MD    Subjective   Subjective     CC:  F/u fall, PNA    HPI:  No issues overnight.  She voices no complaints, remains pleasantly confused.    Review of Systems   Constitutional: Positive for fever.   Respiratory: Positive for cough and shortness of breath.    Cardiovascular: Negative for chest pain.   Gastrointestinal: Negative for abdominal pain.   Neurological: Positive for weakness.   All other systems reviewed and are negative.    Unable to provide due to dementia    Objective   Objective     Vital Signs:   Temp:  [97.6 °F (36.4 °C)-98.5 °F (36.9 °C)] 98.5 °F (36.9 °C)  Heart Rate:  [63-72] 63  Resp:  [18] 18  BP: (129-148)/(56-81) 147/67        Physical Exam:  Constitutional: age appropriate, lying in bed, no distress  HENT: NCAT, mucous membranes moist  Respiratory: some crackles right base, respiratory effort normal   Cardiovascular: RRR, no murmurs, rubs, or gallops   Gastrointestinal: Positive bowel sounds, soft, nontender, nondistended  Musculoskeletal: No bilateral ankle edema  Psychiatric: Appropriate affect, cooperative  Neurologic: mild confusion, no focal deficits  Skin: No rashes  Exam remains stable from previous      Results Reviewed:  I have personally reviewed current lab, radiology, and data and agree.      Results from last 7 days  Lab Units 18  0714 18  0751   WBC 10*3/mm3 11.67* 12.34*   HEMOGLOBIN g/dL 10.8* 12.2   HEMATOCRIT % 34.4* 38.1   PLATELETS 10*3/mm3 232 269       Results from last 7 days  Lab Units 18  0635 18  0714 18  0751   SODIUM mmol/L 137 140 137   POTASSIUM mmol/L 3.8 3.9 3.7   CHLORIDE mmol/L 105 109 102   CO2 mmol/L 26.0 28.0 28.0   BUN mg/dL 12 13 22   CREATININE mg/dL 0.87 1.00 1.23   GLUCOSE mg/dL 83 81 106*    CALCIUM mg/dL 7.3* 7.5* 8.1*   ALT (SGPT) U/L  --   --  23   AST (SGOT) U/L  --   --  29   TROPONIN I ng/mL  --   --  0.021     Estimated Creatinine Clearance: 32.5 mL/min (by C-G formula based on SCr of 0.87 mg/dL).  No results found for: BNP  No results found for: PHART    Microbiology Results Abnormal     Procedure Component Value - Date/Time    Blood Culture - Blood, [[id number]]  (Normal) Collected:  06/07/18 0750    Lab Status:  Preliminary result Specimen:  Blood from Arm, Right Updated:  06/11/18 0815     Blood Culture No growth at 4 days    Blood Culture - Blood, [[id number]]  (Normal) Collected:  06/07/18 0755    Lab Status:  Preliminary result Specimen:  Blood from Arm, Left Updated:  06/11/18 0815     Blood Culture No growth at 4 days    Urine Culture - Urine, [[id number]]  (Abnormal)  (Susceptibility) Collected:  06/07/18 0747    Lab Status:  Final result Specimen:  Urine from Urine, Catheter Updated:  06/09/18 1109     Urine Culture >100,000 CFU/mL Acinetobacter baumannii complex / haemolyticus (A)    Susceptibility      Acinetobacter baumannii complex / haemolyticus     NIHARIKA     Ampicillin + Sulbactam >16/8 ug/ml Resistant     Cefepime <=8 ug/ml Susceptible     Cefotaxime 16 ug/ml Intermediate     Ceftazidime 4 ug/ml Susceptible     Ceftriaxone >32 ug/ml Resistant     Gentamicin <=4 ug/ml Susceptible     Levofloxacin <=2 ug/ml Susceptible     Meropenem <=1 ug/ml Susceptible     Tetracycline >8 ug/ml Resistant     Tobramycin <=4 ug/ml Susceptible     Trimethoprim + Sulfamethoxazole >2/38 ug/ml Resistant                          Imaging Results (last 24 hours)     ** No results found for the last 24 hours. **        Results for orders placed during the hospital encounter of 06/07/18   Adult Transthoracic Echo Complete W/ Cont if Necessary Per Protocol    Narrative · Left ventricular systolic function is normal.  · Estimated EF appears to be in the range of 66 - 70%.  · Left atrial cavity size is mildly  dilated.          I have reviewed the medications.    Assessment/Plan   Assessment / Plan     Hospital Problem List     * (Principal)Sepsis    UTI (urinary tract infection)    Right lower lobe pneumonia    PAF (paroxysmal atrial fibrillation)    Overview Addendum 11/16/2016 10:33 AM by Josefina Luis     1. Paroxysmal atrial fibrillation:  a. Rhythm control with sotalol.  b. Excessive bradyarrhythmias associated with digoxin.     Her Holter monitor showed essentially sinus rhythm with no excessive bradycardia and no excessive tachycardia.  Her EKG today shows sinus rhythm, corrected QT interval of 418 milliseconds. 05/07/15         Hypothyroidism    Overview Signed 11/16/2016 10:32 AM by Josefina Luis     On chronic supplementation         Hypercholesterolemia    Dementia             Brief Hospital Course to date:  Eliane Zamora is a 88 y.o. female presented to ED after fall during night at home.   unable to get up, so brought to ED.  Found to have temp to 103, UTI, and significant RLL PNA.      Assessment & Plan:  - Remains clinically improved from admission.    Reviewed CXR and CT, no evidence of mass on CT, all PNA.  Passed SLP eval.  Given resistant UTI, changed rocephin to cefipime for CAP coverage, continue doxy.  Complete 7 day course  - urine culture growing some resistant acinetobacter.  Changed abx to cefepime.  - resumed amio and dilt, no AC per last cardiology office note  - mental status is at baseline per .  Reviewed therapy notes, patient is mod assist.   - d/w family connie weekend and they are now agreeable to SNF for some rehab.  Will d/w with CM on rounds this morning.    DVT Prophylaxis:  lovenox    CODE STATUS: Full Code    Disposition: I expect the patient to be discharged SNF ~2 days.    Electronically signed by Deuce Mauro MD, 06/11/18, 9:01 AM.

## 2018-06-12 LAB
BACTERIA SPEC AEROBE CULT: NORMAL
BACTERIA SPEC AEROBE CULT: NORMAL

## 2018-06-12 PROCEDURE — 25010000002 ENOXAPARIN PER 10 MG: Performed by: FAMILY MEDICINE

## 2018-06-12 PROCEDURE — 97530 THERAPEUTIC ACTIVITIES: CPT

## 2018-06-12 PROCEDURE — 99232 SBSQ HOSP IP/OBS MODERATE 35: CPT | Performed by: NURSE PRACTITIONER

## 2018-06-12 PROCEDURE — 25010000002 CEFEPIME: Performed by: INTERNAL MEDICINE

## 2018-06-12 PROCEDURE — 97535 SELF CARE MNGMENT TRAINING: CPT

## 2018-06-12 RX ORDER — DOXYCYCLINE 100 MG/1
100 CAPSULE ORAL EVERY 12 HOURS SCHEDULED
Status: DISCONTINUED | OUTPATIENT
Start: 2018-06-12 | End: 2018-06-13 | Stop reason: HOSPADM

## 2018-06-12 RX ADMIN — CEFEPIME 2 G: 2 INJECTION, POWDER, FOR SOLUTION INTRAVENOUS at 17:09

## 2018-06-12 RX ADMIN — CEFEPIME 2 G: 2 INJECTION, POWDER, FOR SOLUTION INTRAVENOUS at 06:05

## 2018-06-12 RX ADMIN — LEVOTHYROXINE SODIUM 75 MCG: 75 TABLET ORAL at 08:19

## 2018-06-12 RX ADMIN — DOXYCYCLINE 100 MG: 100 CAPSULE ORAL at 11:15

## 2018-06-12 RX ADMIN — ENOXAPARIN SODIUM 30 MG: 30 INJECTION SUBCUTANEOUS at 16:17

## 2018-06-12 RX ADMIN — Medication 250 MG: at 20:46

## 2018-06-12 RX ADMIN — DILTIAZEM HYDROCHLORIDE 120 MG: 120 CAPSULE, EXTENDED RELEASE ORAL at 08:19

## 2018-06-12 RX ADMIN — DOXYCYCLINE 100 MG: 100 INJECTION, POWDER, LYOPHILIZED, FOR SOLUTION INTRAVENOUS at 04:31

## 2018-06-12 RX ADMIN — PANTOPRAZOLE SODIUM 40 MG: 40 TABLET, DELAYED RELEASE ORAL at 06:05

## 2018-06-12 RX ADMIN — AMIODARONE HYDROCHLORIDE 200 MG: 200 TABLET ORAL at 08:19

## 2018-06-12 RX ADMIN — DOXYCYCLINE 100 MG: 100 CAPSULE ORAL at 20:46

## 2018-06-12 RX ADMIN — Medication 250 MG: at 08:19

## 2018-06-12 NOTE — PLAN OF CARE
Problem: Patient Care Overview  Goal: Plan of Care Review   06/12/18 0411   OTHER   Outcome Summary Pt remained pleasantly confused overnight. Vitals stable. Team looking for placement.

## 2018-06-12 NOTE — PLAN OF CARE
Problem: Patient Care Overview  Goal: Plan of Care Review  Outcome: Ongoing (interventions implemented as appropriate)   06/12/18 1111   Coping/Psychosocial   Plan of Care Reviewed With patient;spouse;daughter   Plan of Care Review   Progress improving   OTHER   Outcome Summary Pt demonstrated improved strength and balance this date. She met her functional mobility goal and partially met her transfer goal. Pt with good effort and excellent support system. She continues to require cues for safety. Cont OT POC

## 2018-06-12 NOTE — PROGRESS NOTES
Continued Stay Note  [facility]     Patient Name: Eliane Zamora  MRN: [id number]  Today's Date: 6/12/2018    Admit Date: 6/7/2018          Discharge Plan     Row Name 06/12/18 1342       Plan    Plan update    Patient/Family in Agreement with Plan yes    Plan Comments Called Benita with [facility] for update on bed availabiltiy and left message with callback number.  Called patients daughter/POA, Monika Lane, to discuss need to look at other options who was agreeable to referrals to [facility].  [facility] with [facility] referrals.  CM following.  Patient plan is to discharge to rehab when bed available.      Final Discharge Disposition Code 03 - skilled nursing facility (SNF)              Discharge Codes    No documentation.       Expected Discharge Date and Time     Expected Discharge Date Expected Discharge Time    Jun 13, 2018             Esperanza Roman RN

## 2018-06-12 NOTE — THERAPY TREATMENT NOTE
Acute Care - Occupational Therapy Treatment Note  [facility]     Patient Name: Eliane Zamora  : 1929  MRN: [id number]  Today's Date: 2018  Onset of Illness/Injury or Date of Surgery: 18  Date of Referral to OT: 18  Referring Physician: Barry Morales MD    Admit Date: 2018       ICD-10-CM ICD-9-CM   1. Sepsis, due to unspecified organism A41.9 038.9     995.91   2. Pneumonia of right lung due to infectious organism, unspecified part of lung J18.9 483.8   3. Urinary tract infection with hematuria, site unspecified N39.0 599.0    R31.9    4. Dehydration, moderate E86.0 276.51   5. Impaired mobility and ADLs Z74.09 799.89   6. Impaired functional mobility, balance, gait, and endurance Z74.09 V49.89     Patient Active Problem List   Diagnosis   • PAF (paroxysmal atrial fibrillation)   • Hypothyroidism   • Abnormal stress echo   • Osteoporosis   • Hypercholesterolemia   • GERD (gastroesophageal reflux disease)   • Sepsis   • UTI (urinary tract infection)   • Right lower lobe pneumonia   • Dementia     Past Medical History:   Diagnosis Date   • Abnormal stress echo     History of abnormal stress echocardiogram with recent echocardiogram showing borderline LV function.    • Dementia    • GERD (gastroesophageal reflux disease)    • Hypercholesterolemia    • Hypothyroidism    • Osteoporosis     /Mild degenerative joint disease   • PAF (paroxysmal atrial fibrillation)    • UTI (urinary tract infection)     Current urinary tract infection by laboratory value, 2013:  Less than 100,000 units/mL of E. coli.     Past Surgical History:   Procedure Laterality Date   • BREAST SURGERY      benign breast biopsy   •  SECTION         Therapy Treatment          Rehabilitation Treatment Summary     Row Name 18 1034             Treatment Time/Intention    Discipline occupational therapist  -      Document Type therapy note (daily note)  -MC      Subjective Information complains of    being cold. OT adjusted thermostat in room  -      Mode of Treatment individual therapy;occupational therapy  -      Patient/Family Observations Pt received in supine, IV intact,  and dtr present  -      Patient Effort good  -      Existing Precautions/Restrictions fall;other (see comments)   Dementia  -      Recorded by [] Christina ROSEANN Patel, OT 06/12/18 1110      Row Name 06/12/18 1034             Cognitive Assessment/Intervention- PT/OT    Affect/Mental Status (Cognitive) confused  -      Orientation Status (Cognition) oriented to;person  -      Follows Commands (Cognition) follows one step commands;50-74% accuracy  -      Cognitive Function (Cognitive) safety deficit  -      Safety Deficit (Cognitive) moderate deficit  -      Personal Safety Interventions fall prevention program maintained;gait belt;muscle strengthening facilitated;nonskid shoes/slippers when out of bed;supervised activity  -      Recorded by [] Christina Patel, OT 06/12/18 1110      Row Name 06/12/18 1034             Bed Mobility Assessment/Treatment    Bed Mobility Assessment/Treatment scooting/bridging  -      Supine-Sit [place] (Bed Mobility) supervision;verbal cues  -      Sit-Supine [place] (Bed Mobility) not tested  -      Bed Mobility, Safety Issues cognitive deficits limit understanding;decreased use of arms for pushing/pulling;decreased use of legs for bridging/pushing  -      Assistive Device (Bed Mobility) bed rails;head of bed elevated  -      Comment (Bed Mobility) VC for sequencing and hand placement  -      Recorded by [] Christina ROSEANN Patel, OT 06/12/18 1110      Row Name 06/12/18 1034             Functional Mobility    Functional Mobility- Ind. Level contact guard assist;verbal cues required  -      Functional Mobility- Device standard walker  -      Functional Mobility- Safety Issues balance decreased during turns;steps too close front assistive device  -      Functional Mobility-  Comment Cues for safety during mobility  -MC      Recorded by [MC] Christina Patel, OT 06/12/18 1110      Row Name 06/12/18 1034             Transfer Assessment/Treatment    Transfer Assessment/Treatment sit-stand transfer;stand-sit transfer;bed-chair transfer  -MC      Comment (Transfers) Cues for hand placement, safe transfer technique  -MC      Bed-Chair [place] (Transfers) stand by assist  -MC      Assistive Device (Bed-Chair Transfers) walker, standard  -      Sit-Stand [place] (Transfers) stand by assist  -MC      Stand-Sit [place] (Transfers) stand by assist  -MC      Recorded by [MC] Christina Patel, OT 06/12/18 1110      Row Name 06/12/18 1034             Sit-Stand Transfer    Assistive Device (Sit-Stand Transfers) walker, standard  -MC      Recorded by [MC] Christina Patel OT 06/12/18 1110      Row Name 06/12/18 1034             Stand-Sit Transfer    Assistive Device (Stand-Sit Transfers) walker, front-wheeled  -MC      Recorded by [MC] Christina Patel OT 06/12/18 1110      Row Name 06/12/18 1034             ADL Assessment/Intervention    89640 - OT Self Care/Mgmt Minutes 10  -MC      Recorded by [MC] Christina Patel, OT 06/12/18 1110      Row Name 06/12/18 1034             Upper Body Dressing Assessment/Training    Upper Body Dressing [place] Level doff;don;pajama/robe;minimum assist (75% patient effort)  -      Upper Body Dressing Position edge of bed sitting  -      Comment (Upper Body Dressing) Pt required cues and assist due to it being a hospital gown and not her typical night gown  -MC      Recorded by [MC] Christina Patel, OT 06/12/18 1110      Row Name 06/12/18 1034             Lower Body Dressing Assessment/Training    Lower Body Dressing [place] Level don;socks;moderate assist (50% patient effort)  -MC      Recorded by [MC] Christina Patel OT 06/12/18 1110      Row Name 06/12/18 1034             Grooming Assessment/Training    [place] Level (Grooming) wash face, hands;set up;verbal  cues  -MC      Grooming Position supported sitting  -MC      Recorded by [MC] Christina Patel, OT 06/12/18 1110      Row Name 06/12/18 1034             BADL Safety/Performance    Impairments, BADL Safety/Performance balance;strength;motor planning  -MC      Recorded by [MC] Christina Patel OT 06/12/18 1110      Row Name 06/12/18 1034             Motor Skills Assessment/Interventions    Additional Documentation Therapeutic Exercise (Group)  -MC      Recorded by [MC] Christina Patel OT 06/12/18 1110      Row Name 06/12/18 1034             Therapeutic Exercise    15097 - OT Therapeutic Exercise Minutes 2  -MC      39287 - OT Therapeutic Activity Minutes 12  -MC      Recorded by [MC] Christina Patel OT 06/12/18 1110      Row Name 06/12/18 1034             Therapeutic Exercise    Upper Extremity Range of Motion (Therapeutic Exercise) shoulder flexion/extension, bilateral;shoulder abduction/adduction, bilateral;shoulder horizontal abduction/adduction, bilateral;elbow flexion/extension, bilateral  -MC      Exercise Type (Therapeutic Exercise) AROM (active range of motion)  -MC      Position (Therapeutic Exercise) seated  -MC      Sets/Reps (Therapeutic Exercise) 10x 1  -MC      Expected Outcome (Therapeutic Exercise) improve functional stability  -MC      Recorded by [MC] Christina Patel, TEA 06/12/18 1110      Row Name 06/12/18 1034             Static Sitting Balance    Level of [place] (Unsupported Sitting, Static Balance) standby assist  -MC      Sitting Position (Unsupported Sitting, Static Balance) sitting on edge of bed  -MC      Recorded by [MC] Christina Patel OT 06/12/18 1110      Row Name 06/12/18 1034             Static Standing Balance    Level of [place] (Supported Standing, Static Balance) standby assist  -MC      Recorded by [MC] Christina Patel OT 06/12/18 1110      Row Name 06/12/18 1034             Dynamic Standing Balance    Level of [place], Reaches Outside Midline (Standing, Dynamic Balance) contact guard  assist  -MC      Recorded by [MC] Christina WHITFIELD Jorge, OT 06/12/18 1110      Row Name 06/12/18 1034             Positioning and Restraints    Pre-Treatment Position in bed  -      Post Treatment Position chair  -MC      In Chair notified nsg;reclined;call light within reach;encouraged to call for assist;exit alarm on;with family/caregiver;legs elevated  -MC      Recorded by [MC] Christina WHITFIELD Jorge, OT 06/12/18 1110      Row Name 06/12/18 1034             Pain Scale: Numbers Pre/Post-Treatment    Pain Scale: Numbers, Pretreatment 0/10 - no pain  -MC      Pain Scale: Numbers, Post-Treatment 0/10 - no pain  -MC      Recorded by [MC] Christina WHITFIELD Jorge, OT 06/12/18 1110      Row Name 06/12/18 1034             Coping    Observed Emotional State calm;cooperative  -      Verbalized Emotional State acceptance  -MC      Recorded by [MC] Christina WHIFTIELD Jorge, OT 06/12/18 1110      Row Name 06/12/18 1034             Plan of Care Review    Plan of Care Reviewed With patient;spouse;daughter  -MC      Recorded by [MC] Christina WHITFIELD Jorge, OT 06/12/18 1110      Row Name 06/12/18 1034             Outcome Summary/Treatment Plan (OT)    Daily Summary of Progress (OT) progress toward functional goals is good  -      Recorded by [] Christina WHITFIELD Jorge, OT 06/12/18 1110        User Key  (r) = Recorded By, (t) = Taken By, (c) = Cosigned By    Initials Name Effective Dates Discipline     Christina Patel, OT 03/14/16 -  OT                   OT Rehab Goals     Row Name 06/12/18 1034             Transfer Goal 1 (OT)    Activity/Assistive Device (Transfer Goal 1, OT) sit-to-stand/stand-to-sit;toilet;walker, rolling   to BR for transfer to support household distances  -      [place] Level/Cues Needed (Transfer Goal 1, OT) minimum assist (75% or more patient effort)  -      Time Frame (Transfer Goal 1, OT) by discharge  -      Barriers (Transfers Goal 1, OT) cognition, strength, balance  -      Progress/Outcome (Transfer Goal 1, OT) goal partially met;other (see comments)    met sit < > stand  -         Bathing Goal 1 (OT)    Activity/Assistive Device (Bathing Goal 1, OT) lower body bathing;long-handled sponge;shower chair  -      [place] Level/Cues Needed (Bathing Goal 1, OT) minimum assist (75% or more patient effort)  -      Time Frame (Bathing Goal 1, OT) by discharge  -      Barriers (Bathing Goal 1, OT) cognition, strength, balance  -        User Key  (r) = Recorded By, (t) = Taken By, (c) = Cosigned By    Initials Name Provider Type Discipline     Christina Patel, OT Occupational Therapist OT        Occupational Therapy Education     Title: PT OT SLP Therapies (Active)     Topic: Occupational Therapy (Active)     Point: ADL training (Active)     Description: Instruct learner(s) on proper safety adaptation and remediation techniques during self care or transfers.   Instruct in proper use of assistive devices.   Learning Progress Summary     Learner Status Readiness Method Response Comment Documented by    Patient Active Acceptance E NR   06/12/18 1111     Done Acceptance E VU   06/08/18 1124     Done Acceptance E VU,NR Education initiated for benefits of OOB activity/therapy, role of OT and recommendation for RW and shower chair for home.  06/08/18 1118    Family Active Acceptance E NR   06/12/18 1111                      User Key     Initials Effective Dates Name Provider Type Discipline     06/08/18 -  Brigitte Witt, OT Occupational Therapist OT     03/14/16 -  Christina Patel OT Occupational Therapist OT     06/23/17 -  Natalya Aguilar RN Registered Nurse Nurse                OT Recommendation and Plan  Outcome Summary/Treatment Plan (OT)  Daily Summary of Progress (OT): progress toward functional goals is good  Daily Summary of Progress (OT): progress toward functional goals is good  Plan of Care Review  Plan of Care Reviewed With: patient, spouse, daughter  Plan of Care Reviewed With: patient, spouse, daughter  Outcome Summary: Pt demonstrated  improved strength and balance this date.  She met her functional mobility goal and partially met her transfer goal.  Pt with good effort and excellent support system.  She continues to require cues for safety.  Cont OT POC         Outcome Measures     Row Name 06/12/18 1034 06/11/18 0833 06/09/18 1327       How much help from another person do you currently need...    Turning from your back to your side while in flat bed without using bedrails?  -- 3  -KR 3  -LM    Moving from lying on back to sitting on the side of a flat bed without bedrails?  -- 3  -KR 3  -LM    Moving to and from a bed to a chair (including a wheelchair)?  -- 3  -KR 3  -LM    Standing up from a chair using your arms (e.g., wheelchair, bedside chair)?  -- 3  -KR 3  -LM    Climbing 3-5 steps with a railing?  -- 2  -KR 3  -LM    To walk in hospital room?  -- 3  -KR 3  -LM    AM-PAC 6 Clicks Score  -- 17  -KR 18  -LM       How much help from another is currently needed...    Putting on and taking off regular lower body clothing? 2  -MC  --  --    Bathing (including washing, rinsing, and drying) 2  -MC  --  --    Toileting (which includes using toilet bed pan or urinal) 2  -MC  --  --    Putting on and taking off regular upper body clothing 3  -MC  --  --    Taking care of personal grooming (such as brushing teeth) 3  -MC  --  --    Eating meals 3  -MC  --  --    Score 15  -MC  --  --       Functional Assessment    Outcome Measure Options AM-PAC 6 Clicks Daily Activity (OT)  -MC AM-PAC 6 Clicks Basic Mobility (PT)  -KR AM-PAC 6 Clicks Basic Mobility (PT)  -LM      User Key  (r) = Recorded By, (t) = Taken By, (c) = Cosigned By    Initials Name Provider Type    LM Jaz Arizmendi, PT Physical Therapist    JENNIFER Patel, OT Occupational Therapist    BRE Shukla, PT Physical Therapist           Time Calculation:         Time Calculation- OT     Row Name 06/12/18 1034             Time Calculation- OT    OT Start Time 1034  -         Timed Charges     89877 - OT Therapeutic Exercise Minutes 2  -      87089 - OT Therapeutic Activity Minutes 12  -      55203 - OT Self Care/Mgmt Minutes 10  -        User Key  (r) = Recorded By, (t) = Taken By, (c) = Cosigned By    Initials Name Provider Type     Christina Patel OT Occupational Therapist           Therapy Suggested Charges     Code   Minutes Charges    53491 (CPT®) Hc Ot Neuromusc Re Education Ea 15 Min      79697 (CPT®) Hc Ot Ther Proc Ea 15 Min 2     68170 (CPT®) Hc Gait Training Ea 15 Min      35842 (CPT®) Hc Ot Therapeutic Act Ea 15 Min 12 1    34502 (CPT®) Hc Ot Manual Therapy Ea 15 Min      38970 (CPT®) Hc Ot Iontophoresis Ea 15 Min      92025 (CPT®) Hc Ot Elec Stim Ea-Per 15 Min      87828 (CPT®) Hc Ot Ultrasound Ea 15 Min      09463 (CPT®) Hc Ot Self Care/Mgmt/Train Ea 15 Min 10 1    Total  24 2        Therapy Charges for Today     Code Description Service Date Service Provider Modifiers Qty    70519542521 HC OT THERAPEUTIC ACT EA 15 MIN 6/12/2018 Christina Patel OT GO 1    18041977827 HC OT SELF CARE/MGMT/TRAIN EA 15 MIN 6/12/2018 Christina Patel OT GO 1               Christina Patel OT  6/12/2018

## 2018-06-12 NOTE — PROGRESS NOTES
Continued Stay Note  [facility]     Patient Name: Eliane Zamora  MRN: [id number]  Today's Date: 6/11/2018    Admit Date: 6/7/2018    Consent obtained for the participation in the [facility] Transitions Program. Lian Lin RN        Discharge Plan    No documentation.             Discharge Codes    No documentation.       Expected Discharge Date and Time     Expected Discharge Date Expected Discharge Time    Jun 13, 2018             Lian Lin RN

## 2018-06-12 NOTE — PLAN OF CARE
Problem: Fall Risk (Adult)  Goal: Identify Related Risk Factors and Signs and Symptoms  Outcome: Ongoing (interventions implemented as appropriate)    Goal: Absence of Fall  Outcome: Ongoing (interventions implemented as appropriate)      Problem: Skin Injury Risk (Adult)  Goal: Identify Related Risk Factors and Signs and Symptoms  Outcome: Ongoing (interventions implemented as appropriate)    Goal: Skin Health and Integrity  Outcome: Ongoing (interventions implemented as appropriate)      Problem: Infection, Risk/Actual (Adult)  Goal: Identify Related Risk Factors and Signs and Symptoms  Outcome: Ongoing (interventions implemented as appropriate)    Goal: Infection Prevention/Resolution  Outcome: Ongoing (interventions implemented as appropriate)      Problem: Patient Care Overview  Goal: Individualization and Mutuality  Outcome: Ongoing (interventions implemented as appropriate)    Goal: Discharge Needs Assessment  Outcome: Ongoing (interventions implemented as appropriate)    Goal: Interprofessional Rounds/Family Conf  Outcome: Ongoing (interventions implemented as appropriate)

## 2018-06-12 NOTE — PROGRESS NOTES
[facility] Medicine Services  PROGRESS NOTE    Patient Name: Eliane Zamora  : 1929  MRN: [id number]    Date of Admission: 2018  Length of Stay: 5  Primary Care Physician: Jack Chilel MD    Subjective   Subjective     CC:  F/u fall, PNA    HPI:  Sitting up in bed without any new issues or concerns.   at bedside.  Waiting placement.  Pleasantly confused.      Review of Systems  Gen- No fevers, chills  CV- No chest pain, palpitations  Resp- No cough, dyspnea  GI- No N/V/D, abd pain    Objective   Objective     Vital Signs:   Temp:  [97.6 °F (36.4 °C)-98.3 °F (36.8 °C)] 97.6 °F (36.4 °C)  Heart Rate:  [64-66] 66  Resp:  [18] 18  BP: (122-157)/(61-70) 122/66        Physical Exam:  Constitutional: age appropriate, lying in bed, no distress  HENT: NCAT, mucous membranes moist  Respiratory: some crackles right base, respiratory effort normal   Cardiovascular: RRR, no murmurs, rubs, or gallops   Gastrointestinal: Positive bowel sounds, soft, nontender, nondistended  Musculoskeletal: No bilateral ankle edema  Psychiatric: Appropriate affect, cooperative  Neurologic: Mild confusion, no focal deficits  Skin: No rashes    Results Reviewed:  I have personally reviewed current lab, radiology, and data and agree.      Results from last 7 days  Lab Units 18  0714 18  0751   WBC 10*3/mm3 11.67* 12.34*   HEMOGLOBIN g/dL 10.8* 12.2   HEMATOCRIT % 34.4* 38.1   PLATELETS 10*3/mm3 232 269       Results from last 7 days  Lab Units 18  0635 18  0714 18  0751   SODIUM mmol/L 137 140 137   POTASSIUM mmol/L 3.8 3.9 3.7   CHLORIDE mmol/L 105 109 102   CO2 mmol/L 26.0 28.0 28.0   BUN mg/dL 12 13 22   CREATININE mg/dL 0.87 1.00 1.23   GLUCOSE mg/dL 83 81 106*   CALCIUM mg/dL 7.3* 7.5* 8.1*   ALT (SGPT) U/L  --   --  23   AST (SGOT) U/L  --   --  29   TROPONIN I ng/mL  --   --  0.021     Estimated Creatinine Clearance: 32.5 mL/min (by C-G formula based on SCr of  0.87 mg/dL).  No results found for: BNP  No results found for: PHART    Microbiology Results Abnormal     Procedure Component Value - Date/Time    Blood Culture - Blood, [[id number]]  (Normal) Collected:  06/07/18 0750    Lab Status:  Final result Specimen:  Blood from Arm, Right Updated:  06/12/18 0815     Blood Culture No growth at 5 days    Blood Culture - Blood, [[id number]]  (Normal) Collected:  06/07/18 0755    Lab Status:  Final result Specimen:  Blood from Arm, Left Updated:  06/12/18 0815     Blood Culture No growth at 5 days    Legionella Antigen, Urine - Urine, Urine, Clean Catch [[id number]] Collected:  06/07/18 1713    Lab Status:  Final result Specimen:  Urine from Urine, Clean Catch Updated:  06/11/18 1309     L. pneumophila Serogp 1 Ur Ag Negative     Comment: Presumptive negative for L. pneumophila serogroup 1 antigen in urine,  suggesting no recent or current infection. Legionnaires' disease  cannot be ruled out since other serogroups and species may also cause  disease.       Narrative:       Performed at:  [address] [place] [address]  [id number]  : Davonte Trujillo MD, Phone:  [phone]    Urine Culture - Urine, [[id number]]  (Abnormal)  (Susceptibility) Collected:  06/07/18 0747    Lab Status:  Final result Specimen:  Urine from Urine, Catheter Updated:  06/09/18 1109     Urine Culture >100,000 CFU/mL Acinetobacter baumannii complex / haemolyticus (A)    Susceptibility      Acinetobacter baumannii complex / haemolyticus     NIHARIKA     Ampicillin + Sulbactam >16/8 ug/ml Resistant     Cefepime <=8 ug/ml Susceptible     Cefotaxime 16 ug/ml Intermediate     Ceftazidime 4 ug/ml Susceptible     Ceftriaxone >32 ug/ml Resistant     Gentamicin <=4 ug/ml Susceptible     Levofloxacin <=2 ug/ml Susceptible     Meropenem <=1 ug/ml Susceptible     Tetracycline >8 ug/ml Resistant     Tobramycin <=4 ug/ml Susceptible     Trimethoprim + Sulfamethoxazole >2/38 ug/ml Resistant                           Imaging Results (last 24 hours)     ** No results found for the last 24 hours. **        Results for orders placed during the hospital encounter of 06/07/18   Adult Transthoracic Echo Complete W/ Cont if Necessary Per Protocol    Narrative · Left ventricular systolic function is normal.  · Estimated EF appears to be in the range of 66 - 70%.  · Left atrial cavity size is mildly dilated.          I have reviewed the medications.    Assessment/Plan   Assessment / Plan     Hospital Problem List     * (Principal)Sepsis    PAF (paroxysmal atrial fibrillation)    Overview Addendum 11/16/2016 10:33 AM by Josefina Luis     1. Paroxysmal atrial fibrillation:  a. Rhythm control with sotalol.  b. Excessive bradyarrhythmias associated with digoxin.     Her Holter monitor showed essentially sinus rhythm with no excessive bradycardia and no excessive tachycardia.  Her EKG today shows sinus rhythm, corrected QT interval of 418 milliseconds. 05/07/15         Hypothyroidism    Overview Signed 11/16/2016 10:32 AM by Josefina Luis     On chronic supplementation         Hypercholesterolemia    UTI (urinary tract infection)    Right lower lobe pneumonia    Dementia             Brief Hospital Course to date:  Eliane Zamora is a 88 y.o. female presented to ED after fall during night at home.   unable to get up, so brought to ED.  Found to have temp to 103, UTI, and significant RLL PNA.      Assessment & Plan:  - Remains clinically improved from admission.    Reviewed CXR and CT, no evidence of mass on CT, all PNA.  Passed SLP eval.  Given resistant UTI, changed rocephin to cefipime for CAP coverage, continue doxy.  Complete 7 day course  - urine culture growing some resistant acinetobacter.  Changed abx to cefepime.  - resumed amio and dilt, no AC per last cardiology office note  - mental status is at baseline per .  Reviewed therapy notes, patient is mod assist.   - family now agreeable to SNF for  some rehab.  CM following    DVT Prophylaxis:  lovenox    CODE STATUS: Full Code    Disposition: I expect the patient to be discharged to SNF when arrangements can be made.  CM following.      Electronically signed by DEEJAY Chicas, 06/12/18, 1:39 PM.

## 2018-06-13 VITALS
SYSTOLIC BLOOD PRESSURE: 122 MMHG | WEIGHT: 101.4 LBS | OXYGEN SATURATION: 96 % | BODY MASS INDEX: 19.91 KG/M2 | DIASTOLIC BLOOD PRESSURE: 54 MMHG | TEMPERATURE: 98.2 F | RESPIRATION RATE: 16 BRPM | HEIGHT: 60 IN | HEART RATE: 64 BPM

## 2018-06-13 PROBLEM — N39.0 UTI (URINARY TRACT INFECTION): Status: RESOLVED | Noted: 2018-06-08 | Resolved: 2018-06-13

## 2018-06-13 PROBLEM — A41.9 SEPSIS (HCC): Status: RESOLVED | Noted: 2018-06-07 | Resolved: 2018-06-13

## 2018-06-13 PROCEDURE — 99239 HOSP IP/OBS DSCHRG MGMT >30: CPT | Performed by: NURSE PRACTITIONER

## 2018-06-13 PROCEDURE — 25010000002 CEFEPIME: Performed by: INTERNAL MEDICINE

## 2018-06-13 RX ORDER — DOXYCYCLINE 100 MG/1
100 CAPSULE ORAL EVERY 12 HOURS SCHEDULED
Qty: 1 CAPSULE | Refills: 0
Start: 2018-06-13 | End: 2018-06-15

## 2018-06-13 RX ORDER — SACCHAROMYCES BOULARDII 250 MG
250 CAPSULE ORAL 2 TIMES DAILY
Qty: 14 CAPSULE | Refills: 0
Start: 2018-06-13 | End: 2018-06-20

## 2018-06-13 RX ADMIN — DOXYCYCLINE 100 MG: 100 CAPSULE ORAL at 08:37

## 2018-06-13 RX ADMIN — CEFEPIME 2 G: 2 INJECTION, POWDER, FOR SOLUTION INTRAVENOUS at 05:10

## 2018-06-13 RX ADMIN — AMIODARONE HYDROCHLORIDE 200 MG: 200 TABLET ORAL at 08:37

## 2018-06-13 RX ADMIN — PANTOPRAZOLE SODIUM 40 MG: 40 TABLET, DELAYED RELEASE ORAL at 05:10

## 2018-06-13 RX ADMIN — Medication 250 MG: at 08:37

## 2018-06-13 RX ADMIN — DILTIAZEM HYDROCHLORIDE 120 MG: 120 CAPSULE, EXTENDED RELEASE ORAL at 08:37

## 2018-06-13 RX ADMIN — LEVOTHYROXINE SODIUM 75 MCG: 75 TABLET ORAL at 08:37

## 2018-06-13 NOTE — PROGRESS NOTES
Continued Stay Note  [facility]     Patient Name: Eliane Zamora  MRN: [id number]  Today's Date: 6/13/2018    Admit Date: 6/7/2018          Discharge Plan     Row Name 06/13/18 1034       Plan    Plan update    Patient/Family in Agreement with Plan yes    Plan Comments Called Sarah with [facility] who does not have an answer as of yet regarding acceptance and bed availability but will call back soon to advise.  CM following.      Final Discharge Disposition Code 03 - skilled nursing facility (SNF)              Discharge Codes    No documentation.       Expected Discharge Date and Time     Expected Discharge Date Expected Discharge Time    Jun 13, 2018             Esperanza Roman RN

## 2018-06-13 NOTE — DISCHARGE INSTR - APPOINTMENTS
Please call to schedule an appointment to be seen by your PCP one week after discharge from [facility].   Jack Chilel MD  PCP - General Family Medicine [phone] [address] [address]

## 2018-06-13 NOTE — PROGRESS NOTES
Case Management Discharge Note    Final Note: Received a call from Sarah with [facility] who can offer patient a bed today.  APRN notified.  Called patient daughter/POA, Monika Lane, of bed offer who is agreeable and has accepted bed.  Notified RN of patient transfer today.  Patient plan is to discharge today to [facility] via car with family to transport.  Nursing to call report to [phone].    Destination     Service Request Status Selected Specialties Address Phone Number Fax Number    [facility] [place] Pending - No Request Sent N/A [address], [place] [id number] [phone]    [facility] Pending - No Request Sent N/A [address], [place] [id number] [phone]    [facility] Pending - No Request Sent N/A [address], [facility] [address] [phone]    [facility] Declined N/A [address], [place] [id number] [phone]      Durable Medical Equipment     No service coordination in this encounter.      Dialysis/Infusion     No service coordination in this encounter.      Home Medical Care     No service coordination in this encounter.      Social Care     No service coordination in this encounter.             Final Discharge Disposition Code: 03 - skilled nursing facility (SNF)

## 2018-06-13 NOTE — SIGNIFICANT NOTE
D/C transfer packet provided to pt daughter to take with them to [facility]. Report called to LPN at [facility] who is expecting pt. No s/sx distress. Pt belongings taken with her from bedside. Transported out to car via w/c and transport.

## 2018-06-13 NOTE — PROGRESS NOTES
Case Management Discharge Note         Destination     Service Request Status Selected Specialties Address Phone Number Fax Number    [facility] [place] Pending - No Request Sent N/A [address], [facility] [address] [phone]    [facility] Pending - No Request Sent N/A [address], [facility] [address] [phone]    [facility] Pending - No Request Sent N/A [address], [facility] [address] [phone]    [facility] Declined N/A [address], [place] [id number] [phone]      Durable Medical Equipment     No service coordination in this encounter.      Dialysis/Infusion     No service coordination in this encounter.      Home Medical Care     No service coordination in this encounter.      Social Care     No service coordination in this encounter.             Final Discharge Disposition Code: 03 - skilled nursing facility (SNF)Case Management Discharge Note         Destination     Service Request Status Selected Specialties Address Phone Number Fax Number    [facility] [place] Pending - No Request Sent N/A [address], [facility] [address] [phone]    [facility] Pending - No Request Sent N/A [address], [facility] [address] [phone]    [facility] Pending - No Request Sent N/A [address], [facility] [address] [phone]    [facility] Declined N/A [address] [facility] [address] [phone]      Durable Medical Equipment     No service coordination in this encounter.      Dialysis/Infusion     No service coordination in this encounter.      Home Medical Care     No service coordination in this encounter.      Social Care     No service coordination in this encounter.             Final Discharge Disposition Code: 03 - skilled nursing  facility (SNF)

## 2018-06-13 NOTE — PLAN OF CARE
Problem: Fall Risk (Adult)  Goal: Identify Related Risk Factors and Signs and Symptoms  Outcome: Outcome(s) achieved Date Met: 06/13/18    Goal: Absence of Fall  Outcome: Outcome(s) achieved Date Met: 06/13/18      Problem: Skin Injury Risk (Adult)  Goal: Identify Related Risk Factors and Signs and Symptoms  Outcome: Outcome(s) achieved Date Met: 06/13/18    Goal: Skin Health and Integrity  Outcome: Outcome(s) achieved Date Met: 06/13/18      Problem: Infection, Risk/Actual (Adult)  Goal: Identify Related Risk Factors and Signs and Symptoms  Outcome: Outcome(s) achieved Date Met: 06/13/18    Goal: Infection Prevention/Resolution  Outcome: Outcome(s) achieved Date Met: 06/13/18      Problem: Patient Care Overview  Goal: Individualization and Mutuality  Outcome: Outcome(s) achieved Date Met: 06/13/18    Goal: Discharge Needs Assessment  Outcome: Outcome(s) achieved Date Met: 06/13/18    Goal: Interprofessional Rounds/Family Conf  Outcome: Outcome(s) achieved Date Met: 06/13/18

## 2018-07-20 ENCOUNTER — HOSPITAL ENCOUNTER (OUTPATIENT)
Dept: GENERAL RADIOLOGY | Facility: HOSPITAL | Age: 83
Discharge: HOME | End: 2018-07-20
Attending: FAMILY MEDICINE | Admitting: FAMILY MEDICINE

## 2018-07-20 ENCOUNTER — TRANSCRIBE ORDERS (OUTPATIENT)
Dept: ADMINISTRATIVE | Facility: HOSPITAL | Age: 83
End: 2018-07-20

## 2018-07-20 DIAGNOSIS — R93.89 ABNORMAL CHEST X-RAY: Primary | ICD-10-CM

## 2018-07-20 DIAGNOSIS — R93.89 ABNORMAL CHEST X-RAY: ICD-10-CM

## 2018-07-20 PROCEDURE — 71046 X-RAY EXAM CHEST 2 VIEWS: CPT

## 2018-08-18 ENCOUNTER — APPOINTMENT (OUTPATIENT)
Dept: GENERAL RADIOLOGY | Facility: HOSPITAL | Age: 83
End: 2018-08-18

## 2018-08-18 ENCOUNTER — HOSPITAL ENCOUNTER (INPATIENT)
Facility: HOSPITAL | Age: 83
LOS: 3 days | Discharge: NURSING, REHAB OR LONG-TERM CARE | End: 2018-08-23
Attending: EMERGENCY MEDICINE | Admitting: INTERNAL MEDICINE

## 2018-08-18 DIAGNOSIS — Z74.09 IMPAIRED MOBILITY AND ADLS: ICD-10-CM

## 2018-08-18 DIAGNOSIS — Z74.09 IMPAIRED FUNCTIONAL MOBILITY, BALANCE, GAIT, AND ENDURANCE: ICD-10-CM

## 2018-08-18 DIAGNOSIS — Z78.9 IMPAIRED MOBILITY AND ADLS: ICD-10-CM

## 2018-08-18 DIAGNOSIS — E86.0 DEHYDRATION: ICD-10-CM

## 2018-08-18 DIAGNOSIS — R53.1 WEAKNESS: Primary | ICD-10-CM

## 2018-08-18 DIAGNOSIS — F03.90 DEMENTIA WITHOUT BEHAVIORAL DISTURBANCE, UNSPECIFIED DEMENTIA TYPE: ICD-10-CM

## 2018-08-18 PROBLEM — D72.829 LEUKOCYTOSIS: Status: ACTIVE | Noted: 2018-08-18

## 2018-08-18 PROBLEM — E16.2 HYPOGLYCEMIA: Status: ACTIVE | Noted: 2018-08-18

## 2018-08-18 PROBLEM — E87.1 HYPONATREMIA: Status: ACTIVE | Noted: 2018-08-18

## 2018-08-18 PROBLEM — R62.7 FAILURE TO THRIVE IN ADULT: Status: ACTIVE | Noted: 2018-08-18

## 2018-08-18 LAB
ALBUMIN SERPL-MCNC: 3.43 G/DL (ref 3.2–4.8)
ALBUMIN/GLOB SERPL: 1.3 G/DL (ref 1.5–2.5)
ALP SERPL-CCNC: 86 U/L (ref 25–100)
ALT SERPL W P-5'-P-CCNC: 28 U/L (ref 7–40)
ANION GAP SERPL CALCULATED.3IONS-SCNC: 9 MMOL/L (ref 3–11)
AST SERPL-CCNC: 25 U/L (ref 0–33)
BACTERIA UR QL AUTO: ABNORMAL /HPF
BASOPHILS # BLD AUTO: 0.01 10*3/MM3 (ref 0–0.2)
BASOPHILS NFR BLD AUTO: 0.1 % (ref 0–1)
BILIRUB SERPL-MCNC: 1.1 MG/DL (ref 0.3–1.2)
BILIRUB UR QL STRIP: NEGATIVE
BILIRUB UR QL STRIP: NEGATIVE
BUN BLD-MCNC: 18 MG/DL (ref 9–23)
BUN/CREAT SERPL: 16.8 (ref 7–25)
CALCIUM SPEC-SCNC: 8.3 MG/DL (ref 8.7–10.4)
CHLORIDE SERPL-SCNC: 98 MMOL/L (ref 99–109)
CK SERPL-CCNC: 61 U/L (ref 26–174)
CLARITY UR: CLEAR
CLARITY UR: CLEAR
CO2 SERPL-SCNC: 24 MMOL/L (ref 20–31)
COLOR UR: YELLOW
COLOR UR: YELLOW
CREAT BLD-MCNC: 1.07 MG/DL (ref 0.6–1.3)
DEPRECATED RDW RBC AUTO: 52.4 FL (ref 37–54)
EOSINOPHIL # BLD AUTO: 0.05 10*3/MM3 (ref 0–0.3)
EOSINOPHIL NFR BLD AUTO: 0.3 % (ref 0–3)
ERYTHROCYTE [DISTWIDTH] IN BLOOD BY AUTOMATED COUNT: 16.6 % (ref 11.3–14.5)
GFR SERPL CREATININE-BSD FRML MDRD: 48 ML/MIN/1.73
GLOBULIN UR ELPH-MCNC: 2.6 GM/DL
GLUCOSE BLD-MCNC: 85 MG/DL (ref 70–100)
GLUCOSE UR STRIP-MCNC: NEGATIVE MG/DL
GLUCOSE UR STRIP-MCNC: NEGATIVE MG/DL
HCT VFR BLD AUTO: 35.9 % (ref 34.5–44)
HGB BLD-MCNC: 12 G/DL (ref 11.5–15.5)
HGB UR QL STRIP.AUTO: NEGATIVE
HGB UR QL STRIP.AUTO: NEGATIVE
HOLD SPECIMEN: NORMAL
HOLD SPECIMEN: NORMAL
HYALINE CASTS UR QL AUTO: ABNORMAL /LPF
IMM GRANULOCYTES # BLD: 0.08 10*3/MM3 (ref 0–0.03)
IMM GRANULOCYTES NFR BLD: 0.5 % (ref 0–0.6)
KETONES UR QL STRIP: ABNORMAL
KETONES UR QL STRIP: NEGATIVE
LEUKOCYTE ESTERASE UR QL STRIP.AUTO: ABNORMAL
LEUKOCYTE ESTERASE UR QL STRIP.AUTO: NEGATIVE
LYMPHOCYTES # BLD AUTO: 0.76 10*3/MM3 (ref 0.6–4.8)
LYMPHOCYTES NFR BLD AUTO: 5 % (ref 24–44)
MAGNESIUM SERPL-MCNC: 2.4 MG/DL (ref 1.3–2.7)
MCH RBC QN AUTO: 29.6 PG (ref 27–31)
MCHC RBC AUTO-ENTMCNC: 33.4 G/DL (ref 32–36)
MCV RBC AUTO: 88.6 FL (ref 80–99)
MONOCYTES # BLD AUTO: 0.5 10*3/MM3 (ref 0–1)
MONOCYTES NFR BLD AUTO: 3.3 % (ref 0–12)
NEUTROPHILS # BLD AUTO: 13.99 10*3/MM3 (ref 1.5–8.3)
NEUTROPHILS NFR BLD AUTO: 91.3 % (ref 41–71)
NITRITE UR QL STRIP: NEGATIVE
NITRITE UR QL STRIP: NEGATIVE
PH UR STRIP.AUTO: 8 [PH] (ref 5–8)
PH UR STRIP.AUTO: 8 [PH] (ref 5–8)
PLATELET # BLD AUTO: 345 10*3/MM3 (ref 150–450)
PMV BLD AUTO: 9.1 FL (ref 6–12)
POTASSIUM BLD-SCNC: 4.7 MMOL/L (ref 3.5–5.5)
PROT SERPL-MCNC: 6 G/DL (ref 5.7–8.2)
PROT UR QL STRIP: NEGATIVE
PROT UR QL STRIP: NEGATIVE
RBC # BLD AUTO: 4.05 10*6/MM3 (ref 3.89–5.14)
RBC # UR: ABNORMAL /HPF
REF LAB TEST METHOD: ABNORMAL
SODIUM BLD-SCNC: 131 MMOL/L (ref 132–146)
SP GR UR STRIP: 1.02 (ref 1–1.03)
SP GR UR STRIP: 1.02 (ref 1–1.03)
SQUAMOUS #/AREA URNS HPF: ABNORMAL /HPF
TROPONIN I SERPL-MCNC: 0 NG/ML (ref 0–0.07)
TSH SERPL DL<=0.05 MIU/L-ACNC: 18.27 MIU/ML (ref 0.35–5.35)
UROBILINOGEN UR QL STRIP: ABNORMAL
UROBILINOGEN UR QL STRIP: NORMAL
WBC NRBC COR # BLD: 15.31 10*3/MM3 (ref 3.5–10.8)
WBC UR QL AUTO: ABNORMAL /HPF
WHOLE BLOOD HOLD SPECIMEN: NORMAL
WHOLE BLOOD HOLD SPECIMEN: NORMAL

## 2018-08-18 PROCEDURE — 80053 COMPREHEN METABOLIC PANEL: CPT | Performed by: EMERGENCY MEDICINE

## 2018-08-18 PROCEDURE — 99285 EMERGENCY DEPT VISIT HI MDM: CPT

## 2018-08-18 PROCEDURE — P9612 CATHETERIZE FOR URINE SPEC: HCPCS

## 2018-08-18 PROCEDURE — 99220 PR INITIAL OBSERVATION CARE/DAY 70 MINUTES: CPT | Performed by: INTERNAL MEDICINE

## 2018-08-18 PROCEDURE — 71045 X-RAY EXAM CHEST 1 VIEW: CPT

## 2018-08-18 PROCEDURE — 82550 ASSAY OF CK (CPK): CPT | Performed by: INTERNAL MEDICINE

## 2018-08-18 PROCEDURE — 84484 ASSAY OF TROPONIN QUANT: CPT

## 2018-08-18 PROCEDURE — 84443 ASSAY THYROID STIM HORMONE: CPT | Performed by: INTERNAL MEDICINE

## 2018-08-18 PROCEDURE — 81001 URINALYSIS AUTO W/SCOPE: CPT | Performed by: INTERNAL MEDICINE

## 2018-08-18 PROCEDURE — 81003 URINALYSIS AUTO W/O SCOPE: CPT | Performed by: EMERGENCY MEDICINE

## 2018-08-18 PROCEDURE — 83735 ASSAY OF MAGNESIUM: CPT | Performed by: EMERGENCY MEDICINE

## 2018-08-18 PROCEDURE — 85025 COMPLETE CBC W/AUTO DIFF WBC: CPT | Performed by: EMERGENCY MEDICINE

## 2018-08-18 PROCEDURE — 93005 ELECTROCARDIOGRAM TRACING: CPT | Performed by: EMERGENCY MEDICINE

## 2018-08-18 PROCEDURE — 25010000002 HEPARIN (PORCINE) PER 1000 UNITS: Performed by: INTERNAL MEDICINE

## 2018-08-18 PROCEDURE — G0378 HOSPITAL OBSERVATION PER HR: HCPCS

## 2018-08-18 RX ORDER — MEGESTROL ACETATE 40 MG/ML
400 SUSPENSION ORAL DAILY
COMMUNITY

## 2018-08-18 RX ORDER — DEXTROSE AND SODIUM CHLORIDE 5; .45 G/100ML; G/100ML
100 INJECTION, SOLUTION INTRAVENOUS CONTINUOUS
Status: DISCONTINUED | OUTPATIENT
Start: 2018-08-18 | End: 2018-08-19

## 2018-08-18 RX ORDER — SODIUM CHLORIDE 0.9 % (FLUSH) 0.9 %
1-10 SYRINGE (ML) INJECTION AS NEEDED
Status: DISCONTINUED | OUTPATIENT
Start: 2018-08-18 | End: 2018-08-23 | Stop reason: HOSPADM

## 2018-08-18 RX ORDER — LEVOTHYROXINE SODIUM 0.07 MG/1
75 TABLET ORAL DAILY
Status: DISCONTINUED | OUTPATIENT
Start: 2018-08-18 | End: 2018-08-23 | Stop reason: HOSPADM

## 2018-08-18 RX ORDER — HEPARIN SODIUM 5000 [USP'U]/ML
5000 INJECTION, SOLUTION INTRAVENOUS; SUBCUTANEOUS EVERY 8 HOURS SCHEDULED
Status: DISCONTINUED | OUTPATIENT
Start: 2018-08-18 | End: 2018-08-23 | Stop reason: HOSPADM

## 2018-08-18 RX ORDER — QUETIAPINE FUMARATE 25 MG/1
6.25 TABLET, FILM COATED ORAL NIGHTLY
Status: DISCONTINUED | OUTPATIENT
Start: 2018-08-18 | End: 2018-08-23 | Stop reason: HOSPADM

## 2018-08-18 RX ORDER — PANTOPRAZOLE SODIUM 40 MG/1
40 TABLET, DELAYED RELEASE ORAL DAILY
Status: DISCONTINUED | OUTPATIENT
Start: 2018-08-18 | End: 2018-08-23 | Stop reason: HOSPADM

## 2018-08-18 RX ORDER — DILTIAZEM HYDROCHLORIDE 120 MG/1
120 CAPSULE, COATED, EXTENDED RELEASE ORAL
Status: DISCONTINUED | OUTPATIENT
Start: 2018-08-18 | End: 2018-08-23 | Stop reason: HOSPADM

## 2018-08-18 RX ORDER — SODIUM CHLORIDE 0.9 % (FLUSH) 0.9 %
10 SYRINGE (ML) INJECTION AS NEEDED
Status: DISCONTINUED | OUTPATIENT
Start: 2018-08-18 | End: 2018-08-23 | Stop reason: HOSPADM

## 2018-08-18 RX ADMIN — LEVOTHYROXINE SODIUM 75 MCG: 75 TABLET ORAL at 15:17

## 2018-08-18 RX ADMIN — PANTOPRAZOLE SODIUM 40 MG: 40 TABLET, DELAYED RELEASE ORAL at 15:17

## 2018-08-18 RX ADMIN — DILTIAZEM HYDROCHLORIDE 120 MG: 120 CAPSULE, EXTENDED RELEASE ORAL at 15:17

## 2018-08-18 RX ADMIN — QUETIAPINE FUMARATE 6.25 MG: 25 TABLET ORAL at 20:02

## 2018-08-18 RX ADMIN — DEXTROSE AND SODIUM CHLORIDE 100 ML/HR: 5; 450 INJECTION, SOLUTION INTRAVENOUS at 15:06

## 2018-08-18 RX ADMIN — HEPARIN SODIUM 5000 UNITS: 5000 INJECTION, SOLUTION INTRAVENOUS; SUBCUTANEOUS at 20:02

## 2018-08-18 RX ADMIN — HEPARIN SODIUM 5000 UNITS: 5000 INJECTION, SOLUTION INTRAVENOUS; SUBCUTANEOUS at 15:06

## 2018-08-18 NOTE — ED PROVIDER NOTES
Subjective   Ms. Eliane Zamora is a 89 y.o. female who presents to the ED for evaluation of altered mental status. She lives at home with her  and has reportedly been sleeping on the floor for the past 3 days because when she gets out of bed she is unable to get back into bed so she lies on the floor. Patient's  does not believe she suffered a fall. Her daughter reports she received a call from the patient's  this morning that she was on the floor and was unable to get up. She went to their house and called EMS. She notes the patient was soaked with urination even through the Depends she was wearing. EMS note her glucose level was 79. Her  states she was put in the bed at 0830 yesterday and got back on the floor sometime after that. She has reportedly been lying in bed for long periods of time throughout the day recently. She denies any pain. She had a left shoulder fracture in March 2018 and was admitted for pneumonia and UTI in June 2018. She has a history of dementia and is reportedly only oriented to person at baseline. She also has a history of a-fib and GERD. There are no other acute symptoms at this time.        History provided by:  Patient  Altered Mental Status   Presenting symptoms: behavior changes    Severity:  Moderate  Most recent episode:  Today  Episode history:  Multiple  Duration:  3 days  Timing:  Constant  Progression:  Unchanged  Chronicity:  New  Context: dementia    Associated symptoms: no abdominal pain        Review of Systems   Cardiovascular: Negative for chest pain.   Gastrointestinal: Negative for abdominal pain.   Musculoskeletal: Negative for back pain and neck pain.   All other systems reviewed and are negative.      Past Medical History:   Diagnosis Date   • Abnormal stress echo     History of abnormal stress echocardiogram with recent echocardiogram showing borderline LV function.    • Dementia    • GERD (gastroesophageal reflux disease)    •  Hypercholesterolemia    • Hypothyroidism    • Osteoporosis     /Mild degenerative joint disease   • PAF (paroxysmal atrial fibrillation) (CMS/HCC)    • UTI (urinary tract infection)     Current urinary tract infection by laboratory value, 2013:  Less than 100,000 units/mL of E. coli.       Allergies   Allergen Reactions   • Codeine      Severe nausea and vomiting       Past Surgical History:   Procedure Laterality Date   • BREAST SURGERY      benign breast biopsy   •  SECTION         History reviewed. No pertinent family history.    Social History     Social History   • Marital status:      Social History Main Topics   • Smoking status: Never Smoker   • Smokeless tobacco: Never Used   • Alcohol use 0.6 oz/week     1 Glasses of wine per week      Comment: occasionally   • Drug use: No   • Sexual activity: Defer     Other Topics Concern   • Not on file         Objective   Physical Exam   Constitutional: She appears well-developed and well-nourished. No distress.   HENT:   Head: Normocephalic and atraumatic.   Nose: Nose normal.   Eyes: Conjunctivae are normal. No scleral icterus.   Neck: Normal range of motion.   Cardiovascular: Normal rate, regular rhythm and normal heart sounds.    Pulmonary/Chest: Effort normal. No accessory muscle usage. She has no wheezes. She has rhonchi (mild scattered).   Abdominal: Soft. There is no tenderness.   Musculoskeletal: Normal range of motion. She exhibits no tenderness or deformity.   Neurological: She is alert.   Moves all four extremities weakly but equally. No pronator drift. Oriented to first and last name only. Disoriented to place and time as well as the reason why she is here in the ED, she believes she spent last night at home in her bed.   Skin: Skin is warm and dry.   Psychiatric: She has a normal mood and affect. Her behavior is normal.   Nursing note and vitals reviewed.      Procedures         ED Course  ED Course as of Aug 21 1157   Sat Aug 18,  2018   1220 Hospitalist paged  [JW]   3332 Dr. Diaz has discussed the case with Dr. English, hospitalist, who will admit the patient to telemetry.  [JW]      ED Course User Index  [JW] JeremiasStef EMILY     Recent Results (from the past 24 hour(s))   Comprehensive Metabolic Panel    Collection Time: 08/18/18  9:45 AM   Result Value Ref Range    Glucose 85 70 - 100 mg/dL    BUN 18 9 - 23 mg/dL    Creatinine 1.07 0.60 - 1.30 mg/dL    Sodium 131 (L) 132 - 146 mmol/L    Potassium 4.7 3.5 - 5.5 mmol/L    Chloride 98 (L) 99 - 109 mmol/L    CO2 24.0 20.0 - 31.0 mmol/L    Calcium 8.3 (L) 8.7 - 10.4 mg/dL    Total Protein 6.0 5.7 - 8.2 g/dL    Albumin 3.43 3.20 - 4.80 g/dL    ALT (SGPT) 28 7 - 40 U/L    AST (SGOT) 25 0 - 33 U/L    Alkaline Phosphatase 86 25 - 100 U/L    Total Bilirubin 1.1 0.3 - 1.2 mg/dL    eGFR Non African Amer 48 (L) >60 mL/min/1.73    Globulin 2.6 gm/dL    A/G Ratio 1.3 (L) 1.5 - 2.5 g/dL    BUN/Creatinine Ratio 16.8 7.0 - 25.0    Anion Gap 9.0 3.0 - 11.0 mmol/L   Magnesium    Collection Time: 08/18/18  9:45 AM   Result Value Ref Range    Magnesium 2.4 1.3 - 2.7 mg/dL   Light Blue Top    Collection Time: 08/18/18  9:45 AM   Result Value Ref Range    Extra Tube hold for add-on    Green Top (Gel)    Collection Time: 08/18/18  9:45 AM   Result Value Ref Range    Extra Tube Hold for add-ons.    Lavender Top    Collection Time: 08/18/18  9:45 AM   Result Value Ref Range    Extra Tube hold for add-on    Gold Top - SST    Collection Time: 08/18/18  9:45 AM   Result Value Ref Range    Extra Tube Hold for add-ons.    CBC Auto Differential    Collection Time: 08/18/18  9:45 AM   Result Value Ref Range    WBC 15.31 (H) 3.50 - 10.80 10*3/mm3    RBC 4.05 3.89 - 5.14 10*6/mm3    Hemoglobin 12.0 11.5 - 15.5 g/dL    Hematocrit 35.9 34.5 - 44.0 %    MCV 88.6 80.0 - 99.0 fL    MCH 29.6 27.0 - 31.0 pg    MCHC 33.4 32.0 - 36.0 g/dL    RDW 16.6 (H) 11.3 - 14.5 %    RDW-SD 52.4 37.0 - 54.0 fl    MPV 9.1 6.0 - 12.0 fL     "Platelets 345 150 - 450 10*3/mm3    Neutrophil % 91.3 (H) 41.0 - 71.0 %    Lymphocyte % 5.0 (L) 24.0 - 44.0 %    Monocyte % 3.3 0.0 - 12.0 %    Eosinophil % 0.3 0.0 - 3.0 %    Basophil % 0.1 0.0 - 1.0 %    Immature Grans % 0.5 0.0 - 0.6 %    Neutrophils, Absolute 13.99 (H) 1.50 - 8.30 10*3/mm3    Lymphocytes, Absolute 0.76 0.60 - 4.80 10*3/mm3    Monocytes, Absolute 0.50 0.00 - 1.00 10*3/mm3    Eosinophils, Absolute 0.05 0.00 - 0.30 10*3/mm3    Basophils, Absolute 0.01 0.00 - 0.20 10*3/mm3    Immature Grans, Absolute 0.08 (H) 0.00 - 0.03 10*3/mm3   POC Troponin, Rapid    Collection Time: 08/18/18  9:52 AM   Result Value Ref Range    Troponin I 0.00 0.00 - 0.07 ng/mL     Note: In addition to lab results from this visit, the labs listed above may include labs taken at another facility or during a different encounter within the last 24 hours. Please correlate lab times with ED admission and discharge times for further clarification of the services performed during this visit.    XR Chest 1 View    (Results Pending)     Vitals:    08/18/18 0857 08/18/18 1000 08/18/18 1037   BP: 148/90 134/61    Pulse: 70 62    Resp: 16 16    Temp: 98.6 °F (37 °C)     TempSrc: Oral     SpO2: 92% 93%    Weight:   39 kg (86 lb)   Height:   157.5 cm (62\")     Medications   sodium chloride 0.9 % flush 10 mL (not administered)     ECG/EMG Results (last 24 hours)     ** No results found for the last 24 hours. **        ECG 12 Lead   Final Result   Test Reason : Weak/Dizzy/AMS protocol   Blood Pressure : **/** mmHG   Vent. Rate : 066 BPM     Atrial Rate : 066 BPM      P-R Int : 102 ms          QRS Dur : 070 ms       QT Int : 400 ms       P-R-T Axes : 051 -07 034 degrees      QTc Int : 419 ms      Normal sinus rhythm   No ectopy or ST abnormality   Confirmed by ABBY SANTILLAN MD ([id number]) on 8/18/2018 10:47:30 AM      Referred By:  TYRELL           Confirmed By:ABBY SANTILLAN MD                        [facility] [place]    Final diagnoses:   Weakness   Dehydration "   Dementia without behavioral disturbance, unspecified dementia type       Documentation assistance provided by ines Mcdowell.  Information recorded by the ines was done at my direction and has been verified and validated by me.     Stef Mcdowell  08/18/18 0950       Stef Mcdowell  08/18/18 0954       Stef Mcdowell  08/18/18 1046       Rai Diaz MD  08/21/18 7971

## 2018-08-18 NOTE — PLAN OF CARE
Problem: Fluid Volume Deficit (Adult)  Goal: Identify Related Risk Factors and Signs and Symptoms  Outcome: Ongoing (interventions implemented as appropriate)    Goal: Optimal Fluid Balance  Outcome: Ongoing (interventions implemented as appropriate)      Problem: Patient Care Overview  Goal: Plan of Care Review  Outcome: Ongoing (interventions implemented as appropriate)    Goal: Individualization and Mutuality  Outcome: Ongoing (interventions implemented as appropriate)    Goal: Discharge Needs Assessment  Outcome: Ongoing (interventions implemented as appropriate)    Goal: Interprofessional Rounds/Family Conf  Outcome: Ongoing (interventions implemented as appropriate)

## 2018-08-18 NOTE — H&P
[facility] Medicine Services  HISTORY AND PHYSICAL    Patient Name: Eliane Zamora  : 1929  MRN: [id number]  Primary Care Physician: Jack Chilel MD    Subjective   Subjective     Chief Complaint: FTT, confusion, falls    HPI:  Eliane Zamora is a 89 y.o. female with a  Hx of dementia, PAF on amiodarone, hypothyroidism on synthroid. She presents from home with concerns for confusion and being found down the last 3 nights. Patient has dementia, as such most of the his has been provided by the spouse and daughter. They state that since her d/c form rehab she has been progressively declining,her PO intake has dropped, she has been sleeping more, she has been found on the floor several times, as such the  has been having more difficulty taking care of her at home. On arrival in the ED patient was hemodynamically stable, w/u did show some leukocytosis, her blood glucose was 85. Per nursing patient was unkempt and filthy. Hospital medicine was then asked to admit.     Review of Systems   UTO sec to mental status    Personal History     Past Medical History:   Diagnosis Date   • Abnormal stress echo     History of abnormal stress echocardiogram with recent echocardiogram showing borderline LV function.    • Dementia    • GERD (gastroesophageal reflux disease)    • Hypercholesterolemia    • Hypothyroidism    • Osteoporosis     /Mild degenerative joint disease   • PAF (paroxysmal atrial fibrillation) (CMS/[facility])    • UTI (urinary tract infection)     Current urinary tract infection by laboratory value, 2013:  Less than 100,000 units/mL of E. coli.       Past Surgical History:   Procedure Laterality Date   • BREAST SURGERY      benign breast biopsy   •  SECTION         Family History: None endorsed    Social History:  reports that she has never smoked. She has never used smokeless tobacco. She reports that she drinks about 0.6 oz of alcohol per week . She reports  that she does not use drugs.  Social History     Social History Narrative   • No narrative on file     Medications:  Available home medication information reviewed     Allergies   Allergen Reactions   • Codeine      Severe nausea and vomiting       Objective   Objective     Vital Signs:   Temp:  [98.6 °F (37 °C)] 98.6 °F (37 °C)  Heart Rate:  [62-70] 64  Resp:  [16] 16  BP: (106-148)/(56-90) 132/60      Physical Exam   Constitutional: Elderly lady, thin, chronically ill lady, in no acute distress, awake, alert  Eyes: PERRLA, sclerae anicteric, no conjunctival injection  HENT: NCAT, mucous membranes dry  Neck: Supple, no thyromegaly, no lymphadenopathy, trachea midline  Respiratory: Clear to auscultation bilaterally, nonlabored respirations   Cardiovascular: RRR, no murmurs, rubs, or gallops, palpable pedal pulses bilaterally  Gastrointestinal: Positive bowel sounds, soft, nontender, nondistended  Musculoskeletal: No bilateral ankle edema, no clubbing or cyanosis to extremities  Psychiatric: depressed mood, flat affect.  Neurologic: Oriented x 1, no focal deficitsSkin: No rashes.    Results Reviewed:  I have personally reviewed current lab, radiology, and data and agree.      Results from last 7 days  Lab Units 08/18/18  0945   WBC 10*3/mm3 15.31*   HEMOGLOBIN g/dL 12.0   HEMATOCRIT % 35.9   PLATELETS 10*3/mm3 345       Results from last 7 days  Lab Units 08/18/18  0945   SODIUM mmol/L 131*   POTASSIUM mmol/L 4.7   CHLORIDE mmol/L 98*   CO2 mmol/L 24.0   BUN mg/dL 18   CREATININE mg/dL 1.07   GLUCOSE mg/dL 85   CALCIUM mg/dL 8.3*   ALT (SGPT) U/L 28   AST (SGOT) U/L 25     Estimated Creatinine Clearance: 21.9 mL/min (by C-G formula based on SCr of 1.07 mg/dL).  Brief Urine Lab Results  (Last result in the past 365 days)      Color   Clarity   Blood   Leuk Est   Nitrite   Protein   CREAT   Urine HCG        08/18/18 1038 Yellow Clear Negative Negative Negative Negative             No results found for: BNP  Imaging  Results (last 24 hours)     Procedure Component Value Units Date/Time    XR Chest 1 View [[id number]] Updated:  08/18/18 0952        Results for orders placed during the hospital encounter of 06/07/18   Adult Transthoracic Echo Complete W/ Cont if Necessary Per Protocol    Narrative · Left ventricular systolic function is normal.  · Estimated EF appears to be in the range of 66 - 70%.  · Left atrial cavity size is mildly dilated.          Assessment/Plan   Assessment / Plan     89 yof with hx of dementia presents with worsening confusion and falls, admitted with adult failure to thrive    Assessment & Plan:  - Acute encephalopathy/delirium with superimposed dementia that seems to be progressively worsening, Seroquel 6.25mg qhs  - Suspected falls, found down several nights in a row, no witnessed falls, check CK, renal function is ok, will need PT/OT  - Leukocytosis, no infectious process identified thus far, cxr ok, will repeat UA , no fevers, get procal, hold on abx  - Malnutrition ?severe, will consult nutrition/dietician for assesment  - PAF currently rate controlled on dilt, continue this, no AC sec to falls and age  - Hypoglycemia suspect this is sec to poor PO intake, will start IVF D5/1/2 NS @100ml/hr  - Hyponatremia, mild, suspect hypovolemic, continue fluids as above  - Dispo: CM consulted.I , doubt spouse will be able to take care of patient moving forward, she will likely need long term placement.    DVT prophylaxis:Lovenox    CODE STATUS:    There are no questions and answers to display.       Admission Status:  I believe this patient meets OBSERVATION status, however if further evaluation or treatment plans warrant, status may change.  Based upon current information, I predict patient's care encounter to be less than or equal to 2 midnights.    Electronically signed by Niko Townsend MD, 08/18/18, 2:47 PM.

## 2018-08-19 LAB
ANION GAP SERPL CALCULATED.3IONS-SCNC: 3 MMOL/L (ref 3–11)
BASOPHILS # BLD AUTO: 0.01 10*3/MM3 (ref 0–0.2)
BASOPHILS NFR BLD AUTO: 0.1 % (ref 0–1)
BUN BLD-MCNC: 14 MG/DL (ref 9–23)
BUN/CREAT SERPL: 15.1 (ref 7–25)
CALCIUM SPEC-SCNC: 7.4 MG/DL (ref 8.7–10.4)
CHLORIDE SERPL-SCNC: 97 MMOL/L (ref 99–109)
CO2 SERPL-SCNC: 24 MMOL/L (ref 20–31)
CREAT BLD-MCNC: 0.93 MG/DL (ref 0.6–1.3)
DEPRECATED RDW RBC AUTO: 52.1 FL (ref 37–54)
EOSINOPHIL # BLD AUTO: 0.09 10*3/MM3 (ref 0–0.3)
EOSINOPHIL NFR BLD AUTO: 0.7 % (ref 0–3)
ERYTHROCYTE [DISTWIDTH] IN BLOOD BY AUTOMATED COUNT: 16.4 % (ref 11.3–14.5)
GFR SERPL CREATININE-BSD FRML MDRD: 57 ML/MIN/1.73
GLUCOSE BLD-MCNC: 115 MG/DL (ref 70–100)
HCT VFR BLD AUTO: 29.9 % (ref 34.5–44)
HGB BLD-MCNC: 10.1 G/DL (ref 11.5–15.5)
IMM GRANULOCYTES # BLD: 0.04 10*3/MM3 (ref 0–0.03)
IMM GRANULOCYTES NFR BLD: 0.3 % (ref 0–0.6)
LYMPHOCYTES # BLD AUTO: 0.7 10*3/MM3 (ref 0.6–4.8)
LYMPHOCYTES NFR BLD AUTO: 5.4 % (ref 24–44)
MCH RBC QN AUTO: 29.5 PG (ref 27–31)
MCHC RBC AUTO-ENTMCNC: 33.8 G/DL (ref 32–36)
MCV RBC AUTO: 87.4 FL (ref 80–99)
MONOCYTES # BLD AUTO: 0.56 10*3/MM3 (ref 0–1)
MONOCYTES NFR BLD AUTO: 4.3 % (ref 0–12)
NEUTROPHILS # BLD AUTO: 11.67 10*3/MM3 (ref 1.5–8.3)
NEUTROPHILS NFR BLD AUTO: 89.5 % (ref 41–71)
PLATELET # BLD AUTO: 311 10*3/MM3 (ref 150–450)
PMV BLD AUTO: 9 FL (ref 6–12)
POTASSIUM BLD-SCNC: 4.5 MMOL/L (ref 3.5–5.5)
RBC # BLD AUTO: 3.42 10*6/MM3 (ref 3.89–5.14)
SODIUM BLD-SCNC: 124 MMOL/L (ref 132–146)
T4 FREE SERPL-MCNC: 1.28 NG/DL (ref 0.89–1.76)
WBC NRBC COR # BLD: 13.03 10*3/MM3 (ref 3.5–10.8)

## 2018-08-19 PROCEDURE — 97165 OT EVAL LOW COMPLEX 30 MIN: CPT

## 2018-08-19 PROCEDURE — G0378 HOSPITAL OBSERVATION PER HR: HCPCS

## 2018-08-19 PROCEDURE — G8978 MOBILITY CURRENT STATUS: HCPCS

## 2018-08-19 PROCEDURE — 97162 PT EVAL MOD COMPLEX 30 MIN: CPT

## 2018-08-19 PROCEDURE — 97530 THERAPEUTIC ACTIVITIES: CPT

## 2018-08-19 PROCEDURE — 25010000002 HEPARIN (PORCINE) PER 1000 UNITS: Performed by: INTERNAL MEDICINE

## 2018-08-19 PROCEDURE — G8979 MOBILITY GOAL STATUS: HCPCS

## 2018-08-19 PROCEDURE — 80048 BASIC METABOLIC PNL TOTAL CA: CPT | Performed by: INTERNAL MEDICINE

## 2018-08-19 PROCEDURE — G8987 SELF CARE CURRENT STATUS: HCPCS

## 2018-08-19 PROCEDURE — 99233 SBSQ HOSP IP/OBS HIGH 50: CPT | Performed by: INTERNAL MEDICINE

## 2018-08-19 PROCEDURE — 85025 COMPLETE CBC W/AUTO DIFF WBC: CPT | Performed by: INTERNAL MEDICINE

## 2018-08-19 PROCEDURE — G8988 SELF CARE GOAL STATUS: HCPCS

## 2018-08-19 PROCEDURE — 84439 ASSAY OF FREE THYROXINE: CPT | Performed by: INTERNAL MEDICINE

## 2018-08-19 RX ORDER — SODIUM CHLORIDE 9 MG/ML
50 INJECTION, SOLUTION INTRAVENOUS CONTINUOUS
Status: DISCONTINUED | OUTPATIENT
Start: 2018-08-19 | End: 2018-08-22

## 2018-08-19 RX ADMIN — HEPARIN SODIUM 5000 UNITS: 5000 INJECTION, SOLUTION INTRAVENOUS; SUBCUTANEOUS at 21:40

## 2018-08-19 RX ADMIN — DILTIAZEM HYDROCHLORIDE 120 MG: 120 CAPSULE, EXTENDED RELEASE ORAL at 08:26

## 2018-08-19 RX ADMIN — LEVOTHYROXINE SODIUM 75 MCG: 75 TABLET ORAL at 08:26

## 2018-08-19 RX ADMIN — QUETIAPINE FUMARATE 6.25 MG: 25 TABLET ORAL at 21:40

## 2018-08-19 RX ADMIN — HEPARIN SODIUM 5000 UNITS: 5000 INJECTION, SOLUTION INTRAVENOUS; SUBCUTANEOUS at 14:58

## 2018-08-19 RX ADMIN — HEPARIN SODIUM 5000 UNITS: 5000 INJECTION, SOLUTION INTRAVENOUS; SUBCUTANEOUS at 05:30

## 2018-08-19 RX ADMIN — SODIUM CHLORIDE 100 ML/HR: 9 INJECTION, SOLUTION INTRAVENOUS at 09:41

## 2018-08-19 RX ADMIN — PANTOPRAZOLE SODIUM 40 MG: 40 TABLET, DELAYED RELEASE ORAL at 08:26

## 2018-08-19 NOTE — PLAN OF CARE
Problem: Patient Care Overview  Goal: Plan of Care Review  Outcome: Ongoing (interventions implemented as appropriate)   08/19/18 0548   Coping/Psychosocial   Plan of Care Reviewed With patient   Plan of Care Review   Progress no change   OTHER   Outcome Summary VSS. no c/o pain or discomfort. pt remains confused. remains on fluids. will continue to monitor.        Problem: Skin Injury Risk (Adult)  Goal: Identify Related Risk Factors and Signs and Symptoms  Outcome: Ongoing (interventions implemented as appropriate)   08/19/18 0548   Skin Injury Risk (Adult)   Related Risk Factors (Skin Injury Risk) advanced age;edema     Goal: Skin Health and Integrity  Outcome: Ongoing (interventions implemented as appropriate)   08/19/18 0548   Skin Injury Risk (Adult)   Skin Health and Integrity making progress toward outcome

## 2018-08-19 NOTE — PLAN OF CARE
Problem: Patient Care Overview  Goal: Plan of Care Review  Outcome: Ongoing (interventions implemented as appropriate)   08/19/18 1651   Coping/Psychosocial   Plan of Care Reviewed With patient;daughter   Plan of Care Review   Progress no change   OTHER   Outcome Summary VSS, patient very confused today. Incontinent of bladder.Attempts to get out of the bed , bed alarm set and room is close to nurse's station. Will continue to monitor.       Problem: Skin Injury Risk (Adult)  Goal: Identify Related Risk Factors and Signs and Symptoms  Outcome: Ongoing (interventions implemented as appropriate)    Goal: Skin Health and Integrity  Outcome: Ongoing (interventions implemented as appropriate)

## 2018-08-19 NOTE — PROGRESS NOTES
[facility] Medicine Services  PROGRESS NOTE    Patient Name: Eliane Zamora  : 1929  MRN: [id number]    Date of Admission: 2018  Length of Stay: 0  Primary Care Physician: Jack Chilel MD    Subjective   Subjective     CC:FTT    HPI:No acute events overnight, patient states that she is doing ok, slept well, she is more awake and alert    Review of Systems  Gen- No fevers, chills  CV- No chest pain, palpitations  Resp- No cough, dyspnea  GI- No N/V/D, abd pain    Otherwise ROS is negative other than as per HPI    Objective   Objective     Vital Signs:   Temp:  [97.3 °F (36.3 °C)-98.6 °F (37 °C)] 98.6 °F (37 °C)  Heart Rate:  [59-90] 67  Resp:  [16-20] 20  BP: (106-137)/(50-80) 123/69      Physical Exam:  Constitutional: No acute distress, awake, alert  HENT: NCAT, mucous membranes moist  Respiratory: Clear to auscultation bilaterally, respiratory effort normal   Cardiovascular: RRR, no murmurs, rubs, or gallops, palpable pedal pulses bilaterally  Gastrointestinal: Positive bowel sounds, soft, nontender, nondistended  Musculoskeletal: No bilateral ankle edema  Psychiatric: flat affect, depressed mood  Neurologic: Oriented x 1, no focal deficits  Skin: No rashes    Results Reviewed:  I have personally reviewed current lab, radiology, and data and agree.      Results from last 7 days  Lab Units 18  0555 18  0945   WBC 10*3/mm3 13.03* 15.31*   HEMOGLOBIN g/dL 10.1* 12.0   HEMATOCRIT % 29.9* 35.9   PLATELETS 10*3/mm3 311 345       Results from last 7 days  Lab Units 18  0555 18  0945   SODIUM mmol/L 124* 131*   POTASSIUM mmol/L 4.5 4.7   CHLORIDE mmol/L 97* 98*   CO2 mmol/L 24.0 24.0   BUN mg/dL 14 18   CREATININE mg/dL 0.93 1.07   GLUCOSE mg/dL 115* 85   CALCIUM mg/dL 7.4* 8.3*   ALT (SGPT) U/L  --  28   AST (SGOT) U/L  --  25     Estimated Creatinine Clearance: 31.3 mL/min (by C-G formula based on SCr of 0.93 mg/dL).  No results found for:  BNP    Microbiology Results Abnormal     None          Imaging Results (last 24 hours)     Procedure Component Value Units Date/Time    XR Chest 1 View [[id number]] Collected:  08/18/18 1409     Updated:  08/18/18 2301    Narrative:          EXAMINATION: XR CHEST, SINGLE VIEW - 8/18/2018     INDICATION: Weakness, dizziness, altered mental status.     COMPARISON: 7/20/2018.     FINDINGS: The heart and pulmonary vasculature appear normal in size.  Mild chronic-appearing lung changes appear stable from the prior study,  allowing for lighter film technique today. No lung consolidation,  effusion, edema, or pneumothorax is seen.           Impression:       Stable mild chronic-appearing interstitial lung changes.     DICTATED:   8/18/2018  EDITED/ls :   8/18/2018      This report was finalized on 8/18/2018 10:59 PM by DR. Andrew Quijano MD.           Results for orders placed during the hospital encounter of 06/07/18   Adult Transthoracic Echo Complete W/ Cont if Necessary Per Protocol    Narrative · Left ventricular systolic function is normal.  · Estimated EF appears to be in the range of 66 - 70%.  · Left atrial cavity size is mildly dilated.          I have reviewed the medications.      diltiaZEM  mg Oral Q24H   heparin (porcine) 5,000 Units Subcutaneous Q8H   levothyroxine 75 mcg Oral Daily   pantoprazole 40 mg Oral Daily   QUEtiapine 6.25 mg Oral Nightly     Assessment/Plan   Assessment / Plan     Hospital Problem List     * (Principal)Failure to thrive in adult    PAF (paroxysmal atrial fibrillation) (CMS/[facility])    Overview Addendum 11/16/2016 10:33 AM by Josefina Luis     1. Paroxysmal atrial fibrillation:  a. Rhythm control with sotalol.  b. Excessive bradyarrhythmias associated with digoxin.     Her Holter monitor showed essentially sinus rhythm with no excessive bradycardia and no excessive tachycardia.  Her EKG today shows sinus rhythm, corrected QT interval of 418 milliseconds. 05/07/15          Hypothyroidism    Overview Signed 11/16/2016 10:32 AM by Josefina Luis     On chronic supplementation         Dementia    Leukocytosis    Hypoglycemia    Hyponatremia         Brief Hospital Course to date:  89 yof with hx of dementia presents with worsening confusion and falls, admitted with adult failure to thrive     Assessment & Plan:  - Suspected falls, found down several nights in a row, no witnessed falls, check CK, renal function is ok, will need PT/OT  - Acute encephalopathy/delirium with superimposed dementia, improved this morning, continue seroquel 6.25mg qhs  - Leukocytosis improved, no infectious process identified thus far, cxr ok, UA clean , no fevers, get procal, hold on abx  - Malnutrition ?severe, will consult nutrition/dietician for assesment  - PAF currently rate controlled on dilt, continue this, no AC sec to falls and age  - Hypoglycemia suspect this is sec to poor PO intake, s/p D5/1/2 NS, d/c this  - Hyponatremia worse, suspect hypovolemic, change IVF to NS.  - Hypothyroidism, TSH is elevated, but normal free T4 continue current synthroid dose     DVT prophylaxis:Lovenox     CODE STATUS:   Code Status and Medical Interventions:   Ordered at: 08/18/18 1427     Level Of Support Discussed With:    Next of Kin (If No Surrogate)     Code Status:    No CPR     Medical Interventions (Level of Support Prior to Arrest):    Full     Disposition: CM consulted. I doubt spouse will be able to take care of patient moving forward, she will likely need long term placement.    Electronically signed by Niko Townsend MD, 08/19/18, 9:31 AM.

## 2018-08-19 NOTE — CONSULTS
"                  Clinical Nutrition     Nutrition Assessment  Reason for Visit:   Identified at risk by screening criteria    Patient Name: Eliane Zamora  YOB: 1929  MRN: [id number]  Date of Encounter: 18 5:57 PM  Admission date: 2018    Nutrition Assessment     Hospital Problem List  Principal Problem:    Failure to thrive in adult  Active Problems:    PAF (paroxysmal atrial fibrillation) (CMS/HCC)    Hypothyroidism    Dementia    Leukocytosis    Hypoglycemia    Hyponatremia      PMH: She  has a past medical history of Abnormal stress echo; Dementia; GERD (gastroesophageal reflux disease); Hypercholesterolemia; Hypothyroidism; Osteoporosis; PAF (paroxysmal atrial fibrillation) (CMS/HCC); and UTI (urinary tract infection).   PSxH: She  has a past surgical history that includes  section and Breast surgery.     Reported/Observed/Food/Nutrition Related History:     No family at bedside.  Patient not able to provide any information.  Unable to determine if patient with signification weight loss based on documented weight.      Anthropometrics     Height: 157.5 cm (62\")  Last filed wt: Weight: 48.3 kg (106 lb 7.7 oz) (18 1440)  Weight Method: Bed scale    BMI: BMI (Calculated): 15.7  Normal: 18.5-24.9kg/m2    Ideal Body Weight (IBW) (kg): 50.43    Labs reviewed       Results from last 7 days  Lab Units 18  0555 18  0945   SODIUM mmol/L 124* 131*   POTASSIUM mmol/L 4.5 4.7   CHLORIDE mmol/L 97* 98*   CO2 mmol/L 24.0 24.0   BUN mg/dL 14 18   CREATININE mg/dL 0.93 1.07   CALCIUM mg/dL 7.4* 8.3*   BILIRUBIN mg/dL  --  1.1   ALK PHOS U/L  --  86   ALT (SGPT) U/L  --  28   AST (SGOT) U/L  --  25   GLUCOSE mg/dL 115* 85           No results found for: HGBA1C      Medications reviewed   Pertinent: Reviewed     Current Nutrition Prescription     PO: Diet Regular    Active Supplement Orders      DIET MESSAGE Please send a boost    Intake:  50% x 3 meals     Nutrition Diagnosis "     Problem Predicted suboptimal energy intake   Etiology Clinical condition    Signs/Symptoms Overall decline and weakness      Nutrition Intervention     Interventions Goal    General: Nutrition support treatment    Nutrition Interventions   1.  Follow treatment progress, Care plan reviewed    Monitor/ Evaluation    Per protocol, PO intake, Pertinent labs      Will Continue to follow per protocol      Elen Chapman RD  Time Spent: 20min

## 2018-08-19 NOTE — THERAPY EVALUATION
Acute Care - Physical Therapy Initial Evaluation  [facility]     Patient Name: Eliane Zamora  : 1929  MRN: [id number]  Today's Date: 2018   Onset of Illness/Injury or Date of Surgery: 18  Date of Referral to PT: 18  Referring Physician: MD Shi      Admit Date: 2018    Visit Dx:     ICD-10-CM ICD-9-CM   1. Weakness R53.1 780.79   2. Dehydration E86.0 276.51   3. Dementia without behavioral disturbance, unspecified dementia type F03.90 294.20   4. Impaired mobility and ADLs Z74.09 799.89   5. Impaired functional mobility, balance, gait, and endurance Z74.09 V49.89     Patient Active Problem List   Diagnosis   • PAF (paroxysmal atrial fibrillation) (CMS/HCC)   • Hypothyroidism   • Abnormal stress echo   • Osteoporosis   • Hypercholesterolemia   • GERD (gastroesophageal reflux disease)   • Right lower lobe pneumonia (CMS/HCC)   • Dementia   • Leukocytosis   • Hypoglycemia   • Failure to thrive in adult   • Hyponatremia     Past Medical History:   Diagnosis Date   • Abnormal stress echo     History of abnormal stress echocardiogram with recent echocardiogram showing borderline LV function.    • Dementia    • GERD (gastroesophageal reflux disease)    • Hypercholesterolemia    • Hypothyroidism    • Osteoporosis     /Mild degenerative joint disease   • PAF (paroxysmal atrial fibrillation) (CMS/HCC)    • UTI (urinary tract infection)     Current urinary tract infection by laboratory value, 2013:  Less than 100,000 units/mL of E. coli.     Past Surgical History:   Procedure Laterality Date   • BREAST SURGERY      benign breast biopsy   •  SECTION          PT ASSESSMENT (last 12 hours)      Physical Therapy Evaluation     Row Name 18 1144          PT Evaluation Time/Intention    Subjective Information complains of;weakness   cold/shivering if blkt/sheet removed;EOBw/OTearlier:hypot.  -DM     Document Type evaluation  -DM     Mode of Treatment individual therapy;physical  "therapy  -DM     Patient Effort fair  -DM     Symptoms Noted During/After Treatment dizziness;fatigue;significant change in vital signs   'FREEZING\"  -DM     Comment ORTHOSTAT hypot.;BP's sup & sit  -DM     Row Name 08/19/18 1142          General Information    Patient Profile Reviewed? yes  -DM     Onset of Illness/Injury or Date of Surgery 08/18/18  -DM     Referring Physician MD Shi  -DM     Patient Observations cooperative;agree to therapy   Drowsy  -DM     Patient/Family Observations dozing in sup;tele,RA,IV,BED alarm,dry flow pads tucked btwn LE's;no family present; incont during O.T. earlier;per PCT,wears brief @ home  -DM     Prior Level of Function min assist:;all household mobility;gait;transfer;bed mobility   Priyanka mobil(gtRwx);indepFeed,groom;modAdsg,Ba;Dep.HM,ShopDriv  -DM     Equipment Currently Used at Home commode, bedside;grab bar;shower chair;walker, rolling   placesBSC overHerComm;sh.chair  -DM     Pertinent History of Current Functional Problem falls [id number];spouseUnable to assist up 3rd night;covered pt w/ blanket & called EMS next morning;diffic caring for pt; unable to keep house clean;inER,pt noted to be filthy,dehydrated, severe malnutrit, hypogly, hyponat.; c/o mult falls,hypot.,conf, ad.FTT;dx w/acute enceph, delirium, anx, constip, fibromyal, leukocytosis, PAF (on dilt.); h/o Dem,br CA,Freq falls; after d/c'd home from rehab, onset prog. decline     -DM     Existing Precautions/Restrictions fall;other (see comments)   orthostat hypot(check BP's);dehy  -DM     Limitations/Impairments safety/cognitive  -DM     Risks Reviewed patient:;LOB;dizziness;increased discomfort;change in vital signs;lines disloged  -DM     Benefits Reviewed patient:;improve function;increase independence;increase strength;increase balance;increase knowledge  -DM     Barriers to Rehab medically complex;previous functional deficit;cognitive status  -DM     Row Name 08/19/18 5747          Relationship/Environment    " Primary Source of Support/Comfort spouse  -DM     Lives With spouse  -DM     Family Caregiver if Needed child(horacio), adult;spouse   son  -DM     Row Name 08/19/18 1144          Resource/Environmental Concerns    Current Living Arrangements home/apartment/condo  -DM     Resource/Environmental Concerns other (see comments)  -DM     Transportation Concerns car, none  -DM     Row Name 08/19/18 1144          Home Main Entrance    Number of Stairs, Main Entrance one  -DM     Stairs Comment, Main Entrance 2-st. home,but lives on gr.floor;   -DM     Row Name 08/19/18 1144          Cognitive Assessment/Interventions    Additional Documentation Cognitive Assessment/Intervention (Group)  -DM     Row Name 08/19/18 1144          Cognitive Assessment/Intervention- PT/OT    Affect/Mental Status (Cognitive) low arousal/lethargic  -DM     Orientation Status (Cognition) oriented to;person   recognized spouse for OT  -DM     Follows Commands (Cognition) follows one step commands;75-90% accuracy  -DM     Safety Deficit (Cognitive) moderate deficit;impulsivity;insight into deficits/self awareness  -DM     Personal Safety Interventions fall prevention program maintained;gait belt;nonskid shoes/slippers when out of bed  -DM     Cognitive Assessment/Intervention Comment NSG did not want UIC d/t orthostat hypot,DEM,no family present  -DM     Row Name 08/19/18 1144          Safety Issues, Functional Mobility    Safety Issues Affecting Function (Mobility) ability to follow commands;impulsivity;insight into deficits/self awareness  -DM     Impairments Affecting Function (Mobility) balance;cognition;endurance/activity tolerance;pain;strength  -DM     Row Name 08/19/18 1144          Bed Mobility Assessment/Treatment    Bed Mobility Assessment/Treatment rolling left;rolling right;scooting/bridging;supine-sit;sit-supine  -DM     Rolling Left [place] (Bed Mobility) verbal cues;moderate assist (50% patient effort)  -DM     Rolling Right  "[place] (Bed Mobility) verbal cues;moderate assist (50% patient effort)  -DM     Scooting/Bridging [place] (Bed Mobility) verbal cues;dependent (less than 25% patient effort)  -DM     Supine-Sit [place] (Bed Mobility) verbal cues;maximum assist (25% patient effort)   LOBpost;L handGraspRail;c/oFreezing,thenDizzy;AP'sToMitigate  -DM     Sit-Supine [place] (Bed Mobility) maximum assist (25% patient effort)  -DM     Bed Mobility, Safety Issues cognitive deficits limit understanding;decreased use of arms for pushing/pulling;decreased use of legs for bridging/pushing;impaired trunk control for bed mobility  -DM     Assistive Device (Bed Mobility) bed rails;draw sheet;head of bed elevated  -DM     Comment (Bed Mobility) orthostatBP'sTaken;cues to maintainGrasp L rail, extTrunk,relax R UE to allow BP reading  -DM     Row Name 08/19/18 1144          Transfer Assessment/Treatment    Transfer Assessment/Treatment sit-stand transfer;stand-sit transfer  -DM     Comment (Transfers) cues for HP,seq  -DM     Sit-Stand [place] (Transfers) verbal cues;maximum assist (25% patient effort)  -DM     Stand-Sit [place] (Transfers) verbal cues;maximum assist (25% patient effort)   feetSliding forward(stabil.byPT);unable to getStandBP;incont  -DM     Row Name 08/19/18 1144          Sit-Stand Transfer    Assistive Device (Sit-Stand Transfers) other (see comments)  -DM     Row Name 08/19/18 1144          Stand-Sit Transfer    Assistive Device (Stand-Sit Transfers) other (see comments)   gt belt,bed rail;stabiliz.B kneesToFacil. inc uprightPosture  -DM     Row Name 08/19/18 1144          Gait/Stairs Assessment/Training    [place] Level (Gait) unable to assess   dizzy;incont;unable to stand fully erect  -DM     Row Name 08/19/18 1144          General ROM    GENERAL ROM COMMENTS B hips peres. 20 to 25% (reluct. to move d/t  \"freezing/shivering\",, weakness, dizziness w/ mobil  -DM     Row Name 08/19/18 1144       "    General Assessment (Manual Muscle Testing)    General Manual Muscle Testing (MMT) Assessment lower extremity strength deficits identified  -DM     Comment, General Manual Muscle Testing (MMT) Assessment BLE grossly  2+ to3-/5  -DM     Row Name 08/19/18 1144          Motor Assessment/Intervention    Additional Documentation Balance (Group);Balance Interventions (Group);Therapeutic Exercise (Group);Therapeutic Exercise Interventions (Group)  -DM     Row Name 08/19/18 1144          Therapeutic Exercise    89221 - PT Therapeutic Activity Minutes 38  -DM     Row Name 08/19/18 1144          Therapeutic Exercise    Lower Extremity (Therapeutic Exercise) gluteal sets;hamstring sets, bilateral;heel slides, bilateral;LAQ (long arc quad), bilateral;marching while seated;quad sets, bilateral;SAQ (short arc quad), bilateral;SLR (straight leg raise), bilateral  -DM     Lower Extremity Range of Motion (Therapeutic Exercise) hip abduction/adduction, bilateral;hip internal/external rotation, bilateral;ankle dorsiflexion/plantar flexion, bilateral   rests btwn sets d/t freezing,fatigue; onset dizziness(noMIP)  -DM     Exercise Type (Therapeutic Exercise) AAROM (active assistive range of motion);AROM (active range of motion);isometric contraction, static  -DM     Position (Therapeutic Exercise) seated;supine  -DM     Sets/Reps (Therapeutic Exercise) 1/10  -DM     Comment (Therapeutic Exercise) OCC.dozing off;re-awakened; c/o fatigue  -DM     Row Name 08/19/18 1144          Balance    Balance static sitting balance;static standing balance;dynamic sitting balance;dynamic standing balance  -DM     Row Name 08/19/18 1144          Static Sitting Balance    Level of [place] (Unsupported Sitting, Static Balance) moderate assist, 50 to 74% patient effort  -DM     Sitting Position (Unsupported Sitting, Static Balance) sitting on edge of bed   pushing back w/ upper trunk; cues to shift forward overCofG  -DM     Time Able to Maintain  Position (Unsupported Sitting, Static Balance) 3 to 4 minutes  -DM     Comment (Unsupported Sitting, Static Balance) PLB,trunk ext,LE exer  -DM     Row Name 08/19/18 1144          Dynamic Sitting Balance    Level of [place], Reaches Outside Midline (Sitting, Dynamic Balance) maximal assist, 25 to 49% patient effort  -DM     Sitting Position, Reaches Outside Midline (Sitting, Dynamic Balance) sitting on edge of bed  -DM     Comment, Reaches Outside Midline (Sitting, Dynamic Balance) scooting,PLB;BP taken  -DM     Row Name 08/19/18 1144          Static Standing Balance    Level of [place] (Supported Standing, Static Balance) maximal assist, 25 to 49% patient effort  -DM     Time Able to Maintain Position (Supported Standing, Static Balance) 1 to 2 minutes  -DM     Assistive Device Utilized (Supported Standing, Static Balance) other (see comments)   L bedrail;RUE supp;gt belt  -DM     Comment (Supported Standing, Static Balance) unable to get standing BP(Incont;dizzy;To sup.  -DM     Comment (Unsupported Standing, Static Balance) hygiene/all linens& pads changed  -DM     Row Name 08/19/18 1144          Dynamic Standing Balance    Level of [place], Reaches Outside Midline (Standing, Dynamic Balance) maximal assist, 25 to 49% patient effort  -DM     Time Able to Maintain Position, Reaches Outside Midline (Standing, Dynamic Balance) 15 to 30 seconds  -DM     Comment, Reaches Outside Midline (Standing, Dynamic Balance) --   counterpressure to shift sacrum over C of G  -DM     Row Name 08/19/18 1144          Pain Assessment    Additional Documentation Pain Scale: FACES Pre/Post-Treatment (Group)  -DM     Row Name 08/19/18 1144          Pain Scale: Numbers Pre/Post-Treatment    Pain Location - Side Right  -DM     Pain Location - Orientation upper  -DM     Pain Location extremity  -DM     Pain Intervention(s) Repositioned;Rest  -DM     Row Name 08/19/18 1144          Pain Scale: FACES Pre/Post-Treatment     Pain: FACES Scale, Pretreatment 2-->hurts little bit  -DM     Pain: FACES Scale, Post-Treatment 8-->hurts whole lot  -DM     Pre/Post Treatment Pain Comment RUE (grimacing/moaning while BP's Taken)  -DM     Row Name 08/19/18 1144          Plan of Care Review    Plan of Care Reviewed With patient  -DM     Row Name 08/19/18 1144          Physical Therapy Clinical Impression    Date of Referral to PT 08/18/18  -DM     PT Diagnosis (PT Clinical Impression) impaired funct mobil  -DM     Criteria for Skilled Interventions Met (PT Clinical Impression) yes;treatment indicated  -DM     Pathology/Pathophysiology Noted (Describe Specifically for Each System) musculoskeletal  -DM     Impairments Found (describe specific impairments) gait, locomotion, and balance  -DM     Rehab Potential (PT Clinical Summary) fair, will monitor progress closely  -DM     Care Plan Review (PT) evaluation/treatment results reviewed;patient/other agree to care plan  -DM     Row Name 08/19/18 1144          Vital Signs    Pre Systolic BP Rehab 118  -DM     Pre Treatment Diastolic BP 49  -DM     Intra Systolic BP Rehab 95  -DM     Intra Treatment Diastolic BP 52  -DM     Post Systolic BP Rehab 82   82/50 EOB;2nd reading sup.=118/50  -DM     Post Treatment Diastolic BP 50  -DM     Pretreatment Heart Rate (beats/min) 67  -DM     Intratreatment Heart Rate (beats/min) 79  -DM     Posttreatment Heart Rate (beats/min) 69  -DM     Pre SpO2 (%) 99  -DM     O2 Delivery Pre Treatment room air  -DM     Intra SpO2 (%) 92  -DM     O2 Delivery Intra Treatment room air  -DM     Post SpO2 (%) 93  -DM     O2 Delivery Post Treatment room air  -DM     Pre Patient Position Supine  -DM     Intra Patient Position Standing  -DM     Post Patient Position Supine  -DM     Row Name 08/19/18 1144          Physical Therapy Goals    Bed Mobility Goal Selection (PT) bed mobility, PT goal 1  -DM     Transfer Goal Selection (PT) transfer, PT goal 1  -DM     Gait Training Goal  Selection (PT) gait training, PT goal 1  -DM     Stairs Goal Selection (PT) stairs, PT goal 1  -DM     Additional Documentation Stairs Goal Selection (PT) (Row)  -DM     Row Name 08/19/18 1144          Bed Mobility Goal 1 (PT)    Activity/Assistive Device (Bed Mobility Goal 1, PT) bed mobility activities, all  -DM     [place] Level/Cues Needed (Bed Mobility Goal 1, PT) minimum assist (75% or more patient effort)  -DM     Time Frame (Bed Mobility Goal 1, PT) long term goal (LTG);1 week  -DM     Row Name 08/19/18 1144          Transfer Goal 1 (PT)    Activity/Assistive Device (Transfer Goal 1, PT) sit-to-stand/stand-to-sit;bed-to-chair/chair-to-bed;walker, rolling  -DM     [place] Level/Cues Needed (Transfer Goal 1, PT) moderate assist (50-74% patient effort)  -DM     Time Frame (Transfer Goal 1, PT) long term goal (LTG);1 week  -DM     Row Name 08/19/18 1144          Gait Training Goal 1 (PT)    Activity/Assistive Device (Gait Training Goal 1, PT) gait (walking locomotion);assistive device use;walker, rolling   STABLE VS  -DM     [place] Level (Gait Training Goal 1, PT) maximum assist (25-49% patient effort)  -DM     Distance (Gait Goal 1, PT) 25  -DM     Time Frame (Gait Training Goal 1, PT) long term goal (LTG);1 week  -DM     Row Name 08/19/18 1144          Stairs Goal 1 (PT)    Activity/Assistive Device (Stairs Goal 1, PT) stairs, all skills;walker, rolling   STABLE VS;no LOB  -DM     [place] Level/Cues Needed (Stairs Goal 1, PT) maximum assist (25-49% patient effort)  -DM     Number of Stairs (Stairs Goal 1, PT) 1  -DM     Time Frame (Stairs Goal 1, PT) long term goal (LTG);1 week  -DM     Row Name 08/19/18 1143          Patient Education Goal (PT)    Activity (Patient Education Goal, PT) HEP exer  -DM     [place]/Cues/Accuracy (Memory Goal 2, PT) demonstrates adequately  -DM     Time Frame (Patient Education Goal, PT) long term goal (LTG);1 week  -DM     Row Name 08/19/18 1142           Positioning and Restraints    Pre-Treatment Position in bed  -DM     Post Treatment Position bed  -DM     In Bed notified nsg;supine;call light within reach;encouraged to call for assist;exit alarm on;legs elevated  -DM     Row Name 08/19/18 1144          Living Environment    Home Accessibility stairs to enter home   BSC over toilet  -DM       User Key  (r) = Recorded By, (t) = Taken By, (c) = Cosigned By    Initials Name Provider Type    DM Lashanda Gerard, PT Physical Therapist          Physical Therapy Education     Title: PT OT SLP Therapies (Active)     Topic: Physical Therapy (Active)     Point: Mobility training (Active)    Learning Progress Summary     Learner Status Readiness Method Response Comment Documented by    Patient Active ADRIAN Causey NR  RENETTA 08/19/18 1314          Point: Home exercise program (Active)    Learning Progress Summary     Learner Status Readiness Method Response Comment Documented by    Patient Active ADRIAN Causey 08/19/18 1314          Point: Body mechanics (Active)    Learning Progress Summary     Learner Status Readiness Method Response Comment Documented by    Patient Active ADRIAN Causey 08/19/18 1314          Point: Precautions (Active)    Learning Progress Summary     Learner Status Readiness Method Response Comment Documented by    Patient Active ADRIAN Causey 08/19/18 1314                      User Key     Initials Effective Dates Name Provider Type Discipline    DM 06/19/15 -  Lashanda Gerard, PT Physical Therapist PT                PT Recommendation and Plan  Anticipated Discharge Disposition (PT): skilled nursing facility  Planned Therapy Interventions (PT Eval): balance training, bed mobility training, gait training, home exercise program, patient/family education, postural re-education, stair training, strengthening, transfer training  Therapy Frequency (PT Clinical Impression): daily  Outcome Summary/Treatment Plan (PT)  Anticipated Discharge Disposition (PT):  skilled nursing facility  Plan of Care Reviewed With: patient  Progress: improving  Outcome Summary: Presents w/ evolving sympt, incl. enceph, delirium,DEHY, FTT, orthostat hypot,freq falls, leukocytosis, hypogly, elect imbal (hyponat), dec HGB (11.6), dec strength/endur, & impaired funct mobil;able to ricky EOB activ. 4 min & standing  activ.1 min, but limited by orthostat BP drop,dizziness,incont, fatigue & desat to 92%; recomm. S.T. rehab d/t freq falls PTA, FTT, unstable VS, & Spouse's inability to care for pt.           Outcome Measures     Row Name 08/19/18 1144 08/19/18 1000          How much help from another person do you currently need...    Turning from your back to your side while in flat bed without using bedrails? 2  -DM  --     Moving from lying on back to sitting on the side of a flat bed without bedrails? 2  -DM  --     Moving to and from a bed to a chair (including a wheelchair)? 1  -DM  --     Standing up from a chair using your arms (e.g., wheelchair, bedside chair)? 2  -DM  --     Climbing 3-5 steps with a railing? 1  -DM  --     To walk in hospital room? 1  -DM  --     AM-PAC 6 Clicks Score 9  -DM  --        How much help from another is currently needed...    Putting on and taking off regular lower body clothing?  -- 1  -BRANDON     Bathing (including washing, rinsing, and drying)  -- 2  -BRANDON     Toileting (which includes using toilet bed pan or urinal)  -- 1  -BRANDON     Putting on and taking off regular upper body clothing  -- 2  -BRANDON     Taking care of personal grooming (such as brushing teeth)  -- 2  -BRANDON     Eating meals  -- 2  -BRANDON     Score  -- 10  -BRANDON        Functional Assessment    Outcome Measure Options AM-PAC 6 Clicks Basic Mobility (PT)  -DM AM-PAC 6 Clicks Daily Activity (OT)  -BRANDON       User Key  (r) = Recorded By, (t) = Taken By, (c) = Cosigned By    Initials Name Provider Type    Candy English, OT Occupational Therapist    Lashanda Rodríguez, PT Physical Therapist           Time  Calculation:         PT Charges     Row Name 08/19/18 1323 08/19/18 1144          Time Calculation    Start Time 1144  -DM  --     PT Received On 08/19/18  -DM  --     PT Goal Re-Cert Due Date 08/29/18  -DM  --        Time Calculation- PT    Total Timed Code Minutes- PT 38 minute(s)  -DM  --        Timed Charges    36584 - PT Therapeutic Activity Minutes  -- 38  -DM       User Key  (r) = Recorded By, (t) = Taken By, (c) = Cosigned By    Initials Name Provider Type    DM Lashanda Gerard, PT Physical Therapist        Therapy Suggested Charges     Code   Minutes Charges    49706 (CPT®) Hc Pt Neuromusc Re Education Ea 15 Min      30263 (CPT®) Hc Pt Ther Proc Ea 15 Min      71039 (CPT®) Hc Gait Training Ea 15 Min      67679 (CPT®) Hc Pt Therapeutic Act Ea 15 Min 38 3    75460 (CPT®) Hc Pt Manual Therapy Ea 15 Min      15609 (CPT®) Hc Pt Iontophoresis Ea 15 Min      76571 (CPT®) Hc Pt Elec Stim Ea-Per 15 Min      38300 (CPT®) Hc Pt Ultrasound Ea 15 Min      34375 (CPT®) Hc Pt Self Care/Mgmt/Train Ea 15 Min      94971 (CPT®) Hc Pt Prosthetic (S) Train Initial Encounter, Each 15 Min      82609 (CPT®) Hc Pt Orthotic(S)/Prosthetic(S) Encounter, Each 15 Min      58669 (CPT®) Hc Orthotic(S) Mgmt/Train Initial Encounter, Each 15min      Total  38 3        Therapy Charges for Today     Code Description Service Date Service Provider Modifiers Qty    02804320221 HC PT MOBILITY CURRENT 8/19/2018 Lashanda Gerard, PT GP, CL 1    87048113306 HC PT MOBILITY PROJECTED 8/19/2018 Lashanda Gerard, PT GP, CK 1    40460029833 HC PT THERAPEUTIC ACT EA 15 MIN 8/19/2018 Lashanda Gerard, PT GP 3    35890933001 HC PT EVAL MOD COMPLEXITY 4 8/19/2018 Lashanda Gerard, PT GP 1          PT G-Codes  PT Professional Judgement Used?: Yes  Outcome Measure Options: AM-PAC 6 Clicks Basic Mobility (PT)  Score: 9  Functional Limitation: Mobility: Walking and moving around  Mobility: Walking and Moving Around Current Status (): At least 60 percent but  less than 80 percent impaired, limited or restricted  Mobility: Walking and Moving Around Goal Status (): At least 40 percent but less than 60 percent impaired, limited or restricted      Lashanda Gerard, PT  8/19/2018

## 2018-08-19 NOTE — PLAN OF CARE
Problem: Patient Care Overview  Goal: Plan of Care Review   08/19/18 1314   Coping/Psychosocial   Plan of Care Reviewed With patient   Plan of Care Review   Progress improving   OTHER   Outcome Summary Presents w/ evolving sympt, incl. enceph, delirium,DEHY, FTT, orthostat hypot,freq falls, leukocytosis, hypogly, elect imbal (hyponat), dec HGB (11.6), dec strength/endur, & impaired funct mobil;able to ricky EOB activ. 4 min & standing activ.1 min, but limited by orthostat BP drop,dizziness,incont, fatigue & desat to 92%; recomm. S.T. rehab d/t freq falls PTA, FTT, unstable VS, & Spouse's inability to care for pt.

## 2018-08-19 NOTE — PLAN OF CARE
Problem: Patient Care Overview  Goal: Plan of Care Review  Outcome: Ongoing (interventions implemented as appropriate)   08/19/18 1051   Coping/Psychosocial   Plan of Care Reviewed With patient;spouse;family   Plan of Care Review   Progress no change   OTHER   Outcome Summary OT evaluation completed. Pt. mod to sup to sit, dep to scoot to EOB. Mod to stand and min to side step to HOB. Pt. unable to stand long. Once supine noted Low BP, but pt. reporting no symptoms. Once supine fully BP returned to normal. Appears mod to max all ADL task. Pt. appropriate for skilled OT services to assist with return to PLOF. Pt., will likely need SNF at discharge and then still question spouse ability to care for pt. alone.

## 2018-08-19 NOTE — THERAPY EVALUATION
Acute Care - Occupational Therapy Initial Evaluation  [facility]     Patient Name: Eliane Zamora  : 1929  MRN: [id number]  Today's Date: 2018  Onset of Illness/Injury or Date of Surgery: 18 (admit date)  Date of Referral to OT: 18  Referring Physician: Md Niko    Admit Date: 2018       ICD-10-CM ICD-9-CM   1. Weakness R53.1 780.79   2. Dehydration E86.0 276.51   3. Dementia without behavioral disturbance, unspecified dementia type F03.90 294.20   4. Impaired mobility and ADLs Z74.09 799.89     Patient Active Problem List   Diagnosis   • PAF (paroxysmal atrial fibrillation) (CMS/HCC)   • Hypothyroidism   • Abnormal stress echo   • Osteoporosis   • Hypercholesterolemia   • GERD (gastroesophageal reflux disease)   • Right lower lobe pneumonia (CMS/HCC)   • Dementia   • Leukocytosis   • Hypoglycemia   • Failure to thrive in adult   • Hyponatremia     Past Medical History:   Diagnosis Date   • Abnormal stress echo     History of abnormal stress echocardiogram with recent echocardiogram showing borderline LV function.    • Dementia    • GERD (gastroesophageal reflux disease)    • Hypercholesterolemia    • Hypothyroidism    • Osteoporosis     /Mild degenerative joint disease   • PAF (paroxysmal atrial fibrillation) (CMS/HCC)    • UTI (urinary tract infection)     Current urinary tract infection by laboratory value, 2013:  Less than 100,000 units/mL of E. coli.     Past Surgical History:   Procedure Laterality Date   • BREAST SURGERY      benign breast biopsy   •  SECTION            OT ASSESSMENT FLOWSHEET (last 72 hours)      Occupational Therapy Evaluation     Row Name 18 1000                   OT Evaluation Time/Intention    Subjective Information complains of;weakness   Being cold  -BRANDON        Document Type evaluation  -BRANDON        Mode of Treatment individual therapy;occupational therapy  -BRANDON        Patient Effort fair  -BRANDON        Symptoms Noted During/After  Treatment fatigue;significant change in vital signs  -BRANDON        Comment BP drop, but pt. not passing out or reporting dizziness.  -BRANDON           General Information    Patient Profile Reviewed? yes  -BRANDON        Onset of Illness/Injury or Date of Surgery 08/18/18   admit date  -BRANDON        Referring Physician Md Niko  -BRANDON        Patient Observations alert;cooperative;agree to therapy  -BRANDON        Patient/Family Observations Pt. supine in bed dozing with IV.  Pt. easily aroused, but flat affect with little verbalizations unless asked a question.   and grandson present.  -BRANDON        Prior Level of Function independent:;feeding;grooming;min assist:;all household mobility;transfer;mod assist:;dressing;bathing;dependent:;home management;cooking;cleaning;driving;shopping   meals on wheels lunch M-F,  does other.  -BRANDON        Equipment Currently Used at Home walker, rolling;commode;shower chair;grab bar  -BRANDON        Pertinent History of Current Functional Problem Pt. with supspected falls 3 night in a row and last day  could not get pt up and covered her with blanket til morning and then called EMS.  Pt. difficulty caring for pt.  Pt. found with severe malnutrition, hypoglycemia and hyponatremia, falls, confusion, AFTT, Acute encephalopathy.  -BRNADON        Existing Precautions/Restrictions fall   appears with ortho static hypotension, check BP's  -BRANDON        Limitations/Impairments safety/cognitive  -BRANDON        Risks Reviewed patient:;spouse/S.O.:;family:;LOB;increased discomfort;dizziness;change in vital signs;lines disloged  -BRANDON        Benefits Reviewed patient and family:;improve function;increase independence;increase strength;increase balance;increase knowledge  -BRANDON        Barriers to Rehab previous functional deficit;cognitive status  -BRANDON           Relationship/Environment    Primary Source of Support/Comfort spouse  -BRANDON        Lives With spouse  -BRANDON           Resource/Environmental Concerns    Current  Living Arrangements home/apartment/condo  -BRANDON           Home Main Entrance    Number of Stairs, Main Entrance one  -BRANDON        Stairs Comment, Main Entrance two story home, but stays one level  -BRANDON           Cognitive Assessment/Intervention- PT/OT    Orientation Status (Cognition) oriented to;person;disoriented to;place;situation;time   pt. did not know month and ID year with choices  -BRANDON        Follows Commands (Cognition) follows one step commands;verbal cues/prompting required;repetition of directions required;physical/tactile prompts required;over 90% accuracy  -BRANDON        Safety Deficit (Cognitive) moderate deficit;insight into deficits/self awareness;problem solving;safety precautions awareness;safety precautions follow-through/compliance  -BRANDON        Cognitive Assessment/Intervention Comment pt. wanting pull up on walker and hold to when sitting  -BRANDON           Safety Issues, Functional Mobility    Safety Issues Affecting Function (Mobility) ability to follow commands;insight into deficits/self awareness;judgment;problem solving;safety precautions follow-through/compliance;safety precaution awareness  -BRANDON        Impairments Affecting Function (Mobility) balance;cognition;endurance/activity tolerance;strength   dropping BP  -BRANDON           Bed Mobility Assessment/Treatment    Bed Mobility Assessment/Treatment scooting/bridging;supine-sit;sit-supine;rolling left  -BRANDON        Rolling Left [place] (Bed Mobility) minimum assist (75% patient effort);verbal cues;nonverbal cues (demo/gesture)  -BRANDON        Scooting/Bridging [place] (Bed Mobility) dependent (less than 25% patient effort);verbal cues;nonverbal cues (demo/gesture)  -BRANDON        Supine-Sit [place] (Bed Mobility) moderate assist (50% patient effort);verbal cues;nonverbal cues (demo/gesture)  -BRANDON        Sit-Supine [place] (Bed Mobility) moderate assist (50% patient effort);verbal cues;nonverbal cues (demo/gesture)  -BRANDON        Bed Mobility, Safety  Issues cognitive deficits limit understanding;decreased use of arms for pushing/pulling;decreased use of legs for bridging/pushing;impaired trunk control for bed mobility  -        Assistive Device (Bed Mobility) bed rails;head of bed elevated;draw sheet  -        Comment (Bed Mobility) pt.needed step by step cueing and could not scoot self.  -           Functional Mobility    Functional Mobility- Ind. Level minimum assist (75% patient effort);verbal cues required;nonverbal cues required (demo/gesture)   3 side steps to HOB  -        Functional Mobility- Device rolling walker  -        Functional Mobility-Distance (Feet) 1  -        Functional Mobility- Safety Issues step length decreased;loses balance backward  -        Functional Mobility- Comment fatigued quickly and BP likely dropping  -           Transfer Assessment/Treatment    Transfer Assessment/Treatment sit-stand transfer;stand-sit transfer  -        Comment (Transfers) pt. not pushing up or reaching back  -           Sit-Stand Transfer    Sit-Stand [place] (Transfers) moderate assist (50% patient effort);verbal cues;nonverbal cues (demo/gesture)  -        Assistive Device (Sit-Stand Transfers) walker, front-wheeled  -BRANDON           Stand-Sit Transfer    Stand-Sit [place] (Transfers) verbal cues;nonverbal cues (demo/gesture);moderate assist (50% patient effort)  -        Assistive Device (Stand-Sit Transfers) walker, front-wheeled  -           ADL Assessment/Intervention    BADL Assessment/Intervention toileting;lower body dressing;upper body dressing  -           Upper Body Dressing Assessment/Training    Upper Body Dressing [place] Level dependent (less than 25% patient effort);doff;don   gown changed due to wet with urine  -        Upper Body Dressing Position supine  -           Lower Body Dressing Assessment/Training    Comment (Lower Body Dressing) dependent with professional judgement.  Pt. could not  cross up legs to knee.  -BRANDON           Toileting Assessment/Training    [place] Level (Toileting) dependent (less than 25% patient effort)  -BRANDON        Comment (Toileting) pt. had wet bed.  When OT stood pt. aid changed pads and wiped pt's bottom.  -BRANDON           BADL Safety/Performance    Impairments, BADL Safety/Performance balance;cognition;endurance/activity tolerance;strength  -BRANDON           General ROM    GENERAL ROM COMMENTS WFL BUE AROM distally, shoulders only assessed to 90 degrees.  -BRANDON           General Assessment (Manual Muscle Testing)    Comment, General Manual Muscle Testing (MMT) Assessment BUE grossly 4/5  -BRANDON           Sensory Assessment/Intervention    Sensory General Assessment --   pt. did not report or demonstrate UE sensory deficit  -BRANDON        Additional Documentation Vision Assessment/Intervention (Group)  -BRANDON           Vision Assessment/Intervention    Visual Impairment/Limitations corrective lenses full time  -BRANDON           Positioning and Restraints    Pre-Treatment Position in bed  -BRANDON        Post Treatment Position bed  -BRANDON        In Bed supine;call light within reach;encouraged to call for assist;exit alarm on;with family/caregiver;legs elevated  -BRANDON           Pain Assessment    Additional Documentation Pain Scale: Numbers Pre/Post-Treatment (Group)  -BRANDON           Pain Scale: Numbers Pre/Post-Treatment    Pain Scale: Numbers, Pretreatment 0/10 - no pain  -BRANDON        Pain Scale: Numbers, Post-Treatment 0/10 - no pain  -BRANDON           Plan of Care Review    Plan of Care Reviewed With patient;spouse;family  -BRANDON           Clinical Impression (OT)    Date of Referral to OT 08/18/18  -BRANDON        OT Diagnosis Impaired ADL and transfer independence.  -BRANDON        Patient/Family Goals Statement (OT Eval) Help pt. regain as much independence as able.  -BRANDON        Criteria for Skilled Therapeutic Interventions Met (OT Eval) yes;treatment indicated  -BRANDON        Rehab Potential (OT Eval) good, to achieve  stated therapy goals  -BRANDON        Therapy Frequency (OT Eval) daily  -BRANDON        Care Plan Review (OT) care plan/treatment goals reviewed;evaluation/treatment results reviewed;risks/benefits reviewed;current/potential barriers reviewed;patient/other agree to care plan  -BRANDON        Care Plan Review, Other Participant (OT Eval) family  -BRANDON        Anticipated Equipment Needs at Discharge (OT) --   possible w/c use if not already available if can return home  -BRANDON        Anticipated Discharge Disposition (OT) skilled nursing facility;inpatient rehabilitation facility   question ability to return home under only  care  -BRANDON           Vital Signs    Pre Systolic BP Rehab 123  -BRANDON        Pre Treatment Diastolic BP 69  -BRANDON        Intra Systolic BP Rehab 65  -BRANDON        Intra Treatment Diastolic BP 52   sitting up in bed post standing, nurse in and aware  -BRANDON        Post Systolic BP Rehab 118   post supine with legs elevated  -BRANDON        Post Treatment Diastolic BP 49  -BRANDON        Pretreatment Heart Rate (beats/min) 72  -BRANDON        Posttreatment Heart Rate (beats/min) 69  -BRANDON        Pre SpO2 (%) 90  -BRANDON        O2 Delivery Pre Treatment room air  -BRANDON        Post SpO2 (%) 92  -BRANDON        O2 Delivery Post Treatment room air  -BRANDON        Pre Patient Position Supine  -BRANDON        Intra Patient Position Standing  -BRANDON        Post Patient Position Supine  -BRANDON           Planned OT Interventions    Planned Therapy Interventions (OT Eval) activity tolerance training;BADL retraining;cognitive/visual perception retraining;functional balance retraining;occupation/activity based interventions;patient/caregiver education/training;strengthening exercise;transfer/mobility retraining  -           OT Goals    Bed Mobility Goal Selection (OT) bed mobility, OT goal 1  -BRANDON        Transfer Goal Selection (OT) transfer, OT goal 1  -BRANDON        Toileting Goal Selection (OT) toileting, OT goal 1  -BRANDON        Grooming Goal Selection (OT) grooming, OT goal 1   -BRANDON        Activity Tolerance Goal Selection (OT) activity tolerance, OT goal 1  -BRANDON        Functional Mobility Goal Selection (OT) functional mobility, OT goal 1  -BRANDON        Additional Documentation Activity Tolerance Goal Selection (OT) (Row);Functional Mobility Selection (OT) (Row);Grooming Goal Selection (OT) (Row)  -BRANDON           Bed Mobility Goal 1 (OT)    Activity/Assistive Device (Bed Mobility Goal 1, OT) bed mobility activities, all  -BRANDON        [place] Level/Cues Needed (Bed Mobility Goal 1, OT) minimum assist (75% or more patient effort);tactile cues required;verbal cues required  -BRANDON        Time Frame (Bed Mobility Goal 1, OT) long term goal (LTG);10 days  -BRANDON        Barriers (Bed Mobility Goal 1, OT) cognition, prior level indep  -BRANDON        Progress/Outcomes (Bed Mobility Goal 1, OT) goal ongoing  -BRANDON           Transfer Goal 1 (OT)    Activity/Assistive Device (Transfer Goal 1, OT) sit-to-stand/stand-to-sit;toilet;commode, bedside without drop arms;walker, rolling  -BRANDON        [place] Level/Cues Needed (Transfer Goal 1, OT) contact guard assist  -BRANDON        Time Frame (Transfer Goal 1, OT) long term goal (LTG);10 days  -BRANDON        Barriers (Transfers Goal 1, OT) ortho static hypotension ?  -BRANDON        Progress/Outcome (Transfer Goal 1, OT) goal ongoing  -BRANDON           Toileting Goal 1 (OT)    Activity/Device (Toileting Goal 1, OT) perform perineal hygiene;commode, bedside without drop arms;grab bar/safety frame;raised toilet seat  -BRANDON        [place] Level/Cues Needed (Toileting Goal 1, OT) minimum assist (75% or more patient effort);tactile cues required;verbal cues required  -BRANDON        Time Frame (Toileting Goal 1, OT) long term goal (LTG);10 days  -BRANDON        Progress/Outcome (Toileting Goal 1, OT) goal ongoing  -BRANDON           Grooming Goal 1 (OT)    Activity/Device (Grooming Goal 1, OT) wash face, hands;oral care;hair care  -BRANDON        [place] (Grooming Goal 1, OT) tactile cues  required;verbal cues required;contact guard assist;standby assist  -BRANDON        Time Frame (Grooming Goal 1, OT) long term goal (LTG);10 days  -BRANDON        Progress/Outcome (Grooming Goal 1, OT) goal ongoing  -BRANDON            Activity Tolerance Goal 1 (OT)    Activity Tolerance Goal 1 (OT) with ADL task, ther. exer.  -BRANDON        Activity Level (Endurance Goal 1, OT) 15 min activity;O2 sat >/ equal to 88%   one rest  -BRANDON        Time Frame (Activity Tolerance Goal 1, OT) long term goal (LTG);10 days  -BRANDON        Progress/Outcome (Activity Tolerance Goal 1, OT) goal ongoing  -BRANDON           Functional Mobility Goal 1 (OT)    Activity/Assistive Device (Functional Mobility Goal 1, OT) walker, rolling  -BRANDON        [place] Level/Cues Needed (Functional Mobility Goal 1, OT) contact guard assist;verbal cues required  -BRANDON        Distance Goal 1 (Functional Mobility, OT) to bathroom and back to bed or chair  -BRANDON        Time Frame (Functional Mobility Goal 1, OT) long term goal (LTG);10 days  -BRANDON        Progress/Outcome (Functional Mobility Goal 1, OT) goal ongoing  -BRANDON           Living Environment    Home Accessibility stairs to enter home   wx in shower with seat and bars,  BSC over toilet  -BRANDON          User Key  (r) = Recorded By, (t) = Taken By, (c) = Cosigned By    Initials Name Effective Dates    Candy English, OT 06/08/18 -            Occupational Therapy Education     Title: PT OT SLP Therapies (Active)     Topic: Occupational Therapy (Active)     Point: ADL training (Done)     Description: Instruct learner(s) on proper safety adaptation and remediation techniques during self care or transfers.   Instruct in proper use of assistive devices.   Learning Progress Summary     Learner Status Readiness Method Response Comment Documented by    Patient Done Acceptance E VU,NR role OT, reason for consult, noted deficits, goals setting.  Pt. does not appear to fullly understand, spouse partiallly and g.son fully. BRANDON 08/19/18 8296     Family Done Acceptance E VU,NR role OT, reason for consult, noted deficits, goals setting.  Pt. does not appear to fullly understand, spouse partiallly and g.son fully.  08/19/18 1050          Point: Precautions (Done)     Description: Instruct learner(s) on prescribed precautions during self-care and functional transfers.   Learning Progress Summary     Learner Status Readiness Method Response Comment Documented by    Patient Done Acceptance E VU,NR role OT, reason for consult, noted deficits, goals setting.  Pt. does not appear to fullly understand, spouse partiallly and g.son fully.  08/19/18 1050    Family Done Acceptance E VU,NR role OT, reason for consult, noted deficits, goals setting.  Pt. does not appear to fullly understand, spouse partiallly and g.son fully.  08/19/18 1050                      User Key     Initials Effective Dates Name Provider Type Discipline     06/08/18 -  Candy Taveras, OT Occupational Therapist OT                  OT Recommendation and Plan  Outcome Summary/Treatment Plan (OT)  Anticipated Equipment Needs at Discharge (OT):  (possible w/c use if not already available if can return home)  Anticipated Discharge Disposition (OT): skilled nursing facility, inpatient rehabilitation facility (question ability to return home under only  care)  Planned Therapy Interventions (OT Eval): activity tolerance training, BADL retraining, cognitive/visual perception retraining, functional balance retraining, occupation/activity based interventions, patient/caregiver education/training, strengthening exercise, transfer/mobility retraining  Therapy Frequency (OT Eval): daily  Plan of Care Review  Plan of Care Reviewed With: patient, spouse, family  Plan of Care Reviewed With: patient, spouse, family  Outcome Summary: OT evaluation completed.  Pt. mod to sup to sit, dep to scoot to EOB.  Mod to stand and min to side step to HOB.  Pt. unable to stand long.  Once supine noted Low BP,  but pt. reporting no symptoms.  Once supine fully BP returned to normal.  Appears mod to max all ADL task.  Pt. appropriate for skilled OT services to assist with return to PLOF.  Pt., will likely need SNF at discharge and then still question spouse ability to care for pt. alone.          Outcome Measures     Row Name 08/19/18 1000             How much help from another is currently needed...    Putting on and taking off regular lower body clothing? 1  -BRANDON      Bathing (including washing, rinsing, and drying) 2  -BRANDON      Toileting (which includes using toilet bed pan or urinal) 1  -BRANDON      Putting on and taking off regular upper body clothing 2  -BRANDON      Taking care of personal grooming (such as brushing teeth) 2  -BRANDON      Eating meals 2  -BRANDON      Score 10  -BRANDON         Functional Assessment    Outcome Measure Options AM-PAC 6 Clicks Daily Activity (OT)  -BRANDON        User Key  (r) = Recorded By, (t) = Taken By, (c) = Cosigned By    Initials Name Provider Type    Candy English OT Occupational Therapist          Time Calculation:   OT Start Time: 1000  Therapy Suggested Charges     Code   Minutes Charges    None           Therapy Charges for Today     Code Description Service Date Service Provider Modifiers Qty    [id number]  OT EVAL LOW COMPLEXITY 4 8/19/2018 Candy Taveras OT GO 1    [id number]  OT SELFCARE CURRENT 8/19/2018 Candy Taveras OT GO, CL 1    [id number]  OT SELFCARE PROJECTED 8/19/2018 Candy Taveras OT GO, CK 1          OT G-codes  OT Professional Judgement Used?: Yes  OT Functional Scales Options: AM-PAC 6 Clicks Daily Activity (OT)  Score: 10  Functional Limitation: Self care  Self Care Current Status (): At least 60 percent but less than 80 percent impaired, limited or restricted  Self Care Goal Status (): At least 40 percent but less than 60 percent impaired, limited or restricted    Candy Taveras OT  8/19/2018

## 2018-08-20 ENCOUNTER — APPOINTMENT (OUTPATIENT)
Dept: GENERAL RADIOLOGY | Facility: HOSPITAL | Age: 83
End: 2018-08-20

## 2018-08-20 LAB
ANION GAP SERPL CALCULATED.3IONS-SCNC: 5 MMOL/L (ref 3–11)
BASOPHILS # BLD AUTO: 0.01 10*3/MM3 (ref 0–0.2)
BASOPHILS NFR BLD AUTO: 0.1 % (ref 0–1)
BUN BLD-MCNC: 12 MG/DL (ref 9–23)
BUN/CREAT SERPL: 16.2 (ref 7–25)
CALCIUM SPEC-SCNC: 6.8 MG/DL (ref 8.7–10.4)
CHLORIDE SERPL-SCNC: 102 MMOL/L (ref 99–109)
CO2 SERPL-SCNC: 19 MMOL/L (ref 20–31)
CREAT BLD-MCNC: 0.74 MG/DL (ref 0.6–1.3)
DEPRECATED RDW RBC AUTO: 54.6 FL (ref 37–54)
EOSINOPHIL # BLD AUTO: 0.1 10*3/MM3 (ref 0–0.3)
EOSINOPHIL NFR BLD AUTO: 0.9 % (ref 0–3)
ERYTHROCYTE [DISTWIDTH] IN BLOOD BY AUTOMATED COUNT: 16.9 % (ref 11.3–14.5)
GFR SERPL CREATININE-BSD FRML MDRD: 74 ML/MIN/1.73
GLUCOSE BLD-MCNC: 100 MG/DL (ref 70–100)
HCT VFR BLD AUTO: 31.2 % (ref 34.5–44)
HGB BLD-MCNC: 10.3 G/DL (ref 11.5–15.5)
IMM GRANULOCYTES # BLD: 0.08 10*3/MM3 (ref 0–0.03)
IMM GRANULOCYTES NFR BLD: 0.7 % (ref 0–0.6)
LYMPHOCYTES # BLD AUTO: 0.45 10*3/MM3 (ref 0.6–4.8)
LYMPHOCYTES NFR BLD AUTO: 4.1 % (ref 24–44)
MCH RBC QN AUTO: 29.6 PG (ref 27–31)
MCHC RBC AUTO-ENTMCNC: 33 G/DL (ref 32–36)
MCV RBC AUTO: 89.7 FL (ref 80–99)
MONOCYTES # BLD AUTO: 0.73 10*3/MM3 (ref 0–1)
MONOCYTES NFR BLD AUTO: 6.7 % (ref 0–12)
NEUTROPHILS # BLD AUTO: 9.62 10*3/MM3 (ref 1.5–8.3)
NEUTROPHILS NFR BLD AUTO: 88.2 % (ref 41–71)
PLATELET # BLD AUTO: 308 10*3/MM3 (ref 150–450)
PMV BLD AUTO: 9.1 FL (ref 6–12)
POTASSIUM BLD-SCNC: 4.5 MMOL/L (ref 3.5–5.5)
PROCALCITONIN SERPL-MCNC: 0.12 NG/ML
RBC # BLD AUTO: 3.48 10*6/MM3 (ref 3.89–5.14)
SODIUM BLD-SCNC: 126 MMOL/L (ref 132–146)
WBC NRBC COR # BLD: 10.91 10*3/MM3 (ref 3.5–10.8)

## 2018-08-20 PROCEDURE — 97535 SELF CARE MNGMENT TRAINING: CPT

## 2018-08-20 PROCEDURE — 85025 COMPLETE CBC W/AUTO DIFF WBC: CPT | Performed by: PHYSICIAN ASSISTANT

## 2018-08-20 PROCEDURE — 80048 BASIC METABOLIC PNL TOTAL CA: CPT | Performed by: INTERNAL MEDICINE

## 2018-08-20 PROCEDURE — 99232 SBSQ HOSP IP/OBS MODERATE 35: CPT | Performed by: PHYSICIAN ASSISTANT

## 2018-08-20 PROCEDURE — 25010000002 HEPARIN (PORCINE) PER 1000 UNITS: Performed by: INTERNAL MEDICINE

## 2018-08-20 PROCEDURE — 97110 THERAPEUTIC EXERCISES: CPT

## 2018-08-20 PROCEDURE — 84145 PROCALCITONIN (PCT): CPT | Performed by: PHYSICIAN ASSISTANT

## 2018-08-20 PROCEDURE — 71045 X-RAY EXAM CHEST 1 VIEW: CPT

## 2018-08-20 RX ADMIN — DILTIAZEM HYDROCHLORIDE 120 MG: 120 CAPSULE, EXTENDED RELEASE ORAL at 08:40

## 2018-08-20 RX ADMIN — LEVOTHYROXINE SODIUM 75 MCG: 75 TABLET ORAL at 08:40

## 2018-08-20 RX ADMIN — QUETIAPINE FUMARATE 6.25 MG: 25 TABLET ORAL at 21:27

## 2018-08-20 RX ADMIN — HEPARIN SODIUM 5000 UNITS: 5000 INJECTION, SOLUTION INTRAVENOUS; SUBCUTANEOUS at 21:27

## 2018-08-20 RX ADMIN — HEPARIN SODIUM 5000 UNITS: 5000 INJECTION, SOLUTION INTRAVENOUS; SUBCUTANEOUS at 14:37

## 2018-08-20 RX ADMIN — PANTOPRAZOLE SODIUM 40 MG: 40 TABLET, DELAYED RELEASE ORAL at 08:40

## 2018-08-20 RX ADMIN — HEPARIN SODIUM 5000 UNITS: 5000 INJECTION, SOLUTION INTRAVENOUS; SUBCUTANEOUS at 05:18

## 2018-08-20 RX ADMIN — SODIUM CHLORIDE 100 ML/HR: 9 INJECTION, SOLUTION INTRAVENOUS at 04:00

## 2018-08-20 NOTE — PLAN OF CARE
Problem: Patient Care Overview  Goal: Plan of Care Review  Outcome: Ongoing (interventions implemented as appropriate)   08/20/18 1402   Coping/Psychosocial   Plan of Care Reviewed With patient   Plan of Care Review   Progress improving   OTHER   Outcome Summary patient able to maintain static sitting balance at EOB with mod>min assist while performing B LE exercises, patient is fearfull of falling and needs reassurance with sit<>stand transfers, patient declined OOB to chair.

## 2018-08-20 NOTE — PLAN OF CARE
Problem: Patient Care Overview  Goal: Plan of Care Review  Outcome: Ongoing (interventions implemented as appropriate)   08/20/18 1413   Coping/Psychosocial   Plan of Care Reviewed With patient   Plan of Care Review   Progress no change   OTHER   Outcome Summary Continues with fear of falling, stood less time today and pushing to rear. Good participation grooming and ther. exer.

## 2018-08-20 NOTE — PLAN OF CARE
Problem: Patient Care Overview  Goal: Plan of Care Review  Outcome: Ongoing (interventions implemented as appropriate)   08/20/18 0017   Coping/Psychosocial   Plan of Care Reviewed With other (see comments)  (ARTEM Patrick)   Plan of Care Review   Progress improving   OTHER   Outcome Summary WOC consult for skin tear to right calf and left elbow, and coccyx. Pt has h/o multiple falls. Both skin tears cleaned with blue wipes, xeroform and dry dressing placed. Coccyx - gluteal cleft has area that is blanchable redness, no pressure injures noted. Pt has limited mobility, pleasantly confused, will order ANDRY from [facility] [phone]. WOC nurse to follow as needed for high risk for skin breakdown. Please call with questions.

## 2018-08-20 NOTE — THERAPY TREATMENT NOTE
Acute Care - Occupational Therapy Treatment Note  [facility]     Patient Name: Eliane Zamora  : 1929  MRN: [id number]  Today's Date: 2018  Onset of Illness/Injury or Date of Surgery: 18  Date of Referral to OT: 18  Referring Physician: MD Shi    Admit Date: 2018       ICD-10-CM ICD-9-CM   1. Weakness R53.1 780.79   2. Dehydration E86.0 276.51   3. Dementia without behavioral disturbance, unspecified dementia type F03.90 294.20   4. Impaired mobility and ADLs Z74.09 799.89   5. Impaired functional mobility, balance, gait, and endurance Z74.09 V49.89     Patient Active Problem List   Diagnosis   • PAF (paroxysmal atrial fibrillation) (CMS/HCC)   • Hypothyroidism   • Abnormal stress echo   • Osteoporosis   • Hypercholesterolemia   • GERD (gastroesophageal reflux disease)   • Right lower lobe pneumonia (CMS/HCC)   • Dementia   • Leukocytosis   • Hypoglycemia   • Failure to thrive in adult   • Hyponatremia     Past Medical History:   Diagnosis Date   • Abnormal stress echo     History of abnormal stress echocardiogram with recent echocardiogram showing borderline LV function.    • Dementia    • GERD (gastroesophageal reflux disease)    • Hypercholesterolemia    • Hypothyroidism    • Osteoporosis     /Mild degenerative joint disease   • PAF (paroxysmal atrial fibrillation) (CMS/HCC)    • UTI (urinary tract infection)     Current urinary tract infection by laboratory value, 2013:  Less than 100,000 units/mL of E. coli.     Past Surgical History:   Procedure Laterality Date   • BREAST SURGERY      benign breast biopsy   •  SECTION         Therapy Treatment          Rehabilitation Treatment Summary     Row Name 18 1345 18 1339          Treatment Time/Intention    Discipline occupational therapist  -BRANDON physical therapy assistant  -AS     Document Type therapy note (daily note)  -BRANDON therapy note (daily note)  -AS     Subjective Information complains  of;fatigue;weakness  -BRANDON complains of;weakness;fatigue  -AS     Mode of Treatment occupational therapy   partial PT cotx for sit to stand, sit sup only  -BRANDON physical therapy  -AS     Patient/Family Observations Pt. sitting on EOB on arrival with PT.  PER PT no tech available so OT to assist with sit to stand.  Pt. with IV.  -BRANDON patient supine in bed, IV. Patient sleepy but easily aroused.  -AS     Care Plan Review care plan/treatment goals reviewed;risks/benefits reviewed  -BRANDON  --     Patient Effort adequate  -BRANDON adequate  -AS     Comment pt. BP taken EOB and stable today.  -BRANDON  --     Existing Precautions/Restrictions fall  -BRANDON fall;other (see comments)   monitor BP, complaints of weakness, confusion  -AS     Recorded by [BRANDON] Candy Taveras, OT 08/20/18 1416 [AS] Alyssa Kearney, PTA 08/20/18 1402     Row Name 08/20/18 1345 08/20/18 1339          Vital Signs    Pre Systolic BP Rehab 138  -BRANDON  --     Pre Treatment Diastolic BP 92   earlier Bp  -BRANDON  --     Intra Systolic BP Rehab 133  -BRANDON 133  -AS     Intra Treatment Diastolic BP 64  -BRANDON 64  -AS     Pretreatment Heart Rate (beats/min) 90  -BRANDON  --     Posttreatment Heart Rate (beats/min) 84  -BRANDON  --     Post SpO2 (%) 93  -BRANDON  --     O2 Delivery Post Treatment room air  -BRANDON  --     Pre Patient Position Sitting  -BRANDON  --     Intra Patient Position Standing  -BRANDON Sitting  -AS     Post Patient Position Supine  -BRANDON  --     Recorded by [BRANDON] Candy Taveras, OT 08/20/18 1416 [AS] Alyssa Kearney, PTA 08/20/18 1402     Row Name 08/20/18 1345 08/20/18 1339          Cognitive Assessment/Intervention- PT/OT    Affect/Mental Status (Cognitive) flat/blunted affect  -BRANDON flat/blunted affect  -AS     Orientation Status (Cognition) oriented to;person;disoriented to;situation;time   knew was in [place]. not place  -BRANDON oriented to;person;disoriented to;place;situation;verbal cues/prompts needed for orientation   knew [place] but not that she was in hospital  -AS     Follows  Commands (Cognition) follows one step commands;50-74% accuracy;repetition of directions required;verbal cues/prompting required;physical/tactile prompts required   needed tactile and visual cues ther. exer.  -BRANDON  --     Safety Deficit (Cognitive) moderate deficit;insight into deficits/self awareness;problem solving;judgment;safety precautions awareness;safety precautions follow-through/compliance  -BRANDON moderate deficit  -AS     Personal Safety Interventions fall prevention program maintained;gait belt;nonskid shoes/slippers when out of bed  -BRANDON fall prevention program maintained;gait belt;nonskid shoes/slippers when out of bed;other (see comments)   bed alarm  -AS     Recorded by [BRANDON] Candy Taveras, OT 08/20/18 1416 [AS] Alyssa Kearney, BHARAT 08/20/18 1402     Row Name 08/20/18 1345 08/20/18 1339          Bed Mobility Assessment/Treatment    Scooting/Bridging [place] (Bed Mobility) dependent (less than 25% patient effort);2 person assist   to HOB  -BRANDON verbal cues;dependent (less than 25% patient effort);2 person assist  -AS     Supine-Sit [place] (Bed Mobility)  -- verbal cues;moderate assist (50% patient effort)  -AS     Sit-Supine [place] (Bed Mobility) moderate assist (50% patient effort);2 person assist;nonverbal cues (demo/gesture);contact guard  -BRANDON verbal cues;moderate assist (50% patient effort);2 person assist  -AS     Bed Mobility, Safety Issues decreased use of arms for pushing/pulling;decreased use of legs for bridging/pushing;impaired trunk control for bed mobility;cognitive deficits limit understanding  -BRANDON decreased use of arms for pushing/pulling;decreased use of legs for bridging/pushing;cognitive deficits limit understanding  -AS     Assistive Device (Bed Mobility) draw sheet  -BRANDON head of bed elevated;draw sheet;bed rails  -AS     Comment (Bed Mobility)  -- patient need verbal cues for technique, assist at trunk to reach EOB.  -AS     Recorded by [BRANDON] Candy Taveras, OT 08/20/18  1416 [AS] RomeroAlyssa escoto, PTA 08/20/18 1402     Row Name 08/20/18 1345 08/20/18 1339          Sit-Stand Transfer    Sit-Stand [place] (Transfers) moderate assist (50% patient effort);2 person assist;nonverbal cues (demo/gesture);verbal cues  -BRANDON verbal cues;moderate assist (50% patient effort);2 person assist  -AS     Assistive Device (Sit-Stand Transfers) --   UE support and gait belt  -BRANDON other (see comments)   B UE support  -AS     Recorded by [BRANDON] Candy Taveras, OT 08/20/18 1416 [AS] RomeroAlyssa escoto, PTA 08/20/18 1402     Row Name 08/20/18 1345 08/20/18 1339          Stand-Sit Transfer    Stand-Sit [place] (Transfers) moderate assist (50% patient effort);2 person assist;verbal cues;nonverbal cues (demo/gesture)  -BRANDON verbal cues;moderate assist (50% patient effort);2 person assist  -AS     Assistive Device (Stand-Sit Transfers) --   BUE support  -BRANDON other (see comments)   B UE support  -AS     Recorded by [BRANDON] Candy Taveras, OT 08/20/18 1416 [AS] Caio Alyssa Lee, [facility] 08/20/18 1402     Row Name 08/20/18 1345             ADL Assessment/Intervention    94628 - OT Self Care/Mgmt Minutes 5  -BRANDON      BADL Assessment/Intervention grooming;feeding  -BRANDON      Recorded by [BRANDON] Candy Taveras, OT 08/20/18 1416      Row Name 08/20/18 1345             Grooming Assessment/Training    [place] Level (Grooming) hair care, combing/brushing;moderate assist (50% patient effort)  -BRANDON      Grooming Position supported sitting  -BRANDON      Comment (Grooming) pt. fatigued and got frustrated with tangles.  -BRNADON      Recorded by [BRANDON] Candy Taveras, OT 08/20/18 1416      Row Name 08/20/18 1345             Self-Feeding Assessment/Training    [place] Level (Feeding) liquids to mouth;minimum assist (75% patient effort)  -BRANDON      Self-Feeding Assess/Train, Spillage Amount minimal   pt., squeezing boost box and some spillage  -BRANDON      Position (Self-Feeding) sitting up in bed  -BRANDON      Comment (Feeding) Pt.  brought to mouth, but difficulty getting straw in mouth.  Pt. shown how to use both hands to assist.  -BRANDON      Recorded by [BRANDON] Candy Taveras, OT 08/20/18 1416      Row Name 08/20/18 1345             BADL Safety/Performance    Impairments, BADL Safety/Performance balance;endurance/activity tolerance;cognition;strength;trunk/postural control  -BRANDON      Cognitive Impairments, BADL Safety/Performance insight into deficits/self awareness;judgment;problem solving/reasoning;safety precaution awareness;safety precaution follow-through  -BRANDON      Skilled BADL Treatment/Intervention cognitive/safety deficit modifications  -BRANDON      Recorded by [BRANDON] Candy Taveras, OT 08/20/18 1416      Row Name 08/20/18 1339             Motor Skills Assessment/Interventions    Additional Documentation Therapeutic Exercise (Group)  -AS      Recorded by [AS] Alyssa Kearney, PTA 08/20/18 1402      Row Name 08/20/18 1345 08/20/18 1339          Therapeutic Exercise    93103 - PT Therapeutic Exercise Minutes  -- 18  -AS     89215 - OT Therapeutic Exercise Minutes 3  -BRANDON  --     41156 - OT Therapeutic Activity Minutes 2  -BRANDON  --     Recorded by [BRANDON] Candy Taveras, OT 08/20/18 1416 [AS] Alyssa Kearney, PTA 08/20/18 1402     Row Name 08/20/18 1345 08/20/18 1339          Therapeutic Exercise    Upper Extremity Range of Motion (Therapeutic Exercise) shoulder flexion/extension, bilateral;shoulder abduction/adduction, bilateral;shoulder horizontal abduction/adduction, bilateral;elbow flexion/extension, bilateral   scapula elevation and retraction  -BRANDON  --     Lower Extremity Range of Motion (Therapeutic Exercise)  -- hip flexion/extension, bilateral;knee flexion/extension, bilateral;ankle dorsiflexion/plantar flexion, bilateral  -AS     Exercise Type (Therapeutic Exercise) AROM (active range of motion)  -BRANDON AROM (active range of motion)  -AS     Position (Therapeutic Exercise) supine  -BRANDON seated  -AS     Sets/Reps (Therapeutic Exercise) 1/10   -BRANDON 1/15   with rest breaks  -AS     Comment (Therapeutic Exercise) tactile, visual  and verbal cues needed  -BRANDON  --     Recorded by [BRANDON] Candy Taveras, OT 08/20/18 1416 [AS] Alyssa Kearney, PTA 08/20/18 1402     Row Name 08/20/18 1345 08/20/18 1339          Static Sitting Balance    Level of [place] (Unsupported Sitting, Static Balance) moderate assist, 50 to 74% patient effort  -BRANDON moderate assist, 50 to 74% patient effort;other (see comments)   progressed to min assist with verbal and tactile cueing  -AS     Sitting Position (Unsupported Sitting, Static Balance) sitting on edge of bed  -BRANDON sitting on edge of bed  -AS     Time Able to Maintain Position (Unsupported Sitting, Static Balance) 1 to 2 minutes   had already sat some with PT  -BRANDON more than 5 minutes  -AS     Comment (Unsupported Sitting, Static Balance)  -- performed B LE exercises seated EOB.  -AS     Recorded by [BRANDON] Candy Taveras, OT 08/20/18 1416 [AS] Alyssa Kearney, PTA 08/20/18 1402     Row Name 08/20/18 1345             Static Standing Balance    Level of [place] (Supported Standing, Static Balance) maximal assist, 25 to 49% patient effort   2  -BRANDON      Time Able to Maintain Position (Supported Standing, Static Balance) 15 to 30 seconds  -BRANDON      Assistive Device Utilized (Supported Standing, Static Balance) other (see comments)   BUE support  -BRANDON      Comment (Unsupported Standing, Static Balance) as soon as pt. to stand wanting to sit and with postrer lean and fear of falling.  -BRANDON      Recorded by [BRANDON] Candy Taveras, OT 08/20/18 1416      Row Name 08/20/18 1345 08/20/18 1339          Positioning and Restraints    Pre-Treatment Position in bed   EOB with PT  -BRANDON in bed  -AS     Post Treatment Position bed  -BRANDON bed  -AS     In Bed supine;call light within reach;encouraged to call for assist;exit alarm on  -BRANDON supine;call light within reach;encouraged to call for assist;exit alarm on;with OT  -AS     Recorded by [BRANDON]  Candy Taveras, OT 08/20/18 1416 [AS] RomeroRickie escotojuana Lee, PTA 08/20/18 1402     Row Name 08/20/18 1345 08/20/18 1339          Pain Scale: Numbers Pre/Post-Treatment    Pain Scale: Numbers, Pretreatment 0/10 - no pain  -BRANDON 0/10 - no pain  -AS     Pain Scale: Numbers, Post-Treatment 0/10 - no pain  -BRANDON 0/10 - no pain  -AS     Recorded by [BRANDON] Candy Taveras, OT 08/20/18 1416 [AS] Alyssa Kearney, PTA 08/20/18 1402     Row Name                Wound 08/19/18 1900 coccyx pressure injury    Wound - Properties Group Date first assessed: 08/19/18 [JK] Time first assessed: 1900 [JK] Present On Admission : yes [JK] Location: coccyx [JK] Type: pressure injury [JK] Stage, Pressure Injury: Stage 1 [JK] Recorded by:  [JK] Marly Perla RN 08/20/18 0155    Row Name                Wound 08/19/18 2000 Left elbow skin tear    Wound - Properties Group Date first assessed: 08/19/18 [JK] Time first assessed: 2000 [JK] Side: Left [JK] Location: elbow [JK] Type: skin tear [JK] Recorded by:  [JK] Marly Perla RN 08/20/18 0157    Row Name                Wound 08/19/18 2000 Right calf skin tear    Wound - Properties Group Date first assessed: 08/19/18 [JK] Time first assessed: 2000 [JK] Side: Right [JK] Location: calf [JK] Type: skin tear [JK] Recorded by:  [JK] Marly Perla RN 08/20/18 0158    Row Name 08/20/18 1345             Plan of Care Review    Plan of Care Reviewed With patient  -BRANDON      Recorded by [BRANDON] Candy Taveras, OT 08/20/18 1416      Row Name 08/20/18 1345             Outcome Summary/Treatment Plan (OT)    Daily Summary of Progress (OT) progress towards functional goals is fair  -BRANDON      Barriers to Overall Progress (OT) confusion, fear of falling, poor endurance  -BRANDON      Plan for Continued Treatment (OT) Cont OT POC as pt. tolerates and willing  -BRANDON      Recorded by [BRANDON] Candy Taveras, OT 08/20/18 2493        User Key  (r) = Recorded By, (t) = Taken By, (c) = Cosigned By    Initials Name  Effective Dates Discipline    BRANDON Nitin Candy Ying, OT 06/08/18 -  OT    AS Romerotigist Alyssa Rosa, PTA 06/22/15 -  PT    Marly Woodson RN 06/16/16 -  Nurse        Wound 08/19/18 1900 coccyx pressure injury (Active)   Dressing Appearance open to air 8/20/2018  6:00 AM   Base red/granulating 8/19/2018  8:00 PM   Periwound pink;blanchable 8/19/2018  8:00 PM   Care, Wound cleansed with;other (see comments) 8/19/2018  8:00 PM   Dressing Care, Wound skin barrier agent applied 8/19/2018  8:00 PM       Wound 08/19/18 2000 Left elbow skin tear (Active)   Dressing Appearance dry;intact 8/20/2018  8:00 AM   Closure Adhesive bandage 8/20/2018  8:00 AM   Base red/granulating 8/19/2018  8:00 PM   Periwound pink;blanchable 8/19/2018  8:00 PM   Care, Wound cleansed with;sterile normal saline 8/19/2018  8:00 PM   Dressing Care, Wound dressing applied;foam;low-adherent 8/19/2018  8:00 PM       Wound 08/19/18 2000 Right calf skin tear (Active)   Dressing Appearance dry;intact 8/20/2018  8:00 AM   Closure Adhesive bandage 8/20/2018  8:00 AM   Base red/granulating 8/19/2018  8:00 PM   Periwound pink;blanchable 8/19/2018  8:00 PM   Care, Wound cleansed with;sterile normal saline 8/19/2018  8:00 PM   Dressing Care, Wound dressing applied;foam;low-adherent 8/19/2018  8:00 PM             OT Rehab Goals     Row Name 08/20/18 5309             Bed Mobility Goal 1 (OT)    Progress/Outcomes (Bed Mobility Goal 1, OT) goal ongoing  -BRANDON         Transfer Goal 1 (OT)    Progress/Outcome (Transfer Goal 1, OT) goal ongoing  -BRANDON         Toileting Goal 1 (OT)    Progress/Outcome (Toileting Goal 1, OT) goal ongoing   did not need to use this session  -BRANDON         Grooming Goal 1 (OT)    Progress/Outcome (Grooming Goal 1, OT) goal ongoing   did comb hair mod assist and drink min  -BRANDON        User Key  (r) = Recorded By, (t) = Taken By, (c) = Cosigned By    Initials Name Provider Type Discipline    Candy English, OT Occupational Therapist OT         Occupational Therapy Education     Title: PT OT SLP Therapies (Active)     Topic: Occupational Therapy (Active)     Point: ADL training (Active)     Description: Instruct learner(s) on proper safety adaptation and remediation techniques during self care or transfers.   Instruct in proper use of assistive devices.   Learning Progress Summary     Learner Status Readiness Method Response Comment Documented by    Patient Active Acceptance E,D NR balance posture standing, ADL completion of task, benefits of activity, UE ther.exer.  08/20/18 1416     Done Acceptance E VU,NR role OT, reason for consult, noted deficits, goals setting.  Pt. does not appear to fullly understand, spouse partiallly and g.son fully.  08/19/18 1050    Family Done Acceptance E VU,NR role OT, reason for consult, noted deficits, goals setting.  Pt. does not appear to fullly understand, spouse partiallly and g.son fully.  08/19/18 1050          Point: Home exercise program (Active)     Description: Instruct learner(s) on appropriate technique for monitoring, assisting and/or progressing therapeutic exercises/activities.   Learning Progress Summary     Learner Status Readiness Method Response Comment Documented by    Patient Active Acceptance E,D NR balance posture standing, ADL completion of task, benefits of activity, UE ther.exer.  08/20/18 1416          Point: Precautions (Active)     Description: Instruct learner(s) on prescribed precautions during self-care and functional transfers.   Learning Progress Summary     Learner Status Readiness Method Response Comment Documented by    Patient Active Acceptance E,D NR balance posture standing, ADL completion of task, benefits of activity, UE ther.exer.  08/20/18 1416     Done Acceptance E VU,NR role OT, reason for consult, noted deficits, goals setting.  Pt. does not appear to fullly understand, spouse partiallly and g.son fully. BRANDON 08/19/18 1050    Family Done Acceptance E VU,NR role OT,  reason for consult, noted deficits, goals setting.  Pt. does not appear to fullly understand, spouse partiallly and g.son fully.  08/19/18 1050                      User Key     Initials Effective Dates Name Provider Type Discipline     06/08/18 -  Candy Taveras, OT Occupational Therapist OT                OT Recommendation and Plan  Outcome Summary/Treatment Plan (OT)  Daily Summary of Progress (OT): progress towards functional goals is fair  Barriers to Overall Progress (OT): confusion, fear of falling, poor endurance  Plan for Continued Treatment (OT): Cont OT POC as pt. tolerates and willing  Anticipated Equipment Needs at Discharge (OT):  (possible w/c use if not already available if can return home)  Anticipated Discharge Disposition (OT): skilled nursing facility, inpatient rehabilitation facility (question ability to return home under only  care)  Planned Therapy Interventions (OT Eval): activity tolerance training, BADL retraining, cognitive/visual perception retraining, functional balance retraining, occupation/activity based interventions, patient/caregiver education/training, strengthening exercise, transfer/mobility retraining  Therapy Frequency (OT Eval): daily  Daily Summary of Progress (OT): progress towards functional goals is fair  Plan of Care Review  Plan of Care Reviewed With: patient  Plan of Care Reviewed With: patient  Outcome Summary: Continues with fear of falling, stood less time today and pushing to rear.  Good participation grooming and ther. exer.        Outcome Measures     Row Name 08/20/18 1345 08/20/18 1339 08/19/18 1144       How much help from another person do you currently need...    Turning from your back to your side while in flat bed without using bedrails?  -- 2  -AS 2  -DM    Moving from lying on back to sitting on the side of a flat bed without bedrails?  -- 2  -AS 2  -DM    Moving to and from a bed to a chair (including a wheelchair)?  -- 2  -AS 1  -DM     Standing up from a chair using your arms (e.g., wheelchair, bedside chair)?  -- 2  -AS 2  -DM    Climbing 3-5 steps with a railing?  -- 1  -AS 1  -DM    To walk in hospital room?  -- 1  -AS 1  -DM    AM-PAC 6 Clicks Score  -- 10  -AS 9  -DM       How much help from another is currently needed...    Putting on and taking off regular lower body clothing? 1  -BRANDON  --  --    Bathing (including washing, rinsing, and drying) 2  -BRANDON  --  --    Toileting (which includes using toilet bed pan or urinal) 1  -BRANDON  --  --    Putting on and taking off regular upper body clothing 2  -BRANDON  --  --    Taking care of personal grooming (such as brushing teeth) 2  -BRANDON  --  --    Eating meals 2  -BRANDON  --  --    Score 10  -BRANDON  --  --       Functional Assessment    Outcome Measure Options AM-PAC 6 Clicks Daily Activity (OT)  -BRANDON AM-PAC 6 Clicks Basic Mobility (PT)  -AS AM-PAC 6 Clicks Basic Mobility (PT)  -DM    Row Name 08/19/18 1000             How much help from another is currently needed...    Putting on and taking off regular lower body clothing? 1  -BRANDON      Bathing (including washing, rinsing, and drying) 2  -BRANDON      Toileting (which includes using toilet bed pan or urinal) 1  -BRANDON      Putting on and taking off regular upper body clothing 2  -BRANDON      Taking care of personal grooming (such as brushing teeth) 2  -BRANDON      Eating meals 2  -BRANDON      Score 10  -BRANDON         Functional Assessment    Outcome Measure Options AM-PAC 6 Clicks Daily Activity (OT)  -BRANDON        User Key  (r) = Recorded By, (t) = Taken By, (c) = Cosigned By    Initials Name Provider Type    Candy English, OT Occupational Therapist    Lashanda Rodríguez, PT Physical Therapist    AS Alyssa Kearney, PTA Physical Therapy Assistant           Time Calculation:         Time Calculation- OT     Row Name 08/20/18 1419 08/20/18 1345          Time Calculation- OT    OT Start Time 1345  -BRANDON  --     OT Received On 08/20/18  -BRANDON  --     OT Goal Re-Cert Due Date 08/29/18  -BRANDON   --        Timed Charges    41484 - OT Therapeutic Exercise Minutes  -- 3  -BRANDON     10246 - OT Therapeutic Activity Minutes  -- 2  -BRANDON     53115 - OT Self Care/Mgmt Minutes  -- 5  -BRANDON       User Key  (r) = Recorded By, (t) = Taken By, (c) = Cosigned By    Initials Name Provider Type    Candy English OT Occupational Therapist           Therapy Suggested Charges     Code   Minutes Charges    14482 (CPT®) Hc Ot Neuromusc Re Education Ea 15 Min      03512 (CPT®) Hc Ot Ther Proc Ea 15 Min 3     11774 (CPT®) Hc Ot Therapeutic Act Ea 15 Min 2     66331 (CPT®) Hc Ot Manual Therapy Ea 15 Min      09310 (CPT®) Hc Ot Iontophoresis Ea 15 Min      61337 (CPT®) Hc Ot Elec Stim Ea-Per 15 Min      63076 (CPT®) Hc Ot Ultrasound Ea 15 Min      81407 (CPT®) Hc Ot Self Care/Mgmt/Train Ea 15 Min 5 1    Total  10 1        Therapy Charges for Today     Code Description Service Date Service Provider Modifiers Qty    64616142715 HC OT EVAL LOW COMPLEXITY 4 8/19/2018 Candy Taveras, OT GO 1    76864731043 HC OT SELFCARE CURRENT 8/19/2018 Candy Taveras, OT GO, CL 1    30701942764 HC OT SELFCARE PROJECTED 8/19/2018 Candy Taveras OT GO, CK 1    43141591708 HC OT SELF CARE/MGMT/TRAIN EA 15 MIN 8/20/2018 Candy Taveras OT GO 1          OT G-codes  OT Professional Judgement Used?: Yes  OT Functional Scales Options: AM-PAC 6 Clicks Daily Activity (OT)  Score: 10  Functional Limitation: Self care  Self Care Current Status (): At least 60 percent but less than 80 percent impaired, limited or restricted  Self Care Goal Status (): At least 40 percent but less than 60 percent impaired, limited or restricted    Candy Taveras OT  8/20/2018

## 2018-08-20 NOTE — PROGRESS NOTES
[facility] Medicine Services  PROGRESS NOTE    Patient Name: Eliane Zamora  : 1929  MRN: [id number]    Date of Admission: 2018  Length of Stay: 0  Primary Care Physician: Jack Chilel MD    Subjective     CC: failure to thrive    HPI:  No new issues, finished breakfast and is awake, alert and conversant. She has no complaints and is feeling well. She denies chest pain, endorses intermittent, mild dyspnea. No nausea or vomiting.     Review of Systems  Gen- No fevers, chills  CV- No chest pain, palpitations  Resp- No cough, dyspnea  GI- No N/V/D, abd pain    Otherwise ROS is negative other than as per HPI    Objective     Vital Signs:   Temp:  [98 °F (36.7 °C)-99.1 °F (37.3 °C)] 99.1 °F (37.3 °C)  Heart Rate:  [63-77] 74  Resp:  [16-18] 18  BP: (115-130)/(55-88) 124/66      Physical Exam:  Constitutional: No acute distress, awake, alert  HENT: NCAT, mucous membranes moist  Respiratory: Clear to auscultation bilaterally, respiratory effort normal. Intermittent hypoxia at rest on sPO2 monitor, as low as 87% on room air.   Cardiovascular: RRR, no murmurs, rubs, or gallops, palpable pedal pulses bilaterally  Gastrointestinal: Positive bowel sounds, soft, nontender, nondistended  Musculoskeletal: No bilateral ankle edema  Psychiatric: Appropriate affect. She is pleasant, calm and cooperative with exam.   Neurologic: Oriented to self only, no focal deficits  Skin: No rashes    Results Reviewed:  I have personally reviewed current lab, radiology, and data and agree.      Results from last 7 days  Lab Units 18  0555 18  0945   WBC 10*3/mm3 13.03* 15.31*   HEMOGLOBIN g/dL 10.1* 12.0   HEMATOCRIT % 29.9* 35.9   PLATELETS 10*3/mm3 311 345       Results from last 7 days  Lab Units 18  0451 18  0555 18  0945   SODIUM mmol/L 126* 124* 131*   POTASSIUM mmol/L 4.5 4.5 4.7   CHLORIDE mmol/L 102 97* 98*   CO2 mmol/L 19.0* 24.0 24.0   BUN mg/dL 12 14 18    CREATININE mg/dL 0.74 0.93 1.07   GLUCOSE mg/dL 100 115* 85   CALCIUM mg/dL 6.8* 7.4* 8.3*   ALT (SGPT) U/L  --   --  28   AST (SGOT) U/L  --   --  25     Estimated Creatinine Clearance: 36.4 mL/min (by C-G formula based on SCr of 0.74 mg/dL).  No results found for: BNP    Microbiology Results Abnormal     None        Imaging Results (last 24 hours)     ** No results found for the last 24 hours. **        Results for orders placed during the hospital encounter of 06/07/18   Adult Transthoracic Echo Complete W/ Cont if Necessary Per Protocol    Narrative · Left ventricular systolic function is normal.  · Estimated EF appears to be in the range of 66 - 70%.  · Left atrial cavity size is mildly dilated.        I have reviewed the medications.      diltiaZEM  mg Oral Q24H   heparin (porcine) 5,000 Units Subcutaneous Q8H   levothyroxine 75 mcg Oral Daily   pantoprazole 40 mg Oral Daily   QUEtiapine 6.25 mg Oral Nightly     Assessment / Plan     Hospital Problem List     * (Principal)Failure to thrive in adult    PAF (paroxysmal atrial fibrillation) (CMS/[facility])    Overview Addendum 11/16/2016 10:33 AM by Josefina Luis     1. Paroxysmal atrial fibrillation:  a. Rhythm control with sotalol.  b. Excessive bradyarrhythmias associated with digoxin.     Her Holter monitor showed essentially sinus rhythm with no excessive bradycardia and no excessive tachycardia.  Her EKG today shows sinus rhythm, corrected QT interval of 418 milliseconds. 05/07/15         Hypothyroidism    Overview Signed 11/16/2016 10:32 AM by Josefina Luis     On chronic supplementation         Dementia    Leukocytosis    Hypoglycemia    Hyponatremia         Brief Hospital Course to date:  89 yof with hx of dementia presents with worsening confusion and falls, admitted with adult failure to thrive. Admitted to [facility] in June 2018 for RLL pneumonia, discharged to rehab. Since returning home, multiple falls, poor PO intake and progressive decline.   having difficulty caring for her at home.      Assessment & Plan:    Acute encephalopathy, superimposed on chronic dementia  Leukocytosis  - Seems improved today. She is confused at baseline   - No infectious etiology on initial work up. CXR/UA negative. Will repeat CXR, CBC and procalcitonin today   - Leukocytosis improving   - Mildly hypoxic on O2 monitor. Admitted here in June 2018 with RLL pneumonia     Frequent falls  Adult failure to thrive  Malnutrition  - PO intake improving   - Nutrition following, supplements with meals. She did drink all of her boost with breakfast this morning     Hypoglycemia, resolved    Hyponatremia  - suspect secondary to hypovolemia. Decrease IVFs to 75mL/hr today given her improved PO intake. Repeat AM BMP.    Hypothyroidism  - TSH up to 18 but normal T4. Suspect non-compliance. Continue home dose.     Paroxsymal atrial fibrillation  - rate controlled. Continue Diltiazem. No AC due to falls and age.     DVT prophylaxis: Lovenox   CODE STATUS:   Code Status and Medical Interventions:   Ordered at: 08/18/18 1427     Level Of Support Discussed With:    Next of Kin (If No Surrogate)     Code Status:    No CPR     Medical Interventions (Level of Support Prior to Arrest):    Full     Disposition: Case management consulted. Unlikely that spouse will be able to care for patient moving forward. She will likely need long-term placement.     Electronically signed by Ana Clarke PA-C, 08/20/18, 11:14 AM.    I have reviewed the documentation above and agree.

## 2018-08-20 NOTE — PROGRESS NOTES
Discharge Planning Assessment  [facility]     Patient Name: Eliane Zamora  MRN: [id number]  Today's Date: 8/20/2018    Admit Date: 8/18/2018          Discharge Needs Assessment     Row Name 08/20/18 1310       Living Environment    Lives With spouse    Name(s) of Who Lives With Patient Chuck-spouse    Current Living Arrangements home/apartment/condo    Primary Care Provided by spouse/significant other;child(horacio)    Provides Primary Care For no one, unable/limited ability to care for self    Family Caregiver if Needed child(horacio), adult;other (see comments)   Dtr Monika assists    Family Caregiver Names Chuck ZamoraMonika    Quality of Family Relationships helpful;involved;supportive    Able to Return to Prior Arrangements other (see comments)    Living Arrangement Comments Pt's  is 89 and has mild-moderate dimentia.        Resource/Environmental Concerns    Resource/Environmental Concerns none    Transportation Concerns car, none       Transition Planning    Patient/Family Anticipates Transition to inpatient rehabilitation facility    Patient/Family Anticipated Services at Transition ;skilled nursing    Transportation Anticipated family or friend will provide       Discharge Needs Assessment    Concerns to be Addressed discharge planning;decision making    Concerns Comments Pt's spouse has trouble helping her physically    Equipment Currently Used at Home walker, standard;cane, straight;grab bar;commode    Anticipated Changes Related to Illness inability to care for self    Equipment Needed After Discharge none    Outpatient/Agency/Support Group Needs skilled nursing facility    Discharge Facility/Level of Care Needs nursing facility, skilled;nursing facility, intermediate    Current Discharge Risk chronically ill;cognitively impaired;dependent with mobility/activities of daily living            Discharge Plan     Row Name 08/20/18 0644       Plan    Plan SNF    Patient/Family in  Agreement with Plan yes    Plan Comments Spoke to pt at  and she can be confused, asking for her . Pt was admitted at [facility] in June and went to [facility] after. She was released from [place] on July 4th with [facility]. Per Jose, pt has recently been released from [facility]. Spoke to pt's daughter Monika and she is POA. She feels her dad would be agreeable with more rehab but unsure if he would be agreeable to long term care. She states she doesn't want to go against his wishes. She realizes pt is in her 80/20 copay days and would like a referral back to [facility] for skilled care. Called referral to Tamanna at [facility]. CM will cont to follow.     Final Discharge Disposition Code 03 - skilled nursing facility (SNF)        Destination     No service coordination in this encounter.      Durable Medical Equipment     No service coordination in this encounter.      Dialysis/Infusion     No service coordination in this encounter.      Home Medical Care     No service coordination in this encounter.      Social Care     No service coordination in this encounter.        Expected Discharge Date and Time     Expected Discharge Date Expected Discharge Time    Aug 22, 2018               Demographic Summary     Row Name 08/20/18 0864       General Information    Referral Source admission list;nursing    Reason for Consult other (see comments)   Dispo     Preferred Language English     Used During This Interaction no    General Information Comments PCP is Jack Chilel       Contact Information    Permission Granted to Share Info With ;family/designee            Functional Status     Row Name 08/20/18 1307       Functional Status    Usual Activity Tolerance fair    Current Activity Tolerance poor       Functional Status, IADL    Medications assistive person    Meal Preparation completely dependent    Housekeeping completely dependent    Laundry completely dependent    Shopping completely dependent        Employment/    Employment/ Comments Has Medicare A&B. Denies issues getting medications.             Psychosocial    No documentation.           Abuse/Neglect    No documentation.           Legal    No documentation.           Substance Abuse    No documentation.           Patient Forms    No documentation.         Patricia Brody

## 2018-08-20 NOTE — THERAPY TREATMENT NOTE
Acute Care - Physical Therapy Treatment Note  [facility]     Patient Name: Eliane Zamora  : 1929  MRN: [id number]  Today's Date: 2018  Onset of Illness/Injury or Date of Surgery: 18  Date of Referral to PT: 18  Referring Physician: MD Shi    Admit Date: 2018    Visit Dx:    ICD-10-CM ICD-9-CM   1. Weakness R53.1 780.79   2. Dehydration E86.0 276.51   3. Dementia without behavioral disturbance, unspecified dementia type F03.90 294.20   4. Impaired mobility and ADLs Z74.09 799.89   5. Impaired functional mobility, balance, gait, and endurance Z74.09 V49.89     Patient Active Problem List   Diagnosis   • PAF (paroxysmal atrial fibrillation) (CMS/HCC)   • Hypothyroidism   • Abnormal stress echo   • Osteoporosis   • Hypercholesterolemia   • GERD (gastroesophageal reflux disease)   • Right lower lobe pneumonia (CMS/HCC)   • Dementia   • Leukocytosis   • Hypoglycemia   • Failure to thrive in adult   • Hyponatremia       Therapy Treatment          Rehabilitation Treatment Summary     Row Name 18 1339             Treatment Time/Intention    Discipline physical therapy assistant  -AS      Document Type therapy note (daily note)  -AS      Subjective Information complains of;weakness;fatigue  -AS      Mode of Treatment physical therapy  -AS      Patient/Family Observations patient supine in bed, IV. Patient sleepy but easily aroused.  -AS      Patient Effort adequate  -AS      Existing Precautions/Restrictions fall;other (see comments)   monitor BP, complaints of weakness, confusion  -AS      Recorded by [AS] Alyssa Kearney, BHARAT 18 1402      Row Name 18 133             Vital Signs    Intra Systolic BP Rehab 133  -AS      Intra Treatment Diastolic BP 64  -AS      Intra Patient Position Sitting  -AS      Recorded by [AS] Alyssa Kearney PTA 18 1402      Row Name 18 133             Cognitive Assessment/Intervention- PT/OT    Affect/Mental Status  (Cognitive) flat/blunted affect  -AS      Orientation Status (Cognition) oriented to;person;disoriented to;place;situation;verbal cues/prompts needed for orientation   knew [facility] but not that she was in hospital  -AS      Safety Deficit (Cognitive) moderate deficit  -AS      Personal Safety Interventions fall prevention program maintained;gait belt;nonskid shoes/slippers when out of bed;other (see comments)   bed alarm  -AS      Recorded by [AS] Alyssa Kearney, PTA 08/20/18 1402      Row Name 08/20/18 1339             Bed Mobility Assessment/Treatment    Scooting/Bridging [place] (Bed Mobility) verbal cues;dependent (less than 25% patient effort);2 person assist  -AS      Supine-Sit [place] (Bed Mobility) verbal cues;moderate assist (50% patient effort)  -AS      Sit-Supine [place] (Bed Mobility) verbal cues;moderate assist (50% patient effort);2 person assist  -AS      Bed Mobility, Safety Issues decreased use of arms for pushing/pulling;decreased use of legs for bridging/pushing;cognitive deficits limit understanding  -AS      Assistive Device (Bed Mobility) head of bed elevated;draw sheet;bed rails  -AS      Comment (Bed Mobility) patient need verbal cues for technique, assist at trunk to reach EOB.  -AS      Recorded by [AS] Alyssa Kearney, PTA 08/20/18 1402      Row Name 08/20/18 1339             Sit-Stand Transfer    Sit-Stand [place] (Transfers) verbal cues;moderate assist (50% patient effort);2 person assist  -AS      Assistive Device (Sit-Stand Transfers) other (see comments)   B UE support  -AS      Recorded by [AS] Alyssa Kearney PTA 08/20/18 1402      Row Name 08/20/18 1339             Stand-Sit Transfer    Stand-Sit [place] (Transfers) verbal cues;moderate assist (50% patient effort);2 person assist  -AS      Assistive Device (Stand-Sit Transfers) other (see comments)   B UE support  -AS      Recorded by [AS] Alyssa Kearney, PTA 08/20/18 1402      Row  Name 08/20/18 1339             Motor Skills Assessment/Interventions    Additional Documentation Therapeutic Exercise (Group)  -AS      Recorded by [AS] Alyssa Kearney, [facility] 08/20/18 1402      Row Name 08/20/18 1339             Therapeutic Exercise    43675 - PT Therapeutic Exercise Minutes 18  -AS      Recorded by [AS] Alyssa Kearney, [facility] 08/20/18 1402      Row Name 08/20/18 1339             Therapeutic Exercise    Lower Extremity Range of Motion (Therapeutic Exercise) hip flexion/extension, bilateral;knee flexion/extension, bilateral;ankle dorsiflexion/plantar flexion, bilateral  -AS      Exercise Type (Therapeutic Exercise) AROM (active range of motion)  -AS      Position (Therapeutic Exercise) seated  -AS      Sets/Reps (Therapeutic Exercise) 1/15   with rest breaks  -AS      Recorded by [AS] Alyssa Kearney, [facility] 08/20/18 1402      Row Name 08/20/18 1339             Static Sitting Balance    Level of [place] (Unsupported Sitting, Static Balance) moderate assist, 50 to 74% patient effort;other (see comments)   progressed to min assist with verbal and tactile cueing  -AS      Sitting Position (Unsupported Sitting, Static Balance) sitting on edge of bed  -AS      Time Able to Maintain Position (Unsupported Sitting, Static Balance) more than 5 minutes  -AS      Comment (Unsupported Sitting, Static Balance) performed B LE exercises seated EOB.  -AS      Recorded by [AS] Alyssa Kearney, [facility] 08/20/18 1402      Row Name 08/20/18 1339             Positioning and Restraints    Pre-Treatment Position in bed  -AS      Post Treatment Position bed  -AS      In Bed supine;call light within reach;encouraged to call for assist;exit alarm on;with OT  -AS      Recorded by [AS] Alyssa Kearney, [facility] 08/20/18 1402      Row Name 08/20/18 1339             Pain Scale: Numbers Pre/Post-Treatment    Pain Scale: Numbers, Pretreatment 0/10 - no pain  -AS      Pain Scale: Numbers, Post-Treatment 0/10 - no pain  -AS       Recorded by [AS] Alyssa Kearney, PTA 08/20/18 1402      Row Name                Wound 08/19/18 1900 coccyx pressure injury    Wound - Properties Group Date first assessed: 08/19/18 [JK] Time first assessed: 1900 [JK] Present On Admission : yes [JK] Location: coccyx [JK] Type: pressure injury [JK] Stage, Pressure Injury: Stage 1 [JK] Recorded by:  [RD] Marly Perla RN 08/20/18 0155    Row Name                Wound 08/19/18 2000 Left elbow skin tear    Wound - Properties Group Date first assessed: 08/19/18 [JK] Time first assessed: 2000 [JK] Side: Left [JK] Location: elbow [JK] Type: skin tear [JK] Recorded by:  [RD] Marly Perla RN 08/20/18 0157    Row Name                Wound 08/19/18 2000 Right calf skin tear    Wound - Properties Group Date first assessed: 08/19/18 [JK] Time first assessed: 2000 [JK] Side: Right [JK] Location: calf [JK] Type: skin tear [JK] Recorded by:  [Marly Smart RN 08/20/18 0158      User Key  (r) = Recorded By, (t) = Taken By, (c) = Cosigned By    Initials Name Effective Dates Discipline    AS Alyssa Kearney, PTA 06/22/15 -  PT    Marly Woodson RN 06/16/16 -  Nurse          Wound 08/19/18 1900 coccyx pressure injury (Active)   Dressing Appearance open to air 8/20/2018  6:00 AM   Base red/granulating 8/19/2018  8:00 PM   Periwound pink;blanchable 8/19/2018  8:00 PM   Care, Wound cleansed with;other (see comments) 8/19/2018  8:00 PM   Dressing Care, Wound skin barrier agent applied 8/19/2018  8:00 PM       Wound 08/19/18 2000 Left elbow skin tear (Active)   Dressing Appearance dry;intact 8/20/2018  8:00 AM   Closure Adhesive bandage 8/20/2018  8:00 AM   Base red/granulating 8/19/2018  8:00 PM   Periwound pink;blanchable 8/19/2018  8:00 PM   Care, Wound cleansed with;sterile normal saline 8/19/2018  8:00 PM   Dressing Care, Wound dressing applied;foam;low-adherent 8/19/2018  8:00 PM       Wound 08/19/18 2000 Right calf skin tear  (Active)   Dressing Appearance dry;intact 8/20/2018  8:00 AM   Closure Adhesive bandage 8/20/2018  8:00 AM   Base red/granulating 8/19/2018  8:00 PM   Periwound pink;blanchable 8/19/2018  8:00 PM   Care, Wound cleansed with;sterile normal saline 8/19/2018  8:00 PM   Dressing Care, Wound dressing applied;foam;low-adherent 8/19/2018  8:00 PM             Physical Therapy Education     Title: PT OT SLP Therapies (Active)     Topic: Physical Therapy (Active)     Point: Mobility training (Active)    Learning Progress Summary     Learner Status Readiness Method Response Comment Documented by    Patient Active Acceptance E NR  AS 08/20/18 1402     Active Eager E,D NR  DM 08/19/18 1314          Point: Home exercise program (Active)    Learning Progress Summary     Learner Status Readiness Method Response Comment Documented by    Patient Active Acceptance E NR  AS 08/20/18 1402     Active Eager E,D NR  DM 08/19/18 1314          Point: Body mechanics (Active)    Learning Progress Summary     Learner Status Readiness Method Response Comment Documented by    Patient Active Acceptance E NR  AS 08/20/18 1402     Active Eager E,D NR  DM 08/19/18 1314          Point: Precautions (Active)    Learning Progress Summary     Learner Status Readiness Method Response Comment Documented by    Patient Active Acceptance E NR  AS 08/20/18 1402     Active Eager E,D NR  DM 08/19/18 1314                      User Key     Initials Effective Dates Name Provider Type Discipline    DM 06/19/15 -  Lashanda Gerard, PT Physical Therapist PT    AS 06/22/15 -  Alyssa Kearney, PTA Physical Therapy Assistant PT                    PT Recommendation and Plan     Plan of Care Reviewed With: patient  Progress: improving  Outcome Summary: patient able to maintain static sitting balance at EOB with mod>min assist while performing B LE exercises, patient is fearfull of falling and needs reassurance with sit<>stand transfers, patient declined OOB to  chair.          Outcome Measures     Row Name 08/20/18 1339 08/19/18 1144 08/19/18 1000       How much help from another person do you currently need...    Turning from your back to your side while in flat bed without using bedrails? 2  -AS 2  -DM  --    Moving from lying on back to sitting on the side of a flat bed without bedrails? 2  -AS 2  -DM  --    Moving to and from a bed to a chair (including a wheelchair)? 2  -AS 1  -DM  --    Standing up from a chair using your arms (e.g., wheelchair, bedside chair)? 2  -AS 2  -DM  --    Climbing 3-5 steps with a railing? 1  -AS 1  -DM  --    To walk in hospital room? 1  -AS 1  -DM  --    AM-PAC 6 Clicks Score 10  -AS 9  -DM  --       How much help from another is currently needed...    Putting on and taking off regular lower body clothing?  --  -- 1  -BRANDON    Bathing (including washing, rinsing, and drying)  --  -- 2  -BRANDON    Toileting (which includes using toilet bed pan or urinal)  --  -- 1  -BRANDON    Putting on and taking off regular upper body clothing  --  -- 2  -BRANDON    Taking care of personal grooming (such as brushing teeth)  --  -- 2  -BRANDON    Eating meals  --  -- 2  -BRANDON    Score  --  -- 10  -BRANDON       Functional Assessment    Outcome Measure Options AM-PAC 6 Clicks Basic Mobility (PT)  -AS AM-PAC 6 Clicks Basic Mobility (PT)  -DM AM-PAC 6 Clicks Daily Activity (OT)  -BRANDON      User Key  (r) = Recorded By, (t) = Taken By, (c) = Cosigned By    Initials Name Provider Type    BRANDON Candy Taveras, OT Occupational Therapist    DM Lashanda Gerard, PT Physical Therapist    AS Alyssa Kearney, PTA Physical Therapy Assistant           Time Calculation:         PT Charges     Row Name 08/20/18 1339             Time Calculation    Start Time 1339  -AS      PT Received On 08/20/18  -AS      PT Goal Re-Cert Due Date 08/29/18  -AS         Timed Charges    88323 - PT Therapeutic Exercise Minutes 18  -AS        User Key  (r) = Recorded By, (t) = Taken By, (c) = Cosigned By    Initials  Name Provider Type    AS Alyssa Kearney PTA Physical Therapy Assistant        Therapy Suggested Charges     Code   Minutes Charges    73211 (CPT®) Hc Pt Neuromusc Re Education Ea 15 Min      95769 (CPT®) Hc Pt Ther Proc Ea 15 Min 18 1    57855 (CPT®) Hc Gait Training Ea 15 Min      86904 (CPT®) Hc Pt Therapeutic Act Ea 15 Min      81642 (CPT®) Hc Pt Manual Therapy Ea 15 Min      53551 (CPT®) Hc Pt Iontophoresis Ea 15 Min      64879 (CPT®) Hc Pt Elec Stim Ea-Per 15 Min      58191 (CPT®) Hc Pt Ultrasound Ea 15 Min      76189 (CPT®) Hc Pt Self Care/Mgmt/Train Ea 15 Min      47590 (CPT®) Hc Pt Prosthetic (S) Train Initial Encounter, Each 15 Min      07636 (CPT®) Hc Pt Orthotic(S)/Prosthetic(S) Encounter, Each 15 Min      61639 (CPT®) Hc Orthotic(S) Mgmt/Train Initial Encounter, Each 15min      Total  18 1        Therapy Charges for Today     Code Description Service Date Service Provider Modifiers Qty    28771962441 HC PT THER PROC EA 15 MIN 8/20/2018 Alyssa Kearney, PTA GP 1          PT G-Codes  PT Professional Judgement Used?: Yes  Outcome Measure Options: AM-PAC 6 Clicks Basic Mobility (PT)  Score: 9  Functional Limitation: Mobility: Walking and moving around  Mobility: Walking and Moving Around Current Status (): At least 60 percent but less than 80 percent impaired, limited or restricted  Mobility: Walking and Moving Around Goal Status (): At least 40 percent but less than 60 percent impaired, limited or restricted    Alyssa Kearney PTA  8/20/2018

## 2018-08-21 PROCEDURE — 25010000002 HEPARIN (PORCINE) PER 1000 UNITS: Performed by: INTERNAL MEDICINE

## 2018-08-21 PROCEDURE — 99232 SBSQ HOSP IP/OBS MODERATE 35: CPT | Performed by: NURSE PRACTITIONER

## 2018-08-21 RX ORDER — DOCUSATE SODIUM 100 MG/1
100 CAPSULE, LIQUID FILLED ORAL 2 TIMES DAILY PRN
Status: DISCONTINUED | OUTPATIENT
Start: 2018-08-21 | End: 2018-08-23 | Stop reason: HOSPADM

## 2018-08-21 RX ADMIN — PANTOPRAZOLE SODIUM 40 MG: 40 TABLET, DELAYED RELEASE ORAL at 08:51

## 2018-08-21 RX ADMIN — LEVOTHYROXINE SODIUM 75 MCG: 75 TABLET ORAL at 08:51

## 2018-08-21 RX ADMIN — QUETIAPINE FUMARATE 6.25 MG: 25 TABLET ORAL at 21:45

## 2018-08-21 RX ADMIN — SODIUM CHLORIDE 50 ML/HR: 9 INJECTION, SOLUTION INTRAVENOUS at 20:34

## 2018-08-21 RX ADMIN — HEPARIN SODIUM 5000 UNITS: 5000 INJECTION, SOLUTION INTRAVENOUS; SUBCUTANEOUS at 21:45

## 2018-08-21 RX ADMIN — DILTIAZEM HYDROCHLORIDE 120 MG: 120 CAPSULE, EXTENDED RELEASE ORAL at 08:51

## 2018-08-21 RX ADMIN — HEPARIN SODIUM 5000 UNITS: 5000 INJECTION, SOLUTION INTRAVENOUS; SUBCUTANEOUS at 06:41

## 2018-08-21 RX ADMIN — HEPARIN SODIUM 5000 UNITS: 5000 INJECTION, SOLUTION INTRAVENOUS; SUBCUTANEOUS at 16:45

## 2018-08-21 NOTE — PROGRESS NOTES
[facility]  PROGRESS NOTE    Patient Name: Eliane Zamora  : 1929  MRN: [id number]    Date of Admission: 2018  Length of Stay: 1  Primary Care Physician: Jack Chilel MD    Subjective     CC: failure to thrive    HPI:  No new issues per patient. States she is feeling well, no pain, soa. Knows she is in a [facility], otherwise confused    Review of Systems  Gen- No fevers, chills  CV- No chest pain, palpitations  Resp- No cough, dyspnea  GI- No N/V/D, abd pain    Otherwise ROS is negative other than as per HPI    Objective     Vital Signs:   Temp:  [98 °F (36.7 °C)-100.2 °F (37.9 °C)] 98.8 °F (37.1 °C)  Heart Rate:  [80-92] 91  Resp:  [18-20] 18  BP: (112-143)/(58-85) 112/85      Physical Exam:  Constitutional: No acute distress, awake, alert  HENT: NCAT, mucous membranes moist  Respiratory: Clear to auscultation bilaterally, respiratory effort normal.  Cardiovascular: RRR, no murmurs, rubs, or gallops, palpable pedal pulses bilaterally  Gastrointestinal: Positive bowel sounds, soft, nontender, nondistended  Musculoskeletal: No bilateral ankle edema  Psychiatric: Appropriate affect. She is pleasant, calm and cooperative with exam.   Neurologic: Oriented to self/ place, no focal deficits  Skin: No rashes    Results Reviewed:  I have personally reviewed current lab, radiology, and data and agree.      Results from last 7 days  Lab Units 18  1224 18  0555 18  0945   WBC 10*3/mm3 10.91* 13.03* 15.31*   HEMOGLOBIN g/dL 10.3* 10.1* 12.0   HEMATOCRIT % 31.2* 29.9* 35.9   PLATELETS 10*3/mm3 308 311 345       Results from last 7 days  Lab Units 18  0451 18  0555 18  0945   SODIUM mmol/L 126* 124* 131*   POTASSIUM mmol/L 4.5 4.5 4.7   CHLORIDE mmol/L 102 97* 98*   CO2 mmol/L 19.0* 24.0 24.0   BUN mg/dL 12 14 18   CREATININE mg/dL 0.74 0.93 1.07   GLUCOSE mg/dL 100 115* 85   CALCIUM mg/dL 6.8* 7.4* 8.3*   ALT (SGPT) U/L   --   --  28   AST (SGOT) U/L  --   --  25     Estimated Creatinine Clearance: 36.4 mL/min (by C-G formula based on SCr of 0.74 mg/dL).  No results found for: BNP    Microbiology Results Abnormal     None        Imaging Results (last 24 hours)     Procedure Component Value Units Date/Time    XR Chest 1 View [[id number]] Collected:  08/20/18 1205     Updated:  08/20/18 1255    Narrative:       EXAMINATION: XR CHEST 1 VW-      INDICATION: Intermittent hypoxia, recent pneumonia in June;  R53.1-Weakness; E86.0-Dehydration; F03.90-Unspecified dementia without  behavioral disturbance; Z74.09-Other reduced mobility.      COMPARISON: 08/18/2018.     FINDINGS: Mildly decreased lung volumes from recent prior comparison  with bibasilar atelectasis and/or scarring from hypoventilatory  findings. No focal consolidation. Chronic lung changes again noted in  the background. Cardiac size within normal limits. No pneumothorax or  significant pleural effusion. Degenerative change of the spine.       Impression:       Mildly decreased lung volumes from recent prior comparison  08/18/2018 with hypoventilatory findings, however, no focal  consolidation or significant effusion.     D:  08/20/2018  E:  08/20/2018     This report was finalized on 8/20/2018 12:53 PM by Dr. Benigno Shen.           Results for orders placed during the hospital encounter of 06/07/18   Adult Transthoracic Echo Complete W/ Cont if Necessary Per Protocol    Narrative · Left ventricular systolic function is normal.  · Estimated EF appears to be in the range of 66 - 70%.  · Left atrial cavity size is mildly dilated.        I have reviewed the medications.      diltiaZEM  mg Oral Q24H   heparin (porcine) 5,000 Units Subcutaneous Q8H   levothyroxine 75 mcg Oral Daily   pantoprazole 40 mg Oral Daily   QUEtiapine 6.25 mg Oral Nightly     Assessment / Plan     Hospital Problem List     * (Principal)Failure to thrive in adult    PAF (paroxysmal atrial fibrillation)  (CMS/[facility])    Overview Addendum 11/16/2016 10:33 AM by Josefina Luis     1. Paroxysmal atrial fibrillation:  a. Rhythm control with sotalol.  b. Excessive bradyarrhythmias associated with digoxin.     Her Holter monitor showed essentially sinus rhythm with no excessive bradycardia and no excessive tachycardia.  Her EKG today shows sinus rhythm, corrected QT interval of 418 milliseconds. 05/07/15         Hypothyroidism    Overview Signed 11/16/2016 10:32 AM by Josefina Luis     On chronic supplementation         Dementia    Leukocytosis    Hypoglycemia    Hyponatremia         Brief Hospital Course to date:  89 yof with hx of dementia presents with worsening confusion and falls, admitted with adult failure to thrive. Admitted to [facility] in June 2018 for RLL pneumonia, discharged to rehab. Since returning home, multiple falls, poor PO intake and progressive decline.  having difficulty caring for her at home.      Assessment & Plan:    Acute encephalopathy, superimposed on chronic dementia  Leukocytosis  - Seems improved today. She is confused at baseline   - No infectious etiology on initial work up. CXR/UA negative. Will repeat CXR, CBC and procalcitonin today   - Leukocytosis improving   - Mildly hypoxic on O2 monitor. Admitted here in June 2018 with RLL pneumonia     Frequent falls  Adult failure to thrive  Malnutrition  - PO intake improving, but states she forgets to eat and drink  - Nutrition following, supplements with meals.    Hypoglycemia, resolved    Hyponatremia  - suspect secondary to hypovolemia. Decrease IVFs to 50mL/hr today given her improved PO intake. Repeat AM BMP.    Hypothyroidism  - TSH up to 18 but normal T4. Suspect non-compliance. Continue home dose.     Paroxsymal atrial fibrillation  - rate controlled. Continue Diltiazem. No AC due to falls and age.     DVT prophylaxis: Lovenox   CODE STATUS:   Code Status and Medical Interventions:   Ordered at: 08/18/18 4177     Level Of Support  Discussed With:    Next of Kin (If No Surrogate)     Code Status:    No CPR     Medical Interventions (Level of Support Prior to Arrest):    Full     Disposition: Case management consulted. Unlikely that spouse will be able to care for patient moving forward. She will likely need long-term placement.     Electronically signed by DEEJAY Hines, 08/21/18, 11:48 AM.    I have reviewed the documentation above and agree.

## 2018-08-21 NOTE — PLAN OF CARE
Problem: Patient Care Overview  Goal: Plan of Care Review  Outcome: Ongoing (interventions implemented as appropriate)   08/21/18 4217   Coping/Psychosocial   Plan of Care Reviewed With patient   Plan of Care Review   Progress no change   OTHER   Outcome Summary Pt. sleeping for most of the evening; moved into a speciality bed; bowel movement; maintance fluids continued; VSS; NSR on the monitor; room air; no other problems identified; continue to monitor.

## 2018-08-21 NOTE — PROGRESS NOTES
"Clinical Nutrition   Reason For Visit: Follow-up protocol    Patient Name: Eliane Zamora  YOB: 1929  MRN: [id number]  Date of Encounter: 18 3:09 PM  Admission date: 2018        Nutrition Assessment     Hospital Problem List  Principal Problem:    Failure to thrive in adult  Active Problems:    PAF (paroxysmal atrial fibrillation) (CMS/HCC)    Hypothyroidism    Dementia    Leukocytosis    Hypoglycemia    Hyponatremia          PMH: She  has a past medical history of Abnormal stress echo; Dementia; GERD (gastroesophageal reflux disease); Hypercholesterolemia; Hypothyroidism; Osteoporosis; PAF (paroxysmal atrial fibrillation) (CMS/HCC); and UTI (urinary tract infection).   PSxH: She  has a past surgical history that includes  section and Breast surgery.           Reported/Observed/Food/Nutrition Related History     Pt pleasant, unable to answer most questions.  States she likes strawberry and vanilla better than chocolate.  RD will note preference for .       Anthropometrics   Height: 62 \"  Weight: 106 lbs 7.7 oz-- bed weight on admission  BMI: 19.48  BMI classification: Normal: 18.5-24.9kg/m2   UBW: Per EMR pt's weight consistently between 100-106 lbs; pt unable to provide information on weight hx.  IBW: 110 lbs       Labs reviewed   Labs reviewed: Yes    Medications reviewed   Medications reviewed: Yes    Intake/Ouptut 24 hrs (7:00AM - 6:59 AM)     Intake & Output (last day)        07 -  0700  07 -  0700    P.O. 720 480    I.V. (mL/kg) 675 (14)     Total Intake(mL/kg) 1395 (28.9) 480 (9.9)    Net +1395 +480          Unmeasured Urine Occurrence 2 x 2 x    Unmeasured Stool Occurrence 1 x             Current Nutrition Prescription   PO: Diet Regular  Oral Nutrition Supplement: Boost 2x/day    Evaluation of Received Nutrient/Fluid Intake:  2 Days: 40% of 5 meals per nursing documentation       Nutrition Diagnosis     Problem Inadequate oral intake "   Etiology FTT, dementia   Signs/Symptoms PO intake 40% of 5 meals         Intervention   Intervention: Follow treatment progress, Care plan reviewed, Adjusted supplement, Encourage intake  * Boost Plus 3x/day--strawberry or vanilla    Goal:   General: Nutrition support treatment  PO: Tolerate PO, Increase intake        Monitoring/Evaluation:       Monitoring/Evaluation: Per protocol, I&O, PO intake, Supplement intake, Pertinent labs, Weight, Skin status, POC/GOC  Will Continue to follow per protocol  Celsa Bailon, RD  Time Spent: 25 min

## 2018-08-22 PROCEDURE — 97530 THERAPEUTIC ACTIVITIES: CPT

## 2018-08-22 PROCEDURE — 97535 SELF CARE MNGMENT TRAINING: CPT

## 2018-08-22 PROCEDURE — 99232 SBSQ HOSP IP/OBS MODERATE 35: CPT | Performed by: INTERNAL MEDICINE

## 2018-08-22 PROCEDURE — 25010000002 HEPARIN (PORCINE) PER 1000 UNITS: Performed by: INTERNAL MEDICINE

## 2018-08-22 RX ADMIN — HEPARIN SODIUM 5000 UNITS: 5000 INJECTION, SOLUTION INTRAVENOUS; SUBCUTANEOUS at 16:05

## 2018-08-22 RX ADMIN — HEPARIN SODIUM 5000 UNITS: 5000 INJECTION, SOLUTION INTRAVENOUS; SUBCUTANEOUS at 21:40

## 2018-08-22 RX ADMIN — QUETIAPINE FUMARATE 6.25 MG: 25 TABLET ORAL at 21:40

## 2018-08-22 RX ADMIN — LEVOTHYROXINE SODIUM 75 MCG: 75 TABLET ORAL at 09:17

## 2018-08-22 RX ADMIN — DILTIAZEM HYDROCHLORIDE 120 MG: 120 CAPSULE, EXTENDED RELEASE ORAL at 09:17

## 2018-08-22 RX ADMIN — PANTOPRAZOLE SODIUM 40 MG: 40 TABLET, DELAYED RELEASE ORAL at 09:16

## 2018-08-22 NOTE — THERAPY TREATMENT NOTE
"Acute Care - Physical Therapy Treatment Note  [facility]     Patient Name: Eliane Zamora  : 1929  MRN: [id number]  Today's Date: 2018  Onset of Illness/Injury or Date of Surgery: 18  Date of Referral to PT: 18  Referring Physician: MD Shi    Admit Date: 2018    Visit Dx:    ICD-10-CM ICD-9-CM   1. Weakness R53.1 780.79   2. Dehydration E86.0 276.51   3. Dementia without behavioral disturbance, unspecified dementia type F03.90 294.20   4. Impaired mobility and ADLs Z74.09 799.89   5. Impaired functional mobility, balance, gait, and endurance Z74.09 V49.89     Patient Active Problem List   Diagnosis   • PAF (paroxysmal atrial fibrillation) (CMS/HCC)   • Hypothyroidism   • Abnormal stress echo   • Osteoporosis   • Hypercholesterolemia   • GERD (gastroesophageal reflux disease)   • Right lower lobe pneumonia (CMS/HCC)   • Dementia   • Leukocytosis   • Hypoglycemia   • Failure to thrive in adult   • Hyponatremia       Therapy Treatment          Rehabilitation Treatment Summary     Row Name 18 1454 18 0841          Treatment Time/Intention    Discipline physical therapy assistant  -DM occupational therapist  -BRANDON     Document Type therapy note (daily note)  -DM therapy note (daily note)  -     Subjective Information complains of;fatigue   UICsinceAM;SpilledOJ(Gown,BktSoaked);incontUrine;\"lightHurts  -DM no complaints  -BRANDON     Mode of Treatment individual therapy;physical therapy  -DM individual therapy;occupational therapy  -BRANDON     Patient/Family Observations UIC on tele; RA;SHIVERING;had pulled IV out(PCT noted when soiled blkt. removed);IV pump paused;exit alarm;waff cush;had slid hips down in chair;PCT assisted PT in scooting to upright posn;soiled linens removed;gown changed;pt req.warm blkt   -DM Pt. supine in bed asleep.  When moved noted urine and stool incontinence.  IV/telemetry.  -BRANDON     Care Plan Review care plan/treatment goals reviewed;patient/other agree to " "care plan  -DM care plan/treatment goals reviewed;risks/benefits reviewed;current/potential barriers reviewed  -BRANDON     Therapy Frequency (PT Clinical Impression) daily  -DM  --     Patient Effort fair  -DM adequate  -BRANDON     Existing Precautions/Restrictions fall;other (see comments)   monitor BP, complaints of weakness, confusion, \"freezing\"  -DM fall  -BRANDON     Recorded by [DM] Lashanda Gerard, PT 08/22/18 7988 [BRANDON] Candy Taveras, OT 08/22/18 0955     Row Name 08/22/18 1454 08/22/18 0841          Vital Signs    Pre Systolic BP Rehab 132  -DM --   vitals stable throughout tx.  Nurse OK tx.  -BRANDON     Pre Treatment Diastolic BP 71  -DM  --     Intra Systolic BP Rehab 145  -DM  --     Intra Treatment Diastolic BP 65  -DM  --     Post Systolic BP Rehab 141  -DM  --     Post Treatment Diastolic BP 75  -DM  --     Pretreatment Heart Rate (beats/min) 69  -DM  --     Intratreatment Heart Rate (beats/min) 90  -DM  --     Posttreatment Heart Rate (beats/min) 83  -DM  --     Pre SpO2 (%) 96  -DM 91  -BRANDON     O2 Delivery Pre Treatment room air  -DM room air  -BRANDON     Intra SpO2 (%) 94  -DM  --     O2 Delivery Intra Treatment room air  -DM  --     Post SpO2 (%) 95  -DM  --     O2 Delivery Post Treatment room air  -DM  --     Pre Patient Position Sitting  -DM Supine  -BRANDON     Intra Patient Position Standing  -DM Sitting  -BRANDON     Post Patient Position Sitting  -DM Sitting  -BRANDON     Recorded by [DM] Lashanda Gerard, PT 08/22/18 7614 [BRANDON] Candy Taveras, OT 08/22/18 0955     Row Name 08/22/18 1454             Cognitive Assessment/Intervention    Additional Documentation Cognitive Assessment/Intervention (Group)  -DM      Recorded by [DM] Lashanda Gerard, PT 08/22/18 1554      Row Name 08/22/18 1454 08/22/18 0858          Cognitive Assessment/Intervention- PT/OT    Affect/Mental Status (Cognitive) low arousal/lethargic  -DM flat/blunted affect  -BRANDON     Orientation Status (Cognition) oriented to;person;disoriented " "to;place;situation;verbal cues/prompts needed for orientation   stated \"hospital\" only  -DM oriented to;person;disoriented to;place;situation;time  -BRANDON     Follows Commands (Cognition)  -- follows one step commands;75-90% accuracy;verbal cues/prompting required;repetition of directions required  -BRANDON     Safety Deficit (Cognitive) moderate deficit;impulsivity;insight into deficits/self awareness  -DM moderate deficit  -BRANDON     Personal Safety Interventions fall prevention program maintained;gait belt;nonskid shoes/slippers when out of bed  -DM fall prevention program maintained;gait belt;nonskid shoes/slippers when out of bed  -BRANDON     Recorded by [DM] Lashanda Gerard, PT 08/22/18 1554 [BRANDON] Candy Taveras, OT 08/22/18 0955     Row Name 08/22/18 1454 08/22/18 0841          Safety Issues, Functional Mobility    Safety Issues Affecting Function (Mobility)  -- ability to follow commands;problem solving;sequencing abilities  -BRANDON     Impairments Affecting Function (Mobility) balance;cognition;endurance/activity tolerance;pain;postural/trunk control;shortness of breath;strength  -DM balance;cognition;endurance/activity tolerance;strength  -BRANDON     Recorded by [DM] Lashanda Gerard, PT 08/22/18 1554 [BRANDON] Candy Taveras, OT 08/22/18 0955     Row Name 08/22/18 1454 08/22/18 0841          Bed Mobility Assessment/Treatment    Bed Mobility Assessment/Treatment  -- rolling left;rolling right;scooting/bridging;supine-sit  -BRANDON     Rolling Left [place] (Bed Mobility)  -- minimum assist (75% patient effort);verbal cues;nonverbal cues (demo/gesture)  -BRANDON     Rolling Right [place] (Bed Mobility)  -- verbal cues;nonverbal cues (demo/gesture);minimum assist (75% patient effort)  -BRANDON     Scooting/Bridging [place] (Bed Mobility) --  -DM dependent (less than 25% patient effort);verbal cues;nonverbal cues (demo/gesture)   pt. did bridge x 3 with removing wet pads  -BRANDON     Supine-Sit [place] (Bed Mobility) --  -DM verbal " cues;moderate assist (50% patient effort)  -BRANDON     Sit-Supine [place] (Bed Mobility) --  -DM  --     Bed Mobility, Safety Issues --  -DM decreased use of arms for pushing/pulling;decreased use of legs for bridging/pushing;impaired trunk control for bed mobility;cognitive deficits limit understanding  -BRANDON     Assistive Device (Bed Mobility) --  -DM draw sheet;bed rails  -BRANDON     Comment (Bed Mobility) UI  -DM Pt. needing step by step cueing.  -BRANDON     Recorded by [DM] Lashanda Gerard, PT 08/22/18 1554 [BRANDON] Candy Taveras, OT 08/22/18 0955     Row Name 08/22/18 1454 08/22/18 0841          Transfer Assessment/Treatment    Transfer Assessment/Treatment sit-stand transfer;stand-sit transfer  -DM sit-stand transfer;stand-sit transfer;bed-chair transfer  -BRANDON     Recorded by [DM] Lashanda Gerard, PT 08/22/18 1554 [BRANDON] Candy Taveras, OT 08/22/18 1000     Row Name 08/22/18 0841             Bed-Chair Transfer    Bed-Chair [place] (Transfers) maximum assist (25% patient effort);2 person assist  -BRANDON      Assistive Device (Bed-Chair Transfers) --   UE support and gait belt  -BRANDON      Recorded by [BRANDON] Candy Taveras, OT 08/22/18 1000      Row Name 08/22/18 1454 08/22/18 0841          Sit-Stand Transfer    Sit-Stand [place] (Transfers) verbal cues;maximum assist (25% patient effort);1 person to manage equipment   STS x 2;gown/linens changed;hygiene;kneesBuckled;satQuickly  -DM maximum assist (25% patient effort);2 person assist;verbal cues;nonverbal cues (demo/gesture)  -BRANDON     Assistive Device (Sit-Stand Transfers) walker, front-wheeled;other (see comments)   2ndTrial:PTgaveCounterpressure to kneesduringHygiene(buckled  -DM --   gait belt and UE support  -BRANDON     Recorded by [DM] Lashanda Gerard, PT 08/22/18 1554 [BRANDON] Candy Taveras, OT 08/22/18 0955     Row Name 08/22/18 1454 08/22/18 0841          Stand-Sit Transfer    Stand-Sit [place] (Transfers) verbal cues;moderate assist (50% patient effort);1  person to manage equipment  -DM maximum assist (25% patient effort);2 person assist;nonverbal cues (demo/gesture);verbal cues  -BRANDON     Assistive Device (Stand-Sit Transfers) walker, front-wheeled  -DM --   gait belt and UE support  -BRANDON     Recorded by [DM] Lashanda Gerard, PT 08/22/18 3326 [BRANDON] Candy Taveras, OT 08/22/18 0955     Row Name 08/22/18 1454             Gait/Stairs Assessment/Training    Gait/Stairs Assessment/Training gait/ambulation independence;gait/ambulation assistive device;distance ambulated;gait pattern;gait deviations  -DM      [place] Level (Gait) maximum assist (25% patient effort)  -DM      Assistive Device (Gait) walker, front-wheeled  -DM      Distance in Feet (Gait) 2   1 ft.sidestepL;sat;2nd:1 ft stepping back  -DM      Pattern (Gait) step-to  -DM      Deviations/Abnormal Patterns (Gait) base of support, narrow;sofia decreased;stride length decreased  -DM      Bilateral Gait Deviations forward flexed posture;heel strike decreased;knee buckling, bilateral  -DM      Comment (Gait/Stairs) poor compliance w/gt cues  -DM      Recorded by [DM] Lashanda Gerard, PT 08/22/18 0432      Row Name 08/22/18 0841             ADL Assessment/Intervention    09568 - OT Self Care/Mgmt Minutes 18  -BRANDON      BADL Assessment/Intervention grooming;feeding;toileting  -BRANDON      Recorded by [BRANDON] Candy Taveras, OT 08/22/18 0955      Row Name 08/22/18 0841             Grooming Assessment/Training    [place] Level (Grooming) hair care, combing/brushing;supervision;verbal cues;wash face, hands;minimum assist (75% patient effort)  -BRANDON      Grooming Position supine;supported sitting  -BRANDON      Comment (Grooming) Pt. able to comb hair with setup today and wash face, but some assist to fully clean hands thoroughly.  -BRANDON      Recorded by [BRANDON] Candy Taveras, OT 08/22/18 0955      Row Name 08/22/18 0841             Self-Feeding Assessment/Training    [place] Level (Feeding) finger foods;liquids to  mouth;set up   cues to intiate and need to eat  -BRANDON      Self-Feeding Assess/Train, Spillage Amount minimal  -BRANDON      Position (Self-Feeding) supported sitting  -BRANDON      Comment (Feeding) pt. able to  drinks and drink on own today and eat finger foods.  -BRANDON      Recorded by [BRANDON] Candy Taveras, OT 08/22/18 0955      Row Name 08/22/18 0841             Toileting Assessment/Training    Comment (Toileting) Pt. with wet soiled pads.  Pt. dependent to clean but bridged so pad could be removed and rolled min assist to be cleaned further.  -BRANDON      Recorded by [BRANDON] Candy Taveras, OT 08/22/18 0955      Row Name 08/22/18 0841             BADL Safety/Performance    Impairments, BADL Safety/Performance balance;cognition;endurance/activity tolerance;strength  -BRANDON      Cognitive Impairments, BADL Safety/Performance insight into deficits/self awareness;judgment;problem solving/reasoning;safety precaution awareness;safety precaution follow-through;sequencing abilities  -BRANDON      Progress in [facility] Status independence level  -BRANDON      Recorded by [BRANDON] Candy Taveras, OT 08/22/18 0955      Row Name 08/22/18 1454             Motor Skills Assessment/Interventions    Additional Documentation Therapeutic Exercise (Group);Therapeutic Exercise Interventions (Group)  -DM      Recorded by [DM] Lashanda Gerard, PT 08/22/18 1554      Row Name 08/22/18 0841             Therapeutic Exercise    11141 - OT Therapeutic Exercise Minutes 5  -BRANDON      99237 - OT Therapeutic Activity Minutes 13  -BRANDON      Recorded by [BRANDON] Candy Taveras, OT 08/22/18 0955      Row Name 08/22/18 1454 08/22/18 0841          Therapeutic Exercise    Upper Extremity Range of Motion (Therapeutic Exercise) shoulder flexion/extension, bilateral;shoulder abduction/adduction, bilateral;shoulder horizontal abduction/adduction, bilateral;elbow flexion/extension, bilateral  -DM shoulder flexion/extension, bilateral;shoulder abduction/adduction, bilateral;shoulder horizontal  abduction/adduction, bilateral;elbow flexion/extension, bilateral  -BRANDON     Lower Extremity (Therapeutic Exercise) gluteal sets;heel slides, bilateral;LAQ (long arc quad), bilateral;marching while seated;quad sets, bilateral;SAQ (short arc quad), bilateral;SLR (straight leg raise), bilateral  -DM  --     Lower Extremity Range of Motion (Therapeutic Exercise) hip abduction/adduction, bilateral;hip internal/external rotation, bilateral;knee flexion/extension, bilateral;ankle dorsiflexion/plantar flexion, bilateral   pillow squeezes  -DM  --     Exercise Type (Therapeutic Exercise) AAROM (active assistive range of motion);AROM (active range of motion);isometric contraction, static  -DM AROM (active range of motion);AAROM (active assistive range of motion)   pt. at time some [facility] guidance  -BRANDON     Position (Therapeutic Exercise) seated  -DM supine  -BRANDON     Sets/Reps (Therapeutic Exercise) 1/20   1/10 for sh.flex  -DM 1/10  -BRANDON     Comment (Therapeutic Exercise) --   mult rests d/t fatigue,shivering/huddling under blkt  -DM  --     Recorded by [DM] Lashanda Gerard, PT 08/22/18 1554 [BRANDON] Candy Taveras, OT 08/22/18 0955     Row Name 08/22/18 1454             Balance    Balance static sitting balance;static standing balance  -DM      Recorded by [DM] Lashanda Gerard, PT 08/22/18 1554      Row Name 08/22/18 1454 08/22/18 0841          Static Sitting Balance    Level of [place] (Unsupported Sitting, Static Balance) minimal assist, 75% patient effort   LOB post. x 2  -DM contact guard assist  -BRANDON     Sitting Position (Unsupported Sitting, Static Balance) sitting in chair  -DM sitting on edge of bed  -BRANDON     Time Able to Maintain Position (Unsupported Sitting, Static Balance) 4 to 5 minutes  -DM 1 to 2 minutes   pt. needing BUe support.  -BRANDON     Comment (Unsupported Sitting, Static Balance) sat forward for gown change/hygiene,PLB,then boosted back in chair w/draw sheet  -DM  --     Recorded by [DM] Lashanda Gerard, PT  08/22/18 1554 [BRANDON] Candy Taveras, OT 08/22/18 0955     Row Name 08/22/18 1454 08/22/18 0841          Dynamic Sitting Balance    Level of [place], Reaches Outside Midline (Sitting, Dynamic Balance) moderate assist, 50 to 74% patient effort  -DM minimal assist, 75% patient effort   with grooming task  -BRANDON     Sitting Position, Reaches Outside Midline (Sitting, Dynamic Balance) sitting in chair  -DM sitting on edge of bed  -BRANDON     Comment, Reaches Outside Midline (Sitting, Dynamic Balance) scooting F/B; Rocking forward STS  -DM  --     Recorded by [DM] Lashanda Gerard, PT 08/22/18 1554 [BRANDON] Candy Taveras, OT 08/22/18 0955     Row Name 08/22/18 1454             Static Standing Balance    Level of [place] (Supported Standing, Static Balance) maximal assist, 25 to 49% patient effort  -DM      Time Able to Maintain Position (Supported Standing, Static Balance) 2 to 3 minutes  -DM      Assistive Device Utilized (Supported Standing, Static Balance) rolling walker  -DM      Comment (Supported Standing, Static Balance) HYGIENE  -DM      Recorded by [DM] Lashanda Gerard, PT 08/22/18 1554      Row Name 08/22/18 1454             Dynamic Standing Balance    Level of [place], Reaches Outside Midline (Standing, Dynamic Balance) maximal assist, 25 to 49% patient effort  -DM      Time Able to Maintain Position, Reaches Outside Midline (Standing, Dynamic Balance) 1 to 2 minutes  -DM      Comment, Reaches Outside Midline (Standing, Dynamic Balance) R WX; WS TO INIT.SIDESTEP, then stepping back  -DM      Recorded by [DM] Lashanda Gerard, PT 08/22/18 1554      Row Name 08/22/18 1454 08/22/18 0841          Positioning and Restraints    Pre-Treatment Position sitting in chair/recliner  -DM in bed  -BRANDON     Post Treatment Position chair  -DM chair  -BRANDON     In Chair notified nsg;reclined;call light within reach;encouraged to call for assist;exit alarm on;with other staff;RUE elevated;LUE elevated;waffle cushion;legs  elevated  -DM reclined;call light within reach;encouraged to call for assist;exit alarm on;waffle cushion;RLE elevated  -BRANDON     Recorded by [DM] Lashanda Gerard, PT 08/22/18 1554 [BRANDON] Candy Taveras, OT 08/22/18 0955     Row Name 08/22/18 1454             Pain Assessment    Additional Documentation Pain Scale: Numbers Pre/Post-Treatment (Group)  -DM      Recorded by [DM] Lashanda Gerard, PT 08/22/18 1554      Row Name 08/22/18 1454 08/22/18 0841          Pain Scale: Numbers Pre/Post-Treatment    Pain Scale: Numbers, Pretreatment 0/10 - no pain  -DM 0/10 - no pain  -BRANDON     Pain Scale: Numbers, Post-Treatment 0/10 - no pain  -DM 0/10 - no pain  -BRANDON     Recorded by [DM] Lashanda Gerard, PT 08/22/18 1554 [BRANDON] Candy Taveras, OT 08/22/18 0955     Row Name                Wound 08/19/18 1900 coccyx pressure injury    Wound - Properties Group Date first assessed: 08/19/18 [JK] Time first assessed: 1900 [JK] Present On Admission : yes [JK] Location: coccyx [JK] Type: pressure injury [JK] Stage, Pressure Injury: Stage 1 [JK] Recorded by:  [JK] Marly Perla RN 08/20/18 0155    Row Name                Wound 08/19/18 2000 Left elbow skin tear    Wound - Properties Group Date first assessed: 08/19/18 [JK] Time first assessed: 2000 [JK] Side: Left [JK] Location: elbow [JK] Type: skin tear [JK] Recorded by:  [JK] Marly Perla RN 08/20/18 0157    Row Name                Wound 08/19/18 2000 Right calf skin tear    Wound - Properties Group Date first assessed: 08/19/18 [JK] Time first assessed: 2000 [JK] Side: Right [JK] Location: calf [JK] Type: skin tear [JK] Recorded by:  [JK] Marly Perla RN 08/20/18 0158    Row Name 08/22/18 1454 08/22/18 0841          Plan of Care Review    Plan of Care Reviewed With patient  -DM patient  -BRANDON     Recorded by [DM] Lashanda Gerard, PT 08/22/18 1554 [BRANDON] Candy Taveras, OT 08/22/18 0955     Row Name 08/22/18 0841             Outcome Summary/Treatment Plan (OT)     Daily Summary of Progress (OT) progress toward functional goals is good  -BRANDON      Barriers to Overall Progress (OT) confusion  -BRANDON      Plan for Continued Treatment (OT) Cont OT POC  -BRANDON      Recorded by [BRANDON] Candy Taveras, OT 08/22/18 0955      Row Name 08/22/18 1454             Outcome Summary/Treatment Plan (PT)    Daily Summary of Progress (PT) progress towards functional goals is fair  -DM      Anticipated Discharge Disposition (PT) skilled nursing facility  -DM      Recorded by [DM] Lashanda Gerard, PT 08/22/18 1554        User Key  (r) = Recorded By, (t) = Taken By, (c) = Cosigned By    Initials Name Effective Dates Discipline    BRANDON Candy Taveras, OT 06/08/18 -  OT    DM Lashanda Gerard, PT 06/19/15 -  PT    Marly Woodson, RN 06/16/16 -  Nurse          Wound 08/19/18 1900 coccyx pressure injury (Active)   Closure None 8/22/2018 11:49 AM   Base pink 8/22/2018 11:49 AM   Periwound pink;blanchable 8/22/2018 11:49 AM   Periwound Temperature warm 8/22/2018 11:49 AM   Periwound Skin Turgor soft 8/22/2018 11:49 AM   Dressing Care, Wound dressing changed;petroleum-based 8/22/2018  8:00 AM       Wound 08/19/18 2000 Left elbow skin tear (Active)   Dressing Appearance dry;intact 8/22/2018 11:49 AM   Closure MARTÍN 8/21/2018  9:45 PM   Base pink 8/22/2018 11:49 AM   Drainage Amount none 8/22/2018 11:49 AM   Care, Wound cleansed with;soap and water 8/22/2018  8:00 AM   Dressing Care, Wound dressing changed 8/22/2018  8:00 AM   Periwound Care, Wound barrier film applied 8/22/2018  8:00 AM       Wound 08/19/18 2000 Right calf skin tear (Active)   Dressing Appearance dry;intact 8/21/2018  9:45 PM   Closure None 8/22/2018 11:49 AM   Base pink 8/22/2018 11:49 AM   Drainage Amount none 8/22/2018 11:49 AM   Care, Wound cleansed with;soap and water 8/22/2018  8:00 AM   Dressing Care, Wound dressing changed 8/22/2018  8:00 AM   Periwound Care, Wound barrier ointment applied 8/22/2018  8:00 AM             Physical  Therapy Education     Title: PT OT SLP Therapies (Active)     Topic: Physical Therapy (Active)     Point: Mobility training (Active)    Learning Progress Summary     Learner Status Readiness Method Response Comment Documented by    Patient Active Acceptance E,D NR  DM 08/22/18 1554     Active Acceptance E NR  AS 08/20/18 1402     Active Eager E,D NR  DM 08/19/18 1314          Point: Home exercise program (Active)    Learning Progress Summary     Learner Status Readiness Method Response Comment Documented by    Patient Active Acceptance E,D NR  DM 08/22/18 1554     Active Acceptance E NR  AS 08/20/18 1402     Active Eager E,D NR  DM 08/19/18 1314          Point: Body mechanics (Active)    Learning Progress Summary     Learner Status Readiness Method Response Comment Documented by    Patient Active Acceptance E,D NR  DM 08/22/18 1554     Active Acceptance E NR  AS 08/20/18 1402     Active Eager E,D NR  DM 08/19/18 1314          Point: Precautions (Active)    Learning Progress Summary     Learner Status Readiness Method Response Comment Documented by    Patient Active Acceptance E,D NR  DM 08/22/18 1554     Active Acceptance E NR  AS 08/20/18 1402     Active Eager E,D NR  DM 08/19/18 1314                      User Key     Initials Effective Dates Name Provider Type Discipline    DM 06/19/15 -  Lashanda Gerard, PT Physical Therapist PT    AS 06/22/15 -  Alyssa Kearney, BHARAT Physical Therapy Assistant PT                    PT Recommendation and Plan  Anticipated Discharge Disposition (PT): skilled nursing facility  Planned Therapy Interventions (PT Eval): balance training, bed mobility training, gait training, home exercise program, patient/family education, postural re-education, stair training, strengthening, transfer training  Therapy Frequency (PT Clinical Impression): daily  Outcome Summary/Treatment Plan (PT)  Daily Summary of Progress (PT): progress towards functional goals is fair  Anticipated Discharge  Disposition (PT): skilled nursing facility  Plan of Care Reviewed With: patient  Progress: improving  Outcome Summary: Able to amb total of 2 ft w/ R wx & max A, but limited by fatigue from UIC all day, shivering,incont. , IV previously dislodged (dec. hydration), conf. level, low HGB (10.3), & impulsive behavior;Recommend NHP d/t spouse unable to care for           Outcome Measures     Row Name 08/22/18 1454 08/22/18 0841 08/20/18 1345       How much help from another person do you currently need...    Turning from your back to your side while in flat bed without using bedrails? 2  -DM  --  --    Moving from lying on back to sitting on the side of a flat bed without bedrails? 2  -DM  --  --    Moving to and from a bed to a chair (including a wheelchair)? 2  -DM  --  --    Standing up from a chair using your arms (e.g., wheelchair, bedside chair)? 2  -DM  --  --    Climbing 3-5 steps with a railing? 1  -DM  --  --    To walk in hospital room? 2  -DM  --  --    AM-PAC 6 Clicks Score 11  -DM  --  --       How much help from another is currently needed...    Putting on and taking off regular lower body clothing?  -- 1  -BRANDON 1  -BRANDON    Bathing (including washing, rinsing, and drying)  -- 2  -BRANDON 2  -BRANDON    Toileting (which includes using toilet bed pan or urinal)  -- 1  -BRANDON 1  -BRANDON    Putting on and taking off regular upper body clothing  -- 2  -BRANDON 2  -BRANDON    Taking care of personal grooming (such as brushing teeth)  -- 3  -BRANDON 2  -BRANDON    Eating meals  -- 3  -BRANDON 2  -BRANDON    Score  -- 12  -BRANDON 10  -BRANDON       Functional Assessment    Outcome Measure Options AM-PAC 6 Clicks Basic Mobility (PT)  -DM  -- AM-PAC 6 Clicks Daily Activity (OT)  -BRANDON    Row Name 08/20/18 9556             How much help from another person do you currently need...    Turning from your back to your side while in flat bed without using bedrails? 2  -AS      Moving from lying on back to sitting on the side of a flat bed without bedrails? 2  -AS      Moving to and  from a bed to a chair (including a wheelchair)? 2  -AS      Standing up from a chair using your arms (e.g., wheelchair, bedside chair)? 2  -AS      Climbing 3-5 steps with a railing? 1  -AS      To walk in hospital room? 1  -AS      AM-PAC 6 Clicks Score 10  -AS         Functional Assessment    Outcome Measure Options AM-PAC 6 Clicks Basic Mobility (PT)  -AS        User Key  (r) = Recorded By, (t) = Taken By, (c) = Cosigned By    Initials Name Provider Type    Candy English, OT Occupational Therapist    Lashanda Rodríguez, PT Physical Therapist    AS Alyssa Kearney, PTA Physical Therapy Assistant           Time Calculation:         PT Charges     Row Name 08/22/18 1600 08/22/18 1454          Time Calculation    Start Time 1454  -DM  --     PT Received On 08/22/18  -DM  --     PT Goal Re-Cert Due Date 08/29/18  -DM  --        Time Calculation- PT    Total Timed Code Minutes- PT 53 minute(s)  -DM  --        Timed Charges    11743 - Gait Training Minutes   -- 4  -DM     13615 - PT Therapeutic Activity Minutes  -- 49  -DM       User Key  (r) = Recorded By, (t) = Taken By, (c) = Cosigned By    Initials Name Provider Type    Lashanda Rodríguez, PT Physical Therapist        Therapy Suggested Charges     Code   Minutes Charges    08476 (CPT®) Hc Pt Neuromusc Re Education Ea 15 Min      28755 (CPT®) Hc Pt Ther Proc Ea 15 Min      13403 (CPT®) Hc Gait Training Ea 15 Min 4 1    12438 (CPT®) Hc Pt Therapeutic Act Ea 15 Min 49 3    88305 (CPT®) Hc Pt Manual Therapy Ea 15 Min      01222 (CPT®) Hc Pt Iontophoresis Ea 15 Min      83594 (CPT®) Hc Pt Elec Stim Ea-Per 15 Min      49848 (CPT®) Hc Pt Ultrasound Ea 15 Min      44484 (CPT®) Hc Pt Self Care/Mgmt/Train Ea 15 Min      59324 (CPT®) Hc Pt Prosthetic (S) Train Initial Encounter, Each 15 Min      35686 (CPT®) Hc Pt Orthotic(S)/Prosthetic(S) Encounter, Each 15 Min      06166 (CPT®) Hc Orthotic(S) Mgmt/Train Initial Encounter, Each 15min      Total  53 4        Therapy  Charges for Today     Code Description Service Date Service Provider Modifiers Qty    71671568530 HC PT THERAPEUTIC ACT EA 15 MIN 8/22/2018 Lashanda Gerard, PT GP 4          PT G-Codes  PT Professional Judgement Used?: Yes  Outcome Measure Options: AM-PAC 6 Clicks Basic Mobility (PT)  Score: 9  Functional Limitation: Mobility: Walking and moving around  Mobility: Walking and Moving Around Current Status (): At least 60 percent but less than 80 percent impaired, limited or restricted  Mobility: Walking and Moving Around Goal Status (): At least 40 percent but less than 60 percent impaired, limited or restricted    Lashanda Gerard, PT  8/22/2018

## 2018-08-22 NOTE — PROGRESS NOTES
[facility] Medicine Services  PROGRESS NOTE    Patient Name: Eliane Zamora  : 1929  MRN: [id number]    Date of Admission: 2018  Length of Stay: 2  Primary Care Physician: Jack Chilel MD    Subjective     CC: failure to thrive    HPI:  No new issues per patient. Pleasant this AM, sitting up and eating breakfast. No events noted per nursing staff.    Review of Systems  Gen- No fevers, chills  CV- No chest pain, palpitations  Resp- No cough, dyspnea  GI- No N/V/D, abd pain    Otherwise ROS is negative other than as per HPI    Objective     Vital Signs:   Temp:  [98.1 °F (36.7 °C)-99.5 °F (37.5 °C)] 98.1 °F (36.7 °C)  Heart Rate:  [69-87] 69  Resp:  [16-20] 16  BP: (123-137)/(47-97) 132/71      Physical Exam:  Constitutional: No acute distress, awake, alert  HENT: NCAT, mucous membranes moist  Respiratory: on RA, respiratory effort normal.  Cardiovascular: sinus rhythm, on tele  Gastrointestinal: Positive bowel sounds, soft, nontender, nondistended  Musculoskeletal: No bilateral ankle edema  Psychiatric: Appropriate affect. She is pleasant, calm and cooperative with exam.   Neurologic: Oriented x3, no focal deficits  Skin: No rashes    Results Reviewed:  I have personally reviewed current lab, radiology, and data and agree.      Results from last 7 days  Lab Units 18  1224 18  0555 18  0945   WBC 10*3/mm3 10.91* 13.03* 15.31*   HEMOGLOBIN g/dL 10.3* 10.1* 12.0   HEMATOCRIT % 31.2* 29.9* 35.9   PLATELETS 10*3/mm3 308 311 345       Results from last 7 days  Lab Units 18  0451 18  0555 18  0945   SODIUM mmol/L 126* 124* 131*   POTASSIUM mmol/L 4.5 4.5 4.7   CHLORIDE mmol/L 102 97* 98*   CO2 mmol/L 19.0* 24.0 24.0   BUN mg/dL 12 14 18   CREATININE mg/dL 0.74 0.93 1.07   GLUCOSE mg/dL 100 115* 85   CALCIUM mg/dL 6.8* 7.4* 8.3*   ALT (SGPT) U/L  --   --  28   AST (SGOT) U/L  --   --  25     Estimated Creatinine Clearance: 36.4 mL/min (by C-G  formula based on SCr of 0.74 mg/dL).  No results found for: BNP    Microbiology Results Abnormal     None        Imaging Results (last 24 hours)     ** No results found for the last 24 hours. **        Results for orders placed during the hospital encounter of 06/07/18   Adult Transthoracic Echo Complete W/ Cont if Necessary Per Protocol    Narrative · Left ventricular systolic function is normal.  · Estimated EF appears to be in the range of 66 - 70%.  · Left atrial cavity size is mildly dilated.        I have reviewed the medications.      diltiaZEM  mg Oral Q24H   heparin (porcine) 5,000 Units Subcutaneous Q8H   levothyroxine 75 mcg Oral Daily   pantoprazole 40 mg Oral Daily   QUEtiapine 6.25 mg Oral Nightly     Assessment / Plan     Hospital Problem List     * (Principal)Failure to thrive in adult    PAF (paroxysmal atrial fibrillation) (CMS/[facility])    Overview Addendum 11/16/2016 10:33 AM by Josefina Luis     1. Paroxysmal atrial fibrillation:  a. Rhythm control with sotalol.  b. Excessive bradyarrhythmias associated with digoxin.     Her Holter monitor showed essentially sinus rhythm with no excessive bradycardia and no excessive tachycardia.  Her EKG today shows sinus rhythm, corrected QT interval of 418 milliseconds. 05/07/15         Hypothyroidism    Overview Signed 11/16/2016 10:32 AM by Josefina Luis     On chronic supplementation         Dementia    Leukocytosis    Hypoglycemia    Hyponatremia         Brief Hospital Course to date:  89 yof with hx of dementia presents with worsening confusion and falls, admitted with adult failure to thrive. Admitted to [facility] in June 2018 for RLL pneumonia, discharged to rehab. Since returning home, multiple falls, poor PO intake and progressive decline.  having difficulty caring for her at home.      Assessment & Plan:    Acute encephalopathy, superimposed on chronic dementia  Leukocytosis  - Seems improved today, very alert. She is confused at baseline   - No  infectious etiology on initial work up. CXR/UA negative. Will repeat CXR, CBC and procalcitonin today   - Leukocytosis improving   - Mildly hypoxic on O2 monitor. Admitted here in June 2018 with RLL pneumonia     Frequent falls  Adult failure to thrive  Malnutrition  - PO intake improving, but states she forgets to eat and drink  - Nutrition following, supplements with meals.    Hypoglycemia, resolved    Hyponatremia  - suspect secondary to hypovolemia. Decrease IVFs to 50mL/hr today given her improved PO intake. Repeat AM BMP.    Hypothyroidism  - TSH up to 18 but normal T4. Suspect non-compliance. Continue home dose.     Paroxsymal atrial fibrillation  - rate controlled. Continue Diltiazem. No AC due to falls and age.     DVT prophylaxis: Lovenox   CODE STATUS:   Code Status and Medical Interventions:   Ordered at: 08/18/18 142     Level Of Support Discussed With:    Next of Kin (If No Surrogate)     Code Status:    No CPR     Medical Interventions (Level of Support Prior to Arrest):    Full     Disposition: Case management consulted. Unlikely that spouse will be able to care for patient moving forward. She will likely need long-term placement.     Electronically signed by Benita Cooney MD, 08/22/18, 12:54 PM.    I have reviewed the documentation above and agree.

## 2018-08-22 NOTE — PROGRESS NOTES
Continued Stay Note  [facility]     Patient Name: Eliane Zamora  MRN: [id number]  Today's Date: 8/22/2018    Admit Date: 8/18/2018          Discharge Plan     Row Name 08/22/18 1437       Plan    Plan [facility]    Patient/Family in Agreement with Plan yes    Plan Comments Per Tamanna at [facility], they can accept pt to a skilled bed on Thursday 8/23 if medically ready. Pt is confused at times. Discussed with her daughter Monika and she is agreeable. She can transport tomorrow around approx 4pm. HM notified in rounds. Nurse to call report to [phone] and fax summary  to [phone]. CM will cont to follow.               Discharge Codes    No documentation.       Expected Discharge Date and Time     Expected Discharge Date Expected Discharge Time    Aug 22, 2018             Patricia Brody

## 2018-08-22 NOTE — PLAN OF CARE
Problem: Patient Care Overview  Goal: Plan of Care Review  Outcome: Ongoing (interventions implemented as appropriate)   08/22/18 2555   Coping/Psychosocial   Plan of Care Reviewed With patient   Plan of Care Review   Progress improving   OTHER   Outcome Summary Pt. improved indep with self feeding and grooming today. Rolled min assist but max side to sit. Max of 2 to stand and to chair.

## 2018-08-22 NOTE — THERAPY TREATMENT NOTE
Acute Care - Occupational Therapy Treatment Note  [facility]     Patient Name: Eliane Zamora  : 1929  MRN: [id number]  Today's Date: 2018  Onset of Illness/Injury or Date of Surgery: 18  Date of Referral to OT: 18  Referring Physician: MD Shi    Admit Date: 2018       ICD-10-CM ICD-9-CM   1. Weakness R53.1 780.79   2. Dehydration E86.0 276.51   3. Dementia without behavioral disturbance, unspecified dementia type F03.90 294.20   4. Impaired mobility and ADLs Z74.09 799.89   5. Impaired functional mobility, balance, gait, and endurance Z74.09 V49.89     Patient Active Problem List   Diagnosis   • PAF (paroxysmal atrial fibrillation) (CMS/HCC)   • Hypothyroidism   • Abnormal stress echo   • Osteoporosis   • Hypercholesterolemia   • GERD (gastroesophageal reflux disease)   • Right lower lobe pneumonia (CMS/HCC)   • Dementia   • Leukocytosis   • Hypoglycemia   • Failure to thrive in adult   • Hyponatremia     Past Medical History:   Diagnosis Date   • Abnormal stress echo     History of abnormal stress echocardiogram with recent echocardiogram showing borderline LV function.    • Dementia    • GERD (gastroesophageal reflux disease)    • Hypercholesterolemia    • Hypothyroidism    • Osteoporosis     /Mild degenerative joint disease   • PAF (paroxysmal atrial fibrillation) (CMS/HCC)    • UTI (urinary tract infection)     Current urinary tract infection by laboratory value, 2013:  Less than 100,000 units/mL of E. coli.     Past Surgical History:   Procedure Laterality Date   • BREAST SURGERY      benign breast biopsy   •  SECTION         Therapy Treatment          Rehabilitation Treatment Summary     Row Name 18 0841             Treatment Time/Intention    Discipline occupational therapist  -BRANDON      Document Type therapy note (daily note)  -BRANDON      Subjective Information no complaints  -BRANDON      Mode of Treatment individual therapy;occupational therapy  -BRANDON       Patient/Family Observations Pt. supine in bed asleep.  When moved noted urine and stool incontinence.  IV/telemetry.  -BRANDON      Care Plan Review care plan/treatment goals reviewed;risks/benefits reviewed;current/potential barriers reviewed  -BRANDON      Patient Effort adequate  -BRANDON      Existing Precautions/Restrictions fall  -BRANDON      Recorded by [BRANDON] Candy Taveras, TEA 08/22/18 0955      Row Name 08/22/18 0841             Vital Signs    Pre Systolic BP Rehab --   vitals stable throughout tx.  Nurse OK tx.  -BRANDON      Pre SpO2 (%) 91  -BRANDON      O2 Delivery Pre Treatment room air  -BRANDON      Pre Patient Position Supine  -BRANDON      Intra Patient Position Sitting  -BRANDON      Post Patient Position Sitting  -BRANDON      Recorded by [BRANDON] Candy Taveras, TEA 08/22/18 0955      Row Name 08/22/18 0841             Cognitive Assessment/Intervention- PT/OT    Affect/Mental Status (Cognitive) flat/blunted affect  -BRANDON      Orientation Status (Cognition) oriented to;person;disoriented to;place;situation;time  -BRANDON      Follows Commands (Cognition) follows one step commands;75-90% accuracy;verbal cues/prompting required;repetition of directions required  -BRANDON      Safety Deficit (Cognitive) moderate deficit  -BRANDON      Personal Safety Interventions fall prevention program maintained;gait belt;nonskid shoes/slippers when out of bed  -BRANDON      Recorded by [BRANDON] Candy Taveras OT 08/22/18 0955      Row Name 08/22/18 0841             Safety Issues, Functional Mobility    Safety Issues Affecting Function (Mobility) ability to follow commands;problem solving;sequencing abilities  -BRANDON      Impairments Affecting Function (Mobility) balance;cognition;endurance/activity tolerance;strength  -BRANDON      Recorded by [BRANDON] Candy Taveras OT 08/22/18 0955      Row Name 08/22/18 0841             Bed Mobility Assessment/Treatment    Bed Mobility Assessment/Treatment rolling left;rolling right;scooting/bridging;supine-sit  -BRANDON      Rolling Left [place] (Bed Mobility)  minimum assist (75% patient effort);verbal cues;nonverbal cues (demo/gesture)  -BRANDON      Rolling Right [place] (Bed Mobility) verbal cues;nonverbal cues (demo/gesture);minimum assist (75% patient effort)  -BRANDON      Scooting/Bridging [place] (Bed Mobility) dependent (less than 25% patient effort);verbal cues;nonverbal cues (demo/gesture)   pt. did bridge x 3 with removing wet pads  -BRANDON      Supine-Sit [place] (Bed Mobility) verbal cues;moderate assist (50% patient effort)  -BRANDON      Bed Mobility, Safety Issues decreased use of arms for pushing/pulling;decreased use of legs for bridging/pushing;impaired trunk control for bed mobility;cognitive deficits limit understanding  -BRANDON      Assistive Device (Bed Mobility) draw sheet;bed rails  -BRANDON      Comment (Bed Mobility) Pt. needing step by step cueing.  -BRANDON      Recorded by [BRANDON] Candy Taveras, OT 08/22/18 0955      Row Name 08/22/18 0841             Transfer Assessment/Treatment    Transfer Assessment/Treatment sit-stand transfer;stand-sit transfer;bed-chair transfer  -BRANDON      Recorded by [BRANDON] Candy Taveras, OT 08/22/18 1000      Row Name 08/22/18 0841             Bed-Chair Transfer    Bed-Chair [place] (Transfers) maximum assist (25% patient effort);2 person assist  -BRANDON      Assistive Device (Bed-Chair Transfers) --   UE support and gait belt  -BRANDON      Recorded by [BRANDON] Candy Taveras, OT 08/22/18 1000      Row Name 08/22/18 0841             Sit-Stand Transfer    Sit-Stand [place] (Transfers) maximum assist (25% patient effort);2 person assist;verbal cues;nonverbal cues (demo/gesture)  -BRANDON      Assistive Device (Sit-Stand Transfers) --   gait belt and UE support  -BRANDON      Recorded by [BRANDON] Candy Taveras, OT 08/22/18 0955      Row Name 08/22/18 0841             Stand-Sit Transfer    Stand-Sit [place] (Transfers) maximum assist (25% patient effort);2 person assist;nonverbal cues (demo/gesture);verbal cues  -BRANDON      Assistive Device  (Stand-Sit Transfers) --   gait belt and UE support  -BRANDON      Recorded by [BRANDON] Candy Taveras, OT 08/22/18 0955      Row Name 08/22/18 0841             ADL Assessment/Intervention    40635 - OT Self Care/Mgmt Minutes 18  -BRANDON      BADL Assessment/Intervention grooming;feeding;toileting  -BRANDON      Recorded by [BRANDON] Candy Taveras, OT 08/22/18 0955      Row Name 08/22/18 0841             Grooming Assessment/Training    [place] Level (Grooming) hair care, combing/brushing;supervision;verbal cues;wash face, hands;minimum assist (75% patient effort)  -BRANDON      Grooming Position supine;supported sitting  -BRANDON      Comment (Grooming) Pt. able to comb hair with setup today and wash face, but some assist to fully clean hands thoroughly.  -BRANDON      Recorded by [BRANDON] Candy Taveras, OT 08/22/18 0955      Row Name 08/22/18 0841             Self-Feeding Assessment/Training    [place] Level (Feeding) finger foods;liquids to mouth;set up   cues to intiate and need to eat  -BRANDON      Self-Feeding Assess/Train, Spillage Amount minimal  -BRANDON      Position (Self-Feeding) supported sitting  -BRANDON      Comment (Feeding) pt. able to  drinks and drink on own today and eat finger foods.  -BRANDON      Recorded by [BRANDON] Candy Taveras, OT 08/22/18 0955      Row Name 08/22/18 0841             Toileting Assessment/Training    Comment (Toileting) Pt. with wet soiled pads.  Pt. dependent to clean but bridged so pad could be removed and rolled min assist to be cleaned further.  -BRANDON      Recorded by [BRANDON] Candy Taveras, OT 08/22/18 0955      Row Name 08/22/18 0841             BADL Safety/Performance    Impairments, BADL Safety/Performance balance;cognition;endurance/activity tolerance;strength  -BRANDON      Cognitive Impairments, BADL Safety/Performance insight into deficits/self awareness;judgment;problem solving/reasoning;safety precaution awareness;safety precaution follow-through;sequencing abilities  -BRANDON      Progress in BADL Status  independence level  -BRANDON      Recorded by [BRANDON] Candy Taveras, OT 08/22/18 0955      Row Name 08/22/18 0841             Therapeutic Exercise    38908 - OT Therapeutic Exercise Minutes 5  -BRANDON      26648 - OT Therapeutic Activity Minutes 13  -BRANDON      Recorded by [BRANDON] Candy Taveras, OT 08/22/18 0955      Row Name 08/22/18 0841             Therapeutic Exercise    Upper Extremity Range of Motion (Therapeutic Exercise) shoulder flexion/extension, bilateral;shoulder abduction/adduction, bilateral;shoulder horizontal abduction/adduction, bilateral;elbow flexion/extension, bilateral  -BRANDON      Exercise Type (Therapeutic Exercise) AROM (active range of motion);AAROM (active assistive range of motion)   pt. at time some [facility] guidance  -BRANDON      Position (Therapeutic Exercise) supine  -BRANDON      Sets/Reps (Therapeutic Exercise) 1/10  -BRANDON      Recorded by [BRANDON] Candy Taveras, TEA 08/22/18 0955      Row Name 08/22/18 0841             Static Sitting Balance    Level of [place] (Unsupported Sitting, Static Balance) contact guard assist  -BRANDON      Sitting Position (Unsupported Sitting, Static Balance) sitting on edge of bed  -BRANDON      Time Able to Maintain Position (Unsupported Sitting, Static Balance) 1 to 2 minutes   pt. needing BUe support.  -BRANDON      Recorded by [BRANDON] Candy Taveras OT 08/22/18 0955      Row Name 08/22/18 0841             Dynamic Sitting Balance    Level of [place], Reaches Outside Midline (Sitting, Dynamic Balance) minimal assist, 75% patient effort   with grooming task  -BRANDON      Sitting Position, Reaches Outside Midline (Sitting, Dynamic Balance) sitting on edge of bed  -BRANDON      Recorded by [BRANDON] Candy Taveras OT 08/22/18 0955      Row Name 08/22/18 0841             Positioning and Restraints    Pre-Treatment Position in bed  -BRANDON      Post Treatment Position chair  -BRANDON      In Chair reclined;call light within reach;encouraged to call for assist;exit alarm on;waffle cushion;RLE elevated  -BRANDON       Recorded by [BRANDON] Candy Taveras, OT 08/22/18 0955      Row Name 08/22/18 0841             Pain Scale: Numbers Pre/Post-Treatment    Pain Scale: Numbers, Pretreatment 0/10 - no pain  -BRANDON      Pain Scale: Numbers, Post-Treatment 0/10 - no pain  -BRANDON      Recorded by [BRANDON] Candy Taveras, OT 08/22/18 0955      Row Name                Wound 08/19/18 1900 coccyx pressure injury    Wound - Properties Group Date first assessed: 08/19/18 [JK] Time first assessed: 1900 [JK] Present On Admission : yes [JK] Location: coccyx [JK] Type: pressure injury [JK] Stage, Pressure Injury: Stage 1 [JK] Recorded by:  [RD] Marly Perla RN 08/20/18 0155    Row Name                Wound 08/19/18 2000 Left elbow skin tear    Wound - Properties Group Date first assessed: 08/19/18 [JK] Time first assessed: 2000 [JK] Side: Left [JK] Location: elbow [JK] Type: skin tear [JK] Recorded by:  [RD] Marly Perla RN 08/20/18 0157    Row Name                Wound 08/19/18 2000 Right calf skin tear    Wound - Properties Group Date first assessed: 08/19/18 [JK] Time first assessed: 2000 [JK] Side: Right [JK] Location: calf [JK] Type: skin tear [JK] Recorded by:  [RD] Marly Perla RN 08/20/18 0158    Row Name 08/22/18 0841             Plan of Care Review    Plan of Care Reviewed With patient  -BRANDON      Recorded by [BRANDON] Candy Taveras, OT 08/22/18 0955      Row Name 08/22/18 0841             Outcome Summary/Treatment Plan (OT)    Daily Summary of Progress (OT) progress toward functional goals is good  -BRANDON      Barriers to Overall Progress (OT) confusion  -BRANDON      Plan for Continued Treatment (OT) Cont OT POC  -BRANDON      Recorded by [BRANDON] Candy Taveras, OT 08/22/18 0955        User Key  (r) = Recorded By, (t) = Taken By, (c) = Cosigned By    Initials Name Effective Dates Discipline    BRANDON Candy Taveras, OT 06/08/18 -  OT    Marly Woodson RN 06/16/16 -  Nurse        Wound 08/19/18 1900 coccyx pressure injury (Active)    Periwound pink;blanchable 8/21/2018  9:45 PM       Wound 08/19/18 2000 Left elbow skin tear (Active)   Dressing Appearance dry;intact 8/21/2018  9:45 PM   Closure MARTÍN 8/21/2018  9:45 PM       Wound 08/19/18 2000 Right calf skin tear (Active)   Dressing Appearance dry;intact 8/21/2018  9:45 PM             OT Rehab Goals     Row Name 08/22/18 0841             Bed Mobility Goal 1 (OT)    Progress/Outcomes (Bed Mobility Goal 1, OT) goal partially met   met rolling and bridging.  -BRANDON         Transfer Goal 1 (OT)    Progress/Outcome (Transfer Goal 1, OT) goal ongoing  -BRANDON         Toileting Goal 1 (OT)    Progress/Outcome (Toileting Goal 1, OT) goal ongoing  -BRANDON         Grooming Goal 1 (OT)    Progress/Outcome (Grooming Goal 1, OT) goal partially met   met hair combing.  -BRANDON        User Key  (r) = Recorded By, (t) = Taken By, (c) = Cosigned By    Initials Name Provider Type Discipline    Candy English, OT Occupational Therapist OT        Occupational Therapy Education     Title: PT OT SLP Therapies (Active)     Topic: Occupational Therapy (Active)     Point: ADL training (Active)     Description: Instruct learner(s) on proper safety adaptation and remediation techniques during self care or transfers.   Instruct in proper use of assistive devices.   Learning Progress Summary     Learner Status Readiness Method Response Comment Documented by    Patient Active Acceptance E,D NR How to bridge to assist with toileting, benefits of activity, need for nutritional intake.  08/22/18 0956     Active Acceptance E,D NR balance posture standing, ADL completion of task, benefits of activity, UE ther.exer.  08/20/18 1416     Done Acceptance E VU,NR role OT, reason for consult, noted deficits, goals setting.  Pt. does not appear to fullly understand, spouse partiallly and g.son fully.  08/19/18 1050    Family Done Acceptance E VU,NR role OT, reason for consult, noted deficits, goals setting.  Pt. does not appear to fullly  understand, spouse partiallly and g.son fully.  08/19/18 1050          Point: Home exercise program (Active)     Description: Instruct learner(s) on appropriate technique for monitoring, assisting and/or progressing therapeutic exercises/activities.   Learning Progress Summary     Learner Status Readiness Method Response Comment Documented by    Patient Active Acceptance E,D NR How to bridge to assist with toileting, benefits of activity, need for nutritional intake.  08/22/18 0956     Active Acceptance E,D NR balance posture standing, ADL completion of task, benefits of activity, UE ther.exer.  08/20/18 1416          Point: Precautions (Active)     Description: Instruct learner(s) on prescribed precautions during self-care and functional transfers.   Learning Progress Summary     Learner Status Readiness Method Response Comment Documented by    Patient Active Acceptance E,D NR How to bridge to assist with toileting, benefits of activity, need for nutritional intake.  08/22/18 0956     Active Acceptance E,D NR balance posture standing, ADL completion of task, benefits of activity, UE ther.exer.  08/20/18 1416     Done Acceptance E VU,NR role OT, reason for consult, noted deficits, goals setting.  Pt. does not appear to fullly understand, spouse partiallly and g.son fully.  08/19/18 1050    Family Done Acceptance E VU,NR role OT, reason for consult, noted deficits, goals setting.  Pt. does not appear to fullly understand, spouse partiallly and g.son fully.  08/19/18 1050          Point: Body mechanics (Active)     Description: Instruct learner(s) on proper positioning and spine alignment during self-care, functional mobility activities and/or exercises.   Learning Progress Summary     Learner Status Readiness Method Response Comment Documented by    Patient Active Acceptance E,D NR How to bridge to assist with toileting, benefits of activity, need for nutritional intake.  08/22/18 0956                       User Key     Initials Effective Dates Name Provider Type Discipline    BRANDON 06/08/18 -  Candy Taveras, OT Occupational Therapist OT                OT Recommendation and Plan  Outcome Summary/Treatment Plan (OT)  Daily Summary of Progress (OT): progress toward functional goals is good  Barriers to Overall Progress (OT): confusion  Plan for Continued Treatment (OT): Cont OT POC  Anticipated Equipment Needs at Discharge (OT):  (possible w/c use if not already available if can return home)  Anticipated Discharge Disposition (OT): skilled nursing facility, inpatient rehabilitation facility (question ability to return home under only  care)  Planned Therapy Interventions (OT Eval): activity tolerance training, BADL retraining, cognitive/visual perception retraining, functional balance retraining, occupation/activity based interventions, patient/caregiver education/training, strengthening exercise, transfer/mobility retraining  Therapy Frequency (OT Eval): daily  Daily Summary of Progress (OT): progress toward functional goals is good  Plan of Care Review  Plan of Care Reviewed With: patient  Plan of Care Reviewed With: patient  Outcome Summary: Pt. improved indep with self feeding and grooming today.  Rolled min assist but max side to sit.  Max of 2 to stand and to chair.        Outcome Measures     Row Name 08/22/18 0841 08/20/18 1345 08/20/18 1339       How much help from another person do you currently need...    Turning from your back to your side while in flat bed without using bedrails?  --  -- 2  -AS    Moving from lying on back to sitting on the side of a flat bed without bedrails?  --  -- 2  -AS    Moving to and from a bed to a chair (including a wheelchair)?  --  -- 2  -AS    Standing up from a chair using your arms (e.g., wheelchair, bedside chair)?  --  -- 2  -AS    Climbing 3-5 steps with a railing?  --  -- 1  -AS    To walk in hospital room?  --  -- 1  -AS    AM-PAC 6 Clicks Score  --  -- 10   -AS       How much help from another is currently needed...    Putting on and taking off regular lower body clothing? 1  -BRANDON 1  -BRANDON  --    Bathing (including washing, rinsing, and drying) 2  -BRANDON 2  -BRANDON  --    Toileting (which includes using toilet bed pan or urinal) 1  -BRANDON 1  -BRANDON  --    Putting on and taking off regular upper body clothing 2  -BRANDON 2  -BRANDON  --    Taking care of personal grooming (such as brushing teeth) 3  -BRANDON 2  -BRANDON  --    Eating meals 3  -BRANDON 2  -BRANDON  --    Score 12  -BRANDON 10  -BRANDON  --       Functional Assessment    Outcome Measure Options  -- AM-PAC 6 Clicks Daily Activity (OT)  -BRANDON AM-PAC 6 Clicks Basic Mobility (PT)  -AS    Row Name 08/19/18 1144             How much help from another person do you currently need...    Turning from your back to your side while in flat bed without using bedrails? 2  -DM      Moving from lying on back to sitting on the side of a flat bed without bedrails? 2  -DM      Moving to and from a bed to a chair (including a wheelchair)? 1  -DM      Standing up from a chair using your arms (e.g., wheelchair, bedside chair)? 2  -DM      Climbing 3-5 steps with a railing? 1  -DM      To walk in hospital room? 1  -DM      AM-PAC 6 Clicks Score 9  -DM         Functional Assessment    Outcome Measure Options AM-PAC 6 Clicks Basic Mobility (PT)  -DM        User Key  (r) = Recorded By, (t) = Taken By, (c) = Cosigned By    Initials Name Provider Type    Candy English, OT Occupational Therapist    Lashanda Rodríguez, PT Physical Therapist    AS Alyssa Kearney, BHARAT Physical Therapy Assistant           Time Calculation:         Time Calculation- OT     Row Name 08/22/18 1000 08/22/18 0841          Time Calculation- OT    OT Start Time 0841  -BRANDON  --     OT Received On 08/22/18  -BRANDON  --     OT Goal Re-Cert Due Date 08/29/18  -BRANDON  --        Timed Charges    31295 - OT Therapeutic Exercise Minutes  -- 5  -BRANDON     98628 - OT Therapeutic Activity Minutes  -- 13  -BRANDON     43652 - OT Self  Care/Mgmt Minutes  -- 18  -BRANDON       User Key  (r) = Recorded By, (t) = Taken By, (c) = Cosigned By    Initials Name Provider Type    Candy English, OT Occupational Therapist           Therapy Suggested Charges     Code   Minutes Charges    58799 (CPT®) Hc Ot Neuromusc Re Education Ea 15 Min      50495 (CPT®) Hc Ot Ther Proc Ea 15 Min 5     71367 (CPT®) Hc Ot Therapeutic Act Ea 15 Min 13 1    18662 (CPT®) Hc Ot Manual Therapy Ea 15 Min      14828 (CPT®) Hc Ot Iontophoresis Ea 15 Min      35288 (CPT®) Hc Ot Elec Stim Ea-Per 15 Min      87736 (CPT®) Hc Ot Ultrasound Ea 15 Min      04128 (CPT®) Hc Ot Self Care/Mgmt/Train Ea 15 Min 18 1    Total  36 2        Therapy Charges for Today     Code Description Service Date Service Provider Modifiers Qty    89494452906 HC OT THERAPEUTIC ACT EA 15 MIN 8/22/2018 Candy Taveras, OT GO 1    51534359443 HC OT SELF CARE/MGMT/TRAIN EA 15 MIN 8/22/2018 Candy Taveras, OT GO 1    36885404282 HC OT THER SUPP EA 15 MIN 8/22/2018 aCndy Taveras, OT GO 1          OT G-codes  OT Professional Judgement Used?: Yes  OT Functional Scales Options: AM-PAC 6 Clicks Daily Activity (OT)  Score: 10  Functional Limitation: Self care  Self Care Current Status (): At least 60 percent but less than 80 percent impaired, limited or restricted  Self Care Goal Status (): At least 40 percent but less than 60 percent impaired, limited or restricted    Candy Taveras OT  8/22/2018

## 2018-08-22 NOTE — PLAN OF CARE
Problem: Patient Care Overview  Goal: Plan of Care Review   08/22/18 6547   OTHER   Outcome Summary Pt. slept during the evening and most of the night; confused and continues to require re-orientation; VSS; incontinence; maintance fluids continued at 50 ml/hr; NSR on the monitor; no other problems reported; continue to monitor.

## 2018-08-23 VITALS
TEMPERATURE: 97.9 F | OXYGEN SATURATION: 94 % | HEIGHT: 62 IN | RESPIRATION RATE: 16 BRPM | DIASTOLIC BLOOD PRESSURE: 83 MMHG | WEIGHT: 106.48 LBS | HEART RATE: 67 BPM | SYSTOLIC BLOOD PRESSURE: 138 MMHG | BODY MASS INDEX: 19.6 KG/M2

## 2018-08-23 LAB
ANION GAP SERPL CALCULATED.3IONS-SCNC: 5 MMOL/L (ref 3–11)
BUN BLD-MCNC: 13 MG/DL (ref 9–23)
BUN/CREAT SERPL: 15.7 (ref 7–25)
CALCIUM SPEC-SCNC: 7.2 MG/DL (ref 8.7–10.4)
CHLORIDE SERPL-SCNC: 101 MMOL/L (ref 99–109)
CO2 SERPL-SCNC: 23 MMOL/L (ref 20–31)
CREAT BLD-MCNC: 0.83 MG/DL (ref 0.6–1.3)
GFR SERPL CREATININE-BSD FRML MDRD: 65 ML/MIN/1.73
GLUCOSE BLD-MCNC: 119 MG/DL (ref 70–100)
POTASSIUM BLD-SCNC: 4.8 MMOL/L (ref 3.5–5.5)
SODIUM BLD-SCNC: 129 MMOL/L (ref 132–146)

## 2018-08-23 PROCEDURE — 99239 HOSP IP/OBS DSCHRG MGMT >30: CPT | Performed by: INTERNAL MEDICINE

## 2018-08-23 PROCEDURE — 25010000002 HEPARIN (PORCINE) PER 1000 UNITS: Performed by: INTERNAL MEDICINE

## 2018-08-23 PROCEDURE — 80048 BASIC METABOLIC PNL TOTAL CA: CPT | Performed by: INTERNAL MEDICINE

## 2018-08-23 RX ORDER — PSEUDOEPHEDRINE HCL 30 MG
100 TABLET ORAL 2 TIMES DAILY PRN
Start: 2018-08-23

## 2018-08-23 RX ORDER — QUETIAPINE FUMARATE 25 MG/1
6.25 TABLET, FILM COATED ORAL NIGHTLY
Start: 2018-08-23

## 2018-08-23 RX ADMIN — PANTOPRAZOLE SODIUM 40 MG: 40 TABLET, DELAYED RELEASE ORAL at 09:21

## 2018-08-23 RX ADMIN — LEVOTHYROXINE SODIUM 75 MCG: 75 TABLET ORAL at 09:21

## 2018-08-23 RX ADMIN — DILTIAZEM HYDROCHLORIDE 120 MG: 120 CAPSULE, EXTENDED RELEASE ORAL at 09:21

## 2018-08-23 RX ADMIN — HEPARIN SODIUM 5000 UNITS: 5000 INJECTION, SOLUTION INTRAVENOUS; SUBCUTANEOUS at 05:13

## 2018-08-23 NOTE — PLAN OF CARE
Problem: Patient Care Overview  Goal: Plan of Care Review   08/23/18 1148   Coping/Psychosocial   Plan of Care Reviewed With patient   Plan of Care Review   Progress improving

## 2018-08-23 NOTE — DISCHARGE SUMMARY
[facility] Medicine Services  DISCHARGE SUMMARY    Patient Name: Eliane Zamora  : 1929  MRN: [id number]    Date of Admission: 2018  Date of Discharge:  2018  Primary Care Physician: Jack Chilel MD    Consults     No orders found from 2018 to 2018.        Hospital Course     Presenting Problem:   Failure to thrive in adult [R62.7]  Hyponatremia [E87.1]    Active Hospital Problems    Diagnosis Date Noted   • **Failure to thrive in adult [R62.7] 2018   • Leukocytosis [D72.829] 2018   • Hypoglycemia [E16.2] 2018   • Hyponatremia [E87.1] 2018   • Dementia [F03.90] 2018   • PAF (paroxysmal atrial fibrillation) (CMS/Formerly [facility]) [I48.0]    • Hypothyroidism [E03.9]       Resolved Hospital Problems    Diagnosis Date Noted Date Resolved   No resolved problems to display.          Hospital Course:  Eliane Zamora is a 89 y.o. female with history of dementia who presented with worsening confusion, falls and admitted for failure to thrive. Of note, patient was admitted to [facility] in 2018 with RLL PNA, at that time she was discharged to rehab. Since going home, she has had multiple falls, poor PO intake and progressive decline. Her  has been unable to care for her at home. Hospital course was notable for acute encephalopathy on chronic dementia- it was noted that this encephalopathy was improved, had added seroquel which helped. She is confused at baseline. Nutrition was consulted and assisted for adult failure to thrive, PO intake was noted to improve during hospital stay. Patient was also noted to have hyponatremia which was thought to be 2/2 volume depletion, patient rec'd IVF inpatient with improvement. Her thyroid function studies noted an elevated TSH up to 18, this was thought to be secondary to noncompliance, synthroid was restarted. Finally her chronic issue of pAF was rate controlled with home medication of diltiazam. No  anticoagulation due to h/o chronic falls.       Discharge Follow Up Recommendations for labs/diagnostics:  None    Day of Discharge     HPI:   No overnight events. Doing well. Lying in bed.     Review of Systems  Gen- No fevers, chills  CV- No chest pain, palpitations  Resp- No cough, dyspnea  GI- No N/V/D, abd pain      Otherwise ROS is negative except as mentioned in the HPI.    Vital Signs:   Temp:  [98.2 °F (36.8 °C)-98.4 °F (36.9 °C)] 98.2 °F (36.8 °C)  Heart Rate:  [73-83] 73  Resp:  [20] 20  BP: (134-142)/(66-68) 142/66     Physical Exam:  Constitutional: No acute distress, awake, alert  HENT: NCAT, mucous membranes moist  Respiratory: Clear to auscultation bilaterally, respiratory effort normal   Cardiovascular: RRR, no murmurs, rubs, or gallops, palpable pedal pulses bilaterally  Gastrointestinal: Positive bowel sounds, soft, nontender, nondistended  Musculoskeletal: No bilateral ankle edema  Psychiatric: Appropriate affect, cooperative  Neurologic: Oriented and alert, Cranial Nerves grossly intact to confrontation, speech clear  Skin: No rashes      Pertinent  and/or Most Recent Results       Results from last 7 days  Lab Units 08/23/18  0459 08/20/18  1224 08/20/18  0451 08/19/18  0555 08/18/18  0945   WBC 10*3/mm3  --  10.91*  --  13.03* 15.31*   HEMOGLOBIN g/dL  --  10.3*  --  10.1* 12.0   HEMATOCRIT %  --  31.2*  --  29.9* 35.9   PLATELETS 10*3/mm3  --  308  --  311 345   SODIUM mmol/L 129*  --  126* 124* 131*   POTASSIUM mmol/L 4.8  --  4.5 4.5 4.7   CHLORIDE mmol/L 101  --  102 97* 98*   CO2 mmol/L 23.0  --  19.0* 24.0 24.0   BUN mg/dL 13  --  12 14 18   CREATININE mg/dL 0.83  --  0.74 0.93 1.07   GLUCOSE mg/dL 119*  --  100 115* 85   CALCIUM mg/dL 7.2*  --  6.8* 7.4* 8.3*       Results from last 7 days  Lab Units 08/18/18  0945   BILIRUBIN mg/dL 1.1   ALK PHOS U/L 86   ALT (SGPT) U/L 28   AST (SGOT) U/L 25           Invalid input(s): TG, LDLCALC, LDLREALC    Results from last 7 days  Lab Units  08/18/18  0945   TSH mIU/mL 18.270*     Brief Urine Lab Results  (Last result in the past 365 days)      Color   Clarity   Blood   Leuk Est   Nitrite   Protein   CREAT   Urine HCG        08/18/18 1459 Yellow Clear Negative Trace(A) Negative Negative               Microbiology Results Abnormal     None          Imaging Results (all)     Procedure Component Value Units Date/Time    XR Chest 1 View [[id number]] Collected:  08/20/18 1205     Updated:  08/20/18 1255    Narrative:       EXAMINATION: XR CHEST 1 VW-      INDICATION: Intermittent hypoxia, recent pneumonia in June;  R53.1-Weakness; E86.0-Dehydration; F03.90-Unspecified dementia without  behavioral disturbance; Z74.09-Other reduced mobility.      COMPARISON: 08/18/2018.     FINDINGS: Mildly decreased lung volumes from recent prior comparison  with bibasilar atelectasis and/or scarring from hypoventilatory  findings. No focal consolidation. Chronic lung changes again noted in  the background. Cardiac size within normal limits. No pneumothorax or  significant pleural effusion. Degenerative change of the spine.       Impression:       Mildly decreased lung volumes from recent prior comparison  08/18/2018 with hypoventilatory findings, however, no focal  consolidation or significant effusion.     D:  08/20/2018  E:  08/20/2018     This report was finalized on 8/20/2018 12:53 PM by Dr. Benigno Shen.       XR Chest 1 View [[id number]] Collected:  08/18/18 1409     Updated:  08/18/18 2301    Narrative:          EXAMINATION: XR CHEST, SINGLE VIEW - 8/18/2018     INDICATION: Weakness, dizziness, altered mental status.     COMPARISON: 7/20/2018.     FINDINGS: The heart and pulmonary vasculature appear normal in size.  Mild chronic-appearing lung changes appear stable from the prior study,  allowing for lighter film technique today. No lung consolidation,  effusion, edema, or pneumothorax is seen.           Impression:       Stable mild chronic-appearing interstitial lung  changes.     DICTATED:   8/18/2018  EDITED/ls :   8/18/2018      This report was finalized on 8/18/2018 10:59 PM by DR. Andrew Quijano MD.                       Results for orders placed during the hospital encounter of 06/07/18   Adult Transthoracic Echo Complete W/ Cont if Necessary Per Protocol    Narrative · Left ventricular systolic function is normal.  · Estimated EF appears to be in the range of 66 - 70%.  · Left atrial cavity size is mildly dilated.            Discharge Details        Discharge Medications      New Medications      Instructions Start Date   docusate sodium 100 MG capsule   100 mg, Oral, 2 Times Daily PRN      QUEtiapine 25 MG tablet  Commonly known as:  SEROquel   6.25 mg, Oral, Nightly         Continue These Medications      Instructions Start Date   CENTRUM SILVER ADULT 50+ PO   1 tablet, Oral, Daily      diltiazem  MG 24 hr capsule  Commonly known as:  DILACOR XR   120 mg, Oral, Daily      levothyroxine 75 MCG tablet  Commonly known as:  SYNTHROID, LEVOTHROID   75 mcg, Oral, Daily      megestrol acetate 400 MG/10ML suspension oral suspension  Commonly known as:  MEGACE   400 mg, Oral, Daily      pantoprazole 40 MG EC tablet  Commonly known as:  PROTONIX   40 mg, Oral, Daily               Discharge Disposition:  Skilled Nursing Facility (DC - External)    Discharge Diet:  Regular as tolerated with boost     Discharge Activity:   as tolerated         Special Instructions:  None    Code Status/Level of Support:  Code Status and Medical Interventions:   Ordered at: 08/18/18 1427     Level Of Support Discussed With:    Next of Kin (If No Surrogate)     Code Status:    No CPR     Medical Interventions (Level of Support Prior to Arrest):    Full       Future Appointments  Date Time Provider Department Center   11/14/2018 9:15 AM Thompson Navarrete III, MD [facility] None           Time Spent on Discharge:  35 minutes    Electronically signed by Benita Cooney MD, 08/23/18, 7:44 AM.

## 2018-08-23 NOTE — PLAN OF CARE
Problem: Fluid Volume Deficit (Adult)  Goal: Identify Related Risk Factors and Signs and Symptoms  Outcome: Ongoing (interventions implemented as appropriate)    Goal: Optimal Fluid Balance  Outcome: Ongoing (interventions implemented as appropriate)      Problem: Patient Care Overview  Goal: Plan of Care Review  Outcome: Ongoing (interventions implemented as appropriate)   08/23/18 0424   Coping/Psychosocial   Plan of Care Reviewed With patient   Plan of Care Review   Progress no change   OTHER   Outcome Summary VSS. Pt not in pain. Slept during the whole night. Waitng for D/C, around 4 pm to [place]. Will continue to monitor.      Goal: Individualization and Mutuality  Outcome: Ongoing (interventions implemented as appropriate)    Goal: Discharge Needs Assessment  Outcome: Ongoing (interventions implemented as appropriate)    Goal: Interprofessional Rounds/Family Conf  Outcome: Ongoing (interventions implemented as appropriate)      Problem: Fall Risk (Adult)  Goal: Identify Related Risk Factors and Signs and Symptoms  Outcome: Ongoing (interventions implemented as appropriate)    Goal: Absence of Fall  Outcome: Ongoing (interventions implemented as appropriate)

## 2018-08-23 NOTE — NURSING NOTE
Report called to Torrie MCGILL at [facility]. Daughter here for transport. Esperanza Padilla RN

## 2018-08-23 NOTE — PROGRESS NOTES
Case Management Discharge Note    Final Note: Efaxed d/c summary to [facility]. Dtr to transfer pt after she gets off work today. CM will cont to follow.     Destination - Selection Complete     Service Request Status Selected Specialties Address Phone Number Fax Number    [facility] Selected Sk[facility] Nursing Facility [address] [place], [facility] [id number] [phone]      Durable Medical Equipment     No service has been selected for the patient.      Dialysis/Infusion     No service has been selected for the patient.      Home Medical Care     No service has been selected for the patient.      Social Care     No service has been selected for the patient.             Final Discharge Disposition Code: 03 - skilled nursing facility (SNF)

## 2018-08-24 NOTE — PROGRESS NOTES
Continued Stay Note  [facility]     Patient Name: Eliane Zamora  MRN: [id number]  Today's Date: 8/24/2018    Admit Date: 8/18/2018    Consent obtained for the participation in the [facility] Transitions Program. Lian Lin RN        Discharge Plan    No documentation.             Discharge Codes    No documentation.       Expected Discharge Date and Time     Expected Discharge Date Expected Discharge Time    Aug 23, 2018             Lian Lin RN